# Patient Record
Sex: FEMALE | Race: WHITE | NOT HISPANIC OR LATINO | Employment: FULL TIME | URBAN - METROPOLITAN AREA
[De-identification: names, ages, dates, MRNs, and addresses within clinical notes are randomized per-mention and may not be internally consistent; named-entity substitution may affect disease eponyms.]

---

## 2017-02-08 ENCOUNTER — OFFICE VISIT (OUTPATIENT)
Dept: FAMILY MEDICINE | Facility: CLINIC | Age: 42
End: 2017-02-08

## 2017-02-08 VITALS
WEIGHT: 172 LBS | HEART RATE: 86 BPM | DIASTOLIC BLOOD PRESSURE: 84 MMHG | SYSTOLIC BLOOD PRESSURE: 122 MMHG | BODY MASS INDEX: 27 KG/M2 | HEIGHT: 67 IN | OXYGEN SATURATION: 97 % | TEMPERATURE: 97.8 F | RESPIRATION RATE: 19 BRPM

## 2017-02-08 DIAGNOSIS — J06.9 VIRAL UPPER RESPIRATORY TRACT INFECTION: ICD-10-CM

## 2017-02-08 DIAGNOSIS — J20.9 ACUTE BRONCHITIS, UNSPECIFIED ORGANISM: Primary | ICD-10-CM

## 2017-02-08 PROBLEM — Z76.89 HEALTH CARE HOME: Status: ACTIVE | Noted: 2017-02-08

## 2017-02-08 PROCEDURE — 99213 OFFICE O/P EST LOW 20 MIN: CPT | Performed by: FAMILY MEDICINE

## 2017-02-08 RX ORDER — BENZONATATE 100 MG/1
100 CAPSULE ORAL 3 TIMES DAILY PRN
Qty: 21 CAPSULE | Refills: 0 | Status: SHIPPED | OUTPATIENT
Start: 2017-02-08 | End: 2018-09-24

## 2017-02-08 RX ORDER — GUAIFENESIN 600 MG/1
1200 TABLET, EXTENDED RELEASE ORAL 2 TIMES DAILY PRN
Qty: 40 TABLET | Refills: 0 | Status: SHIPPED | OUTPATIENT
Start: 2017-02-08 | End: 2018-09-24

## 2017-02-08 NOTE — PROGRESS NOTES
SUBJECTIVE: 41 year old female complaining of headache with nasal congestion and cough for 4 day(s).   The patient describes chills and a body ache with low grade fever, sinus pain and fatigue.   The patient denies a history of SOB, GI symptoms or rash.   Smoking history: No.   Relevant past medical history: positive for sinus infections in the past.    OBJECTIVE: The patient appears healthy, alert, no distress, cooperative, smiling and fatigued.   EARS: negative  NOSE/SINUS: positive findings: mucosa erythematous and swollen, clear rhinorrhea   THROAT: mild erythema, no tonsillar hypertrophy, no exudates present and post nasal drainage   NECK:Neck supple. No adenopathy. Thyroid symmetric, normal size,, Carotids without bruits.   CHEST: Clear with deep cough. No rales, rhonchi or wheezing  SKIN: Skin color, temperature, and turgor are normal. No rashes or suspicious skin lesions noted.    ASSESSMENT: (J20.9) Acute bronchitis, unspecified organism  (primary encounter diagnosis)  Comment: push fluids  Plan: guaiFENesin (MUCINEX) 600 MG 12 hr tablet,         benzonatate (TESSALON) 100 MG capsule        Symptomatic care with decongestants, fluids, tylenol/advil prn. Use GUAIFENESIN  MG OR TBCR, 1 tab po BID (Twice per day), D: 20, R: 0 for congestion and cough.    In addition, I have suggested that the patient   monitor for symptoms of bacterial infection expecting slow gradual resolution of viral URI as the natural course.      (J06.9,  B97.89) Viral upper respiratory tract infection  Plan: guaiFENesin (MUCINEX) 600 MG 12 hr tablet        As above.

## 2017-02-08 NOTE — PATIENT INSTRUCTIONS
Acute bronchitis, unspecified organism  (primary encounter diagnosis)  Comment: push fluids  Plan: guaiFENesin (MUCINEX) 600 MG 12 hr tablet,         benzonatate (TESSALON) 100 MG capsule        Symptomatic care with decongestants, fluids, tylenol/advil prn. Use cough drops / OTC congestion and cough medications.    In addition, I have suggested that the patient   monitor for symptoms of bacterial infection expecting slow gradual resolution of viral URI as the natural course.    Rest 1-2 more days at home

## 2017-02-08 NOTE — NURSING NOTE
Ree KAREN Medardo is here today for Sinus Symptoms: Nasal Congestion, Bloody Mucous Draining and in Cough, Sinus Pressure/Pain, Headache, body aches, fever, hot flashes, cold sweats, cough, ear pain, jaw pain, and fatigue x5 days    Pre-Visit Screening :  Immunizations : up to date (Tdap performed sometime during her pregnancy, do not have records at this time)  Colonoscopy : NA  Mammogram : is up to date  Asthma Action Test/Plan : NA  PHQ9/GAD7 :  NA  BP done on the right arm, with a large sized cuff.  Pulse - regular  My Chart - accepts    CLASSIFICATION OF OVERWEIGHT AND OBESITY BY BMI                         Obesity Class           BMI(kg/m2)  Underweight                                    < 18.5  Normal                                         18.5-24.9  Overweight                                     25.0-29.9  OBESITY                     I                  30.0-34.9                              II                 35.0-39.9  EXTREME OBESITY             III                >40                             Patient's  BMI Body mass index is 26.93 kg/(m^2).  http://hin.nhlbi.nih.gov/menuplanner/menu.cgi  Questioned patient about current smoking habits.  Pt. has never smoked.  Katie Hoff, CMA

## 2017-02-11 ENCOUNTER — OFFICE VISIT (OUTPATIENT)
Dept: FAMILY MEDICINE | Facility: CLINIC | Age: 42
End: 2017-02-11

## 2017-02-11 VITALS
SYSTOLIC BLOOD PRESSURE: 118 MMHG | HEART RATE: 72 BPM | DIASTOLIC BLOOD PRESSURE: 70 MMHG | OXYGEN SATURATION: 99 % | HEIGHT: 67 IN | BODY MASS INDEX: 26.68 KG/M2 | WEIGHT: 170 LBS | RESPIRATION RATE: 18 BRPM | TEMPERATURE: 97.5 F

## 2017-02-11 DIAGNOSIS — R05.9 COUGH: Primary | ICD-10-CM

## 2017-02-11 PROCEDURE — 99213 OFFICE O/P EST LOW 20 MIN: CPT | Performed by: FAMILY MEDICINE

## 2017-02-11 RX ORDER — AZITHROMYCIN 250 MG/1
TABLET, FILM COATED ORAL
Qty: 6 TABLET | Refills: 0 | Status: SHIPPED | OUTPATIENT
Start: 2017-02-11 | End: 2018-09-24

## 2017-02-11 NOTE — NURSING NOTE
Ree Batres is here today for a follow up on her Bronchitis. She is now having some Vertigo, Visual Disturbances, Diarrhea, and Red/Brown Mucous coming up.    Pre-Visit Screening :  Immunizations : up to date  Colonoscopy : NA  Mammogram : is up to date  Asthma Action Test/Plan : NA  PHQ9/GAD7 :  NA  BP done on the right arm, with a large sized cuff.  Pulse - regular  My Chart - accepts    CLASSIFICATION OF OVERWEIGHT AND OBESITY BY BMI                         Obesity Class           BMI(kg/m2)  Underweight                                    < 18.5  Normal                                         18.5-24.9  Overweight                                     25.0-29.9  OBESITY                     I                  30.0-34.9                              II                 35.0-39.9  EXTREME OBESITY             III                >40                             Patient's  BMI Body mass index is 26.62 kg/(m^2).  http://hin.nhlbi.nih.gov/menuplanner/menu.cgi  Questioned patient about current smoking habits.  Pt. has never smoked.  Katie Hoff, CMA

## 2017-02-11 NOTE — PROGRESS NOTES
"SUBJECTIVE:   Ree Batres is a 41 year old female who complains of nasal blockage, headache, facial pressure, productive cough, cough described as productive of green sputum, shortness of breath, myalgias, chills and fatigue for 7 days. Pt was not able to get out of bed Tue-Thurs due to aches. She denies a history of wheezing, vomiting and chest pain and denies a history of asthma. Patient does not smoke cigarettes.     OBJECTIVE:/70 mmHg  Pulse 72  Temp(Src) 97.5  F (36.4  C) (Oral)  Resp 18  Ht 1.702 m (5' 7\")  Wt 77.111 kg (170 lb)  BMI 26.62 kg/m2  SpO2 99%  LMP 02/08/2017 (Exact Date) She appears well, vital signs are as noted by the nurse. Ears normal.  Throat and pharynx normal.  Neck supple. No adenopathy in the neck. Nose is congested. Sinuses non tender. The chest is clear after cough, lots of rhonchi prior, without wheezes or rales.    ASSESSMENT:   Bronchitis-likely viral but getting more and more thick phlegm, also more chills-raises suspicious of bacterial infection developing-pt symptoms earlier this week suggest possible influenza so I favor covering for possible bacterial infection in setting of flu    PLAN:Zpack, I reviewed the risks, benefits, and possible side effects of the medication.  The patient had an opportunity to ask any questions regarding the treatment plan. The patient was encouraged to call my office if any problems.   Symptomatic therapy suggested: push fluids, rest and use acetaminophen, ibuprofen, cough suppressant of choice as needed. Call or return to clinic prn if these symptoms worsen or fail to improve as anticipated.   "

## 2017-07-11 ENCOUNTER — HOSPITAL ENCOUNTER (OUTPATIENT)
Dept: MAMMOGRAPHY | Facility: CLINIC | Age: 42
Discharge: HOME | End: 2017-07-11
Attending: OBSTETRICS & GYNECOLOGY | Admitting: OBSTETRICS & GYNECOLOGY
Payer: COMMERCIAL

## 2017-07-11 ENCOUNTER — HOSPITAL ENCOUNTER (OUTPATIENT)
Dept: ULTRASOUND IMAGING | Facility: CLINIC | Age: 42
End: 2017-07-11
Attending: OBSTETRICS & GYNECOLOGY
Payer: COMMERCIAL

## 2017-07-11 DIAGNOSIS — N63.0 BREAST MASS: ICD-10-CM

## 2017-07-11 PROCEDURE — 76642 ULTRASOUND BREAST LIMITED: CPT | Mod: LT

## 2017-07-11 PROCEDURE — G0279 TOMOSYNTHESIS, MAMMO: HCPCS

## 2017-07-13 ENCOUNTER — TELEPHONE (OUTPATIENT)
Dept: MAMMOGRAPHY | Facility: CLINIC | Age: 42
End: 2017-07-13

## 2017-07-13 ENCOUNTER — HOSPITAL ENCOUNTER (OUTPATIENT)
Dept: MRI IMAGING | Facility: CLINIC | Age: 42
Discharge: HOME | End: 2017-07-13
Attending: OBSTETRICS & GYNECOLOGY | Admitting: OBSTETRICS & GYNECOLOGY
Payer: COMMERCIAL

## 2017-07-13 DIAGNOSIS — N63.20 LEFT BREAST LUMP: ICD-10-CM

## 2017-07-13 PROCEDURE — 25000128 H RX IP 250 OP 636: Performed by: OBSTETRICS & GYNECOLOGY

## 2017-07-13 PROCEDURE — 77059 MR BREAST BILATERAL W/O & W CONTRAST: CPT

## 2017-07-13 PROCEDURE — A9585 GADOBUTROL INJECTION: HCPCS | Performed by: OBSTETRICS & GYNECOLOGY

## 2017-07-13 PROCEDURE — 27210995 ZZH RX 272: Performed by: OBSTETRICS & GYNECOLOGY

## 2017-07-13 RX ORDER — ACYCLOVIR 200 MG/1
30 CAPSULE ORAL ONCE
Status: COMPLETED | OUTPATIENT
Start: 2017-07-13 | End: 2017-07-13

## 2017-07-13 RX ORDER — GADOBUTROL 604.72 MG/ML
8 INJECTION INTRAVENOUS ONCE
Status: COMPLETED | OUTPATIENT
Start: 2017-07-13 | End: 2017-07-13

## 2017-07-13 RX ADMIN — SODIUM CHLORIDE 30 ML: 9 INJECTION, SOLUTION INTRAMUSCULAR; INTRAVENOUS; SUBCUTANEOUS at 10:48

## 2017-07-13 RX ADMIN — GADOBUTROL 8 ML: 604.72 INJECTION INTRAVENOUS at 10:47

## 2017-07-13 NOTE — TELEPHONE ENCOUNTER
"Ms. Batres was called and given her 7/13/2017 Breast MRI Results (Negative).   Breast Center Radiologist recommends \"Clinical Follow-up. Given the lack of imaging findings, further Management of breast lump should be based on clinical grounds.\"  "

## 2018-08-22 ENCOUNTER — HOSPITAL ENCOUNTER (OUTPATIENT)
Dept: MAMMOGRAPHY | Facility: CLINIC | Age: 43
Discharge: HOME | End: 2018-08-22
Attending: OBSTETRICS & GYNECOLOGY | Admitting: OBSTETRICS & GYNECOLOGY
Payer: COMMERCIAL

## 2018-08-22 DIAGNOSIS — Z12.31 VISIT FOR SCREENING MAMMOGRAM: ICD-10-CM

## 2018-08-22 PROCEDURE — 77063 BREAST TOMOSYNTHESIS BI: CPT

## 2018-09-24 ENCOUNTER — OFFICE VISIT (OUTPATIENT)
Dept: FAMILY MEDICINE | Facility: CLINIC | Age: 43
End: 2018-09-24

## 2018-09-24 VITALS
DIASTOLIC BLOOD PRESSURE: 70 MMHG | BODY MASS INDEX: 23.45 KG/M2 | HEIGHT: 67 IN | RESPIRATION RATE: 20 BRPM | WEIGHT: 149.4 LBS | HEART RATE: 80 BPM | TEMPERATURE: 99.4 F | SYSTOLIC BLOOD PRESSURE: 144 MMHG

## 2018-09-24 DIAGNOSIS — R52 BODY ACHES: ICD-10-CM

## 2018-09-24 DIAGNOSIS — R50.9 FEVER AND CHILLS: Primary | ICD-10-CM

## 2018-09-24 LAB
FLUAV AG UPPER RESP QL IA.RAPID: NORMAL
FLUBV AG UPPER RESP QL IA.RAPID: NORMAL

## 2018-09-24 PROCEDURE — 87804 INFLUENZA ASSAY W/OPTIC: CPT | Performed by: PHYSICIAN ASSISTANT

## 2018-09-24 PROCEDURE — 99213 OFFICE O/P EST LOW 20 MIN: CPT | Performed by: PHYSICIAN ASSISTANT

## 2018-09-24 NOTE — NURSING NOTE
Ree Batres is here for a fever, cough and not feeling well for the past day. Started yesterday after the [employer] game    Questioned patient about current smoking habits.  Pt. has never smoked.  PULSE regular  My Chart: active  CLASSIFICATION OF OVERWEIGHT AND OBESITY BY BMI                        Obesity Class           BMI(kg/m2)  Underweight                                    < 18.5  Normal                                         18.5-24.9  Overweight                                     25.0-29.9  OBESITY                     I                  30.0-34.9                             II                 35.0-39.9  EXTREME OBESITY             III                >40                            Patient's  BMI Body mass index is 23.4 kg/(m^2).  http://hin.nhlbi.nih.gov/menuplanner/menu.cgi  Pre-visit planning  Immunizations - up to date  Colonoscopy -   Mammogram -   Asthma -   PHQ9 -    BABAK-7 -

## 2018-09-24 NOTE — MR AVS SNAPSHOT
After Visit Summary   9/24/2018    Ree Batres    MRN: [id number]           Patient Information     Date Of Birth          1975        Visit Information        Provider Department      9/24/2018 5:30 PM Teena Meraz PA-C [facility]        Today's Diagnoses     Fever and chills    -  1    Body aches           Follow-ups after your visit        Follow-up notes from your care team     Return if symptoms worsen or fail to improve.      Future tests that were ordered for you today     Open Standing Orders        Priority Remaining Interval Expires Ordered    VENOUS COLLECTION Routine 1/1 9/24/2019 9/24/2018    CL AFF MONO SCREEN (BFP) Routine 1/1 9/24/2019 9/24/2018    HEMOGRAM/PLATELET (BFP) Routine 1/1 9/24/2019 9/24/2018            Who to contact     If you have questions or need follow up information about today's clinic visit or your schedule please contact [facility] directly at [phone].  Normal or non-critical lab and imaging results will be communicated to you by Qvolvehart, letter or phone within 4 business days after the clinic has received the results. If you do not hear from us within 7 days, please contact the clinic through Timecrost or phone. If you have a critical or abnormal lab result, we will notify you by phone as soon as possible.  Submit refill requests through [employer] or call your pharmacy and they will forward the refill request to us. Please allow 3 business days for your refill to be completed.          Additional Information About Your Visit        Qvolvehart Information     [employer] gives you secure access to your electronic health record. If you see a primary care provider, you can also send messages to your care team and make appointments. If you have questions, please call your primary care clinic.  If you do not have a primary care provider, please call [phone] and they will assist you.        Care  "EveryWhere ID     This is your Care EveryWhere ID. This could be used by other organizations to access your [place] medical records  [id number]        Your Vitals Were     Pulse Temperature Respirations Height Last Period Breastfeeding?    80 99.4  F (37.4  C) (Oral) 20 1.702 m (5' 7\") 09/24/2018 No    BMI (Body Mass Index)                   23.4 kg/m2            Blood Pressure from Last 3 Encounters:   09/24/18 144/70   02/11/17 118/70   02/08/17 122/84    Weight from Last 3 Encounters:   09/24/18 67.8 kg (149 lb 6.4 oz)   02/11/17 77.1 kg (170 lb)   02/08/17 78 kg (172 lb)              We Performed the Following     Influenza A and B (BFP)        Primary Care Provider Office Phone # Fax #    Katharina Lincoln [phone] [address] [address]        Equal Access to Services     : Hadii aad ku hadasho Soomaali, waaxda luqadaha, qaybta kaalmada adeegyada, waxay nattyin hayjuvencio ventura . So [facility] [phone].    ATENCIÓN: Si habla español, tiene a espinal disposición servicios gratuitos de asistencia lingüística. [facility] [phone].    We comply with applicable federal civil rights laws and [place] laws. We do not discriminate on the basis of race, color, national origin, age, disability, sex, sexual orientation, or gender identity.            Thank you!     Thank you for choosing [facility]  for your care. Our goal is always to provide you with excellent care. Hearing back from our patients is one way we can continue to improve our services. Please take a few minutes to complete the written survey that you may receive in the mail after your visit with us. Thank you!             Your Updated Medication List - Protect others around you: Learn how to safely use, store and throw away your medicines at www.disposemymeds.org.      Notice  As of 9/24/2018 11:12 PM    You have not been prescribed any medications.      "

## 2018-09-24 NOTE — PROGRESS NOTES
"CC: Fever, body aches    History:  Ree was in [employer] race in [place], [place] this past Friday and Saturday, 2 and 3 days ago. Was feeling run down Saturday night when she got home for the run. Went to [employer] game yesterday and came home with body aches, tingling sensation in skin, throat feeling sore, but not painful. Some jaw pain. and took temp and it was 102.4. Since then she has been waking up every 4 hours to take ibuprofen.     Did take ibuprofen 1-2 hours ago.    Son was treated for pneumonia earlier this month.     PMH, MEDICATIONS, ALLERGIES, SOCIAL AND FAMILY HISTORY in [facility] and reviewed by me personally.      ROS negative other than the symptoms noted above in the HPI.        Examination   /70 (BP Location: Left arm, Patient Position: Chair, Cuff Size: Adult Regular)  Pulse 80  Temp 99.4  F (37.4  C) (Oral)  Resp 20  Ht 1.702 m (5' 7\")  Wt 67.8 kg (149 lb 6.4 oz)  LMP 09/24/2018  Breastfeeding? No  BMI 23.4 kg/m2       Constitutional: Sitting comfortably, in no acute distress. Vital signs noted  Eyes: pupils equal round reactive to light and accomodation, extra ocular movements intact  Ears: external canals and TMs free of abnormalities  Nose: patent, without mucosal abnormalities  Mouth and throat: without erythema or lesions of the mucosa  Neck:  no adenopathy, trachea midline and normal to palpation  Cardiovascular:  regular rate and rhythm, no murmurs, clicks, or gallops  Respiratory:  normal respiratory rate and rhythm, lungs clear to auscultation  Psychiatric: mentation appears normal and affect normal/bright        A/P    ICD-10-CM    1. Fever and chills R50.9 Influenza A and B (BFP)     VENOUS COLLECTION     CL AFF MONO SCREEN (BFP)     HEMOGRAM/PLATELET (BFP)   2. Body aches R52 VENOUS COLLECTION     CL AFF MONO SCREEN (BFP)     HEMOGRAM/PLATELET (BFP)       DISCUSSION: Influenza swab today negative. Recommended CBC, and monospot (although warned that monospot test is more " accurate at 2 weeks). Patient would like to wait and check those later this week if not improving. I placed standing orders for this. Recommended fluids, calorie/electrolyte replacement. Alternate ibuprofen with food with Tylenol every 2-3 hours. Contact me tomorrow if not improving, or proceed to ER with worsening as may need fluid replacement.     follow up visit: As needed    Teena Meraz PA-C  [facility]

## 2018-09-26 ENCOUNTER — APPOINTMENT (OUTPATIENT)
Dept: GENERAL RADIOLOGY | Facility: CLINIC | Age: 43
End: 2018-09-26
Attending: EMERGENCY MEDICINE
Payer: COMMERCIAL

## 2018-09-26 ENCOUNTER — HOSPITAL ENCOUNTER (EMERGENCY)
Facility: CLINIC | Age: 43
Discharge: HOME | End: 2018-09-26
Attending: EMERGENCY MEDICINE | Admitting: EMERGENCY MEDICINE
Payer: COMMERCIAL

## 2018-09-26 VITALS
TEMPERATURE: 101 F | SYSTOLIC BLOOD PRESSURE: 151 MMHG | RESPIRATION RATE: 16 BRPM | DIASTOLIC BLOOD PRESSURE: 90 MMHG | OXYGEN SATURATION: 97 %

## 2018-09-26 DIAGNOSIS — J18.9 PNEUMONIA OF LEFT UPPER LOBE DUE TO INFECTIOUS ORGANISM: ICD-10-CM

## 2018-09-26 LAB
ALBUMIN SERPL-MCNC: 3.6 G/DL (ref 3.4–5)
ALBUMIN UR-MCNC: NEGATIVE MG/DL
ALP SERPL-CCNC: 66 U/L (ref 40–150)
ALT SERPL W P-5'-P-CCNC: 24 U/L (ref 0–50)
ANION GAP SERPL CALCULATED.3IONS-SCNC: 6 MMOL/L (ref 3–14)
APPEARANCE UR: CLEAR
AST SERPL W P-5'-P-CCNC: 23 U/L (ref 0–45)
BASOPHILS # BLD AUTO: 0 10E9/L (ref 0–0.2)
BASOPHILS NFR BLD AUTO: 0.3 %
BILIRUB SERPL-MCNC: 0.4 MG/DL (ref 0.2–1.3)
BILIRUB UR QL STRIP: NEGATIVE
BUN SERPL-MCNC: 9 MG/DL (ref 7–30)
CALCIUM SERPL-MCNC: 8.5 MG/DL (ref 8.5–10.1)
CHLORIDE SERPL-SCNC: 105 MMOL/L (ref 94–109)
CO2 SERPL-SCNC: 28 MMOL/L (ref 20–32)
COLOR UR AUTO: COLORLESS
CREAT SERPL-MCNC: 0.56 MG/DL (ref 0.52–1.04)
DEPRECATED S PYO AG THROAT QL EIA: ABNORMAL
DIFFERENTIAL METHOD BLD: NORMAL
EOSINOPHIL # BLD AUTO: 0 10E9/L (ref 0–0.7)
EOSINOPHIL NFR BLD AUTO: 0.5 %
ERYTHROCYTE [DISTWIDTH] IN BLOOD BY AUTOMATED COUNT: 13.1 % (ref 10–15)
GFR SERPL CREATININE-BSD FRML MDRD: >90 ML/MIN/1.7M2
GLUCOSE SERPL-MCNC: 96 MG/DL (ref 70–99)
GLUCOSE UR STRIP-MCNC: NEGATIVE MG/DL
HCG UR QL: NEGATIVE
HCT VFR BLD AUTO: 38.8 % (ref 35–47)
HETEROPH AB SER QL: NEGATIVE
HGB BLD-MCNC: 12.8 G/DL (ref 11.7–15.7)
HGB UR QL STRIP: NEGATIVE
IMM GRANULOCYTES # BLD: 0 10E9/L (ref 0–0.4)
IMM GRANULOCYTES NFR BLD: 0.3 %
KETONES UR STRIP-MCNC: NEGATIVE MG/DL
LEUKOCYTE ESTERASE UR QL STRIP: NEGATIVE
LYMPHOCYTES # BLD AUTO: 1.1 10E9/L (ref 0.8–5.3)
LYMPHOCYTES NFR BLD AUTO: 16.6 %
MCH RBC QN AUTO: 30.4 PG (ref 26.5–33)
MCHC RBC AUTO-ENTMCNC: 33 G/DL (ref 31.5–36.5)
MCV RBC AUTO: 92 FL (ref 78–100)
MONOCYTES # BLD AUTO: 1 10E9/L (ref 0–1.3)
MONOCYTES NFR BLD AUTO: 14.6 %
NEUTROPHILS # BLD AUTO: 4.4 10E9/L (ref 1.6–8.3)
NEUTROPHILS NFR BLD AUTO: 67.7 %
NITRATE UR QL: NEGATIVE
NRBC # BLD AUTO: 0 10*3/UL
NRBC BLD AUTO-RTO: 0 /100
PH UR STRIP: 7 PH (ref 5–7)
PLATELET # BLD AUTO: 200 10E9/L (ref 150–450)
POTASSIUM SERPL-SCNC: 4 MMOL/L (ref 3.4–5.3)
PROT SERPL-MCNC: 7.4 G/DL (ref 6.8–8.8)
RBC # BLD AUTO: 4.21 10E12/L (ref 3.8–5.2)
RBC #/AREA URNS AUTO: <1 /HPF (ref 0–2)
SODIUM SERPL-SCNC: 139 MMOL/L (ref 133–144)
SOURCE: NORMAL
SP GR UR STRIP: 1 (ref 1–1.03)
SPECIMEN SOURCE: ABNORMAL
SQUAMOUS #/AREA URNS AUTO: <1 /HPF (ref 0–1)
UROBILINOGEN UR STRIP-MCNC: 0 MG/DL (ref 0–2)
WBC # BLD AUTO: 6.5 10E9/L (ref 4–11)
WBC #/AREA URNS AUTO: <1 /HPF (ref 0–5)

## 2018-09-26 PROCEDURE — 25000132 ZZH RX MED GY IP 250 OP 250 PS 637

## 2018-09-26 PROCEDURE — 87880 STREP A ASSAY W/OPTIC: CPT | Performed by: EMERGENCY MEDICINE

## 2018-09-26 PROCEDURE — 96365 THER/PROPH/DIAG IV INF INIT: CPT

## 2018-09-26 PROCEDURE — 81025 URINE PREGNANCY TEST: CPT | Performed by: EMERGENCY MEDICINE

## 2018-09-26 PROCEDURE — 99284 EMERGENCY DEPT VISIT MOD MDM: CPT | Mod: 25

## 2018-09-26 PROCEDURE — 86308 HETEROPHILE ANTIBODY SCREEN: CPT | Performed by: EMERGENCY MEDICINE

## 2018-09-26 PROCEDURE — 36415 COLL VENOUS BLD VENIPUNCTURE: CPT | Performed by: EMERGENCY MEDICINE

## 2018-09-26 PROCEDURE — 25000128 H RX IP 250 OP 636: Performed by: EMERGENCY MEDICINE

## 2018-09-26 PROCEDURE — 85025 COMPLETE CBC W/AUTO DIFF WBC: CPT | Performed by: EMERGENCY MEDICINE

## 2018-09-26 PROCEDURE — 71046 X-RAY EXAM CHEST 2 VIEWS: CPT

## 2018-09-26 PROCEDURE — 80053 COMPREHEN METABOLIC PANEL: CPT | Performed by: EMERGENCY MEDICINE

## 2018-09-26 PROCEDURE — 96367 TX/PROPH/DG ADDL SEQ IV INF: CPT

## 2018-09-26 PROCEDURE — 96361 HYDRATE IV INFUSION ADD-ON: CPT

## 2018-09-26 PROCEDURE — 87040 BLOOD CULTURE FOR BACTERIA: CPT | Mod: 91 | Performed by: EMERGENCY MEDICINE

## 2018-09-26 PROCEDURE — 81001 URINALYSIS AUTO W/SCOPE: CPT | Performed by: EMERGENCY MEDICINE

## 2018-09-26 PROCEDURE — 96375 TX/PRO/DX INJ NEW DRUG ADDON: CPT

## 2018-09-26 RX ORDER — SODIUM CHLORIDE 9 MG/ML
1000 INJECTION, SOLUTION INTRAVENOUS CONTINUOUS
Status: DISCONTINUED | OUTPATIENT
Start: 2018-09-26 | End: 2018-09-26 | Stop reason: HOSPADM

## 2018-09-26 RX ORDER — CEFDINIR 300 MG/1
300 CAPSULE ORAL 2 TIMES DAILY
Qty: 20 CAPSULE | Refills: 0 | Status: SHIPPED | OUTPATIENT
Start: 2018-09-26 | End: 2019-12-13

## 2018-09-26 RX ORDER — AZITHROMYCIN 250 MG/1
TABLET, FILM COATED ORAL
Qty: 4 TABLET | Refills: 0 | Status: SHIPPED | OUTPATIENT
Start: 2018-09-26 | End: 2018-09-30

## 2018-09-26 RX ORDER — ACETAMINOPHEN 325 MG/1
TABLET ORAL
Status: COMPLETED
Start: 2018-09-26 | End: 2018-09-26

## 2018-09-26 RX ORDER — METOCLOPRAMIDE HYDROCHLORIDE 5 MG/ML
10 INJECTION INTRAMUSCULAR; INTRAVENOUS ONCE
Status: COMPLETED | OUTPATIENT
Start: 2018-09-26 | End: 2018-09-26

## 2018-09-26 RX ORDER — BENZONATATE 100 MG/1
100 CAPSULE ORAL 3 TIMES DAILY PRN
Qty: 15 CAPSULE | Refills: 0 | Status: SHIPPED | OUTPATIENT
Start: 2018-09-26 | End: 2019-12-13

## 2018-09-26 RX ORDER — DIPHENHYDRAMINE HYDROCHLORIDE 50 MG/ML
12.5 INJECTION INTRAMUSCULAR; INTRAVENOUS ONCE
Status: COMPLETED | OUTPATIENT
Start: 2018-09-26 | End: 2018-09-26

## 2018-09-26 RX ORDER — CEFTRIAXONE 1 G/1
1 INJECTION, POWDER, FOR SOLUTION INTRAMUSCULAR; INTRAVENOUS ONCE
Status: COMPLETED | OUTPATIENT
Start: 2018-09-26 | End: 2018-09-26

## 2018-09-26 RX ADMIN — ACETAMINOPHEN: 325 TABLET ORAL at 14:47

## 2018-09-26 RX ADMIN — METOCLOPRAMIDE 10 MG: 5 INJECTION, SOLUTION INTRAMUSCULAR; INTRAVENOUS at 10:59

## 2018-09-26 RX ADMIN — SODIUM CHLORIDE 1000 ML: 9 INJECTION, SOLUTION INTRAVENOUS at 10:59

## 2018-09-26 RX ADMIN — CEFTRIAXONE SODIUM 1 G: 1 INJECTION, POWDER, FOR SOLUTION INTRAMUSCULAR; INTRAVENOUS at 12:52

## 2018-09-26 RX ADMIN — AZITHROMYCIN MONOHYDRATE 500 MG: 500 INJECTION, POWDER, LYOPHILIZED, FOR SOLUTION INTRAVENOUS at 13:17

## 2018-09-26 RX ADMIN — DIPHENHYDRAMINE HYDROCHLORIDE 12.5 MG: 50 INJECTION, SOLUTION INTRAMUSCULAR; INTRAVENOUS at 11:00

## 2018-09-26 ASSESSMENT — ENCOUNTER SYMPTOMS
APPETITE CHANGE: 1
ABDOMINAL PAIN: 0
FEVER: 1
COUGH: 1
HEADACHES: 1
DIARRHEA: 0

## 2018-09-26 NOTE — ED PROVIDER NOTES
History     Chief Complaint:    Cough and Fever    HPI   Ree Batres is a 43 year old female who presents with a 5 day history of cough and a fever. The patient reports that she was involved in a [employer] race in the cold and rain of [place] and then returned home to watch the [employer] game this past Sunday. She reports that she felt feverish and she felt a crawling sensation on her skin, which is worsened by the fever. She reports that she went home and measured a temperature of 103. The next morning, Monday, she got up and took her temperature at 104.8. She reports that she could not go to work due to the discomfort and has been mostly laying in bed since Monday. She has been taking ibuprofen every 4 hours which has maintained her fever between 100-102. She reports that she has also had a worsened appetite, some vision changes, cough, a scratchy throat, voice change, an intense headache, and is very sweaty. The patient notes that her sister is a doctor and she had mentioned to her sister that they were in the woods recently and her sister advised that she relay this information to us. The patient denies any new rashes.     Of note: The patient went to her doctor's office where she had a negative swab but no chest X ray was done. She denies any other medical concerns.Last ibuprofen about 0800, last tylenol about 0600 this morning. She denies diarrhea, abdominal pain,    Allergies:  Amoxicillin  Codeine sulfate  Penicillins      Medications:    No current outpatient prescriptions on file.     Past Medical History:    Basal cell carcinoma of skin   H pylori ulcer   Renal disease     Past Surgical History:    Appendectomy  Cholecystectomy  Colposcopy, conization, combined  Knee surgery    Family History:    Cancer  Alzheimer's disease  Thyroid disease    Social History:  The patient  reports that she has never smoked. She has never used smokeless tobacco. She reports that she drinks about 0.5 oz of alcohol per  week  She reports that she does not use illicit drugs.   Marital Status:   [2]     Review of Systems   Constitutional: Positive for appetite change and fever.   Eyes: Positive for visual disturbance.   Respiratory: Positive for cough.    Gastrointestinal: Negative for abdominal pain and diarrhea.   Neurological: Positive for headaches.   All other systems reviewed and are negative.    Physical Exam   First Vitals:  BP: (!) 160/115  Heart Rate: 81  Temp: 98.9  F (37.2  C)  Resp: 16  SpO2: 97 %      Physical Exam  Constitutional:  Appears well-developed and well-nourished. Alert. Conversant.  Feels hot and skin is sweaty.  Mildly hoarse voice but no respiratory distress.  HENT:   Head: Atraumatic.   Right ear: Pinna, canal are normal.  Nonpurulent fluid behind the right tympanic membrane but no bulging or erythema.  Left ear: Pinna, canal, tympanic membrane are normal.  Nose: Nose normal.  Mouth/Throat: Oral mucosa is clear and moist. no trismus.  Erythema of both peritonsillar pillars but tonsils and uvula look normal. Tonsils symmetric. No tonsillar enlargement, erythema, or exudate.  Eyes: Conjunctivae normal. EOM normal. Pupils equal, round, and reactive to light. No scleral icterus.   Neck: Normal range of motion. Neck supple. No tracheal deviation present.   Cardiovascular: Normal rate, regular rhythm. No gallop. No friction rub. No murmur heard. Symmetric radial artery pulses   Pulmonary/Chest: Effort normal. No stridor. No respiratory distress. No wheezes. No rales. No rhonchi .  Diminished breath sounds in the left base but no definite rales or rhonchi.  No tenderness.   Abdominal: Soft. Bowel sounds normal. No distension. No mass or hepatosplenomegaly. No tenderness. No rebound. No guarding.   Musculoskeletal:   RUE: Normal range of motion. No tenderness. No deformity  LUE: Normal range of motion. No tenderness. No deformity  RLE: Normal range of motion. No edema. No tenderness. No deformity  LLE:  Normal range of motion. No edema. No tenderness. No deformity  Lymph: No cervical adenopathy.   Neurological: Alert and oriented to person, place, and time. Normal strength. CN II-VII intact. No sensory deficit. GCS eye subscore is 4. GCS verbal subscore is 5. GCS motor subscore is 6. Normal coordination   Skin: Skin is warm to the touch and sweaty.  No rash noted. No pallor. Normal capillary refill.  Psychiatric:  Normal mood. Normal affect.     Emergency Department Course   Imaging:  XR Chest 2 Views   Final Result   IMPRESSION: Patchy opacity in the left upper lobe corresponding to   pneumonia. Recommend continued radiographic surveillance until   resolution. No pleural effusion or pneumothorax.      JADIEL HARP MD         I communicated the results of the imaging studies with the patient who expressed understanding of these findings.      Laboratory:  UA: WNL  HCG qualitative Negative  Blood Culture: pending x 2   CBC: WNL   Mono: Negative  Rapid strep Positive    Interventions:  1059: NS 1L IV Bolus   1059: Reglan 10mg IV  1100: Benadryl 12.5mg IV  1252: Rocephin 1g IV  1431: Zithromax 500mg   1447: Tylenol 325g     Emergency Department Course:  Past medical records, nursing notes, and vitals reviewed.  1029: I performed an exam of the patient and obtained history, as documented above.       IV inserted and blood drawn for the above work up to be conducted and for the above interventions to be administered.     The patient was sent for a CXR while in the emergency department, findings above.     1427: Rechecked the patient, findings and plan explained to the patient. Patient discharged home, status improved, with instructions regarding supportive care, medications, and reasons to return as well as the importance of close follow-up was reviewed.    Impression & Plan    Medical Decision Making:  Ree Batres is a 43 year old female presents for evaluation of fever, ongoing for the past few days.  It is  associated with cough, sweating, fatigue, body aches, headache.  It all started after she did the [employer] race in [place].  Differential was broad.  No classic rash to suggest viral syndrome. No evidence for OM on exam.  She did have mild pharyngeal erythema but no tonsillar exudates.  We did do a strep screen which is positive.  Unclear if this is truly causing her symptoms or if it is a false positive or perhaps detection of a carrier state.  Chest x-ray confirms a left upper lobe pneumonia. with her symptoms I suspect this is the true cause of her fever.  Laboratory workup is reassuring.  Oxygen is normal.  Vital signs are stable.  No cardiopulmonary comorbidities so I think she is safe for outpatient management.     Differential for fever included cellulitis, septic arthritis, osteomyelitis but these are not seen on exam. Abdominal exam is benign, appendicitis/colitis/ intra-abdominal source for fever is unlikely. The patient is smiling, alert, sitting up, and non-toxic, so I do not think sepsis or meningitis is present. UA is normal.    At this point the child is non-toxic, well appearing. This fever is likely due to viral illness. Plan of care includes supportive care with antipyretics, fluids, and watchful waiting at home. Instructions to return for recheck in 5-7 days if not improved, or immediately if worsening fever, decreasing oral intake, lethargy, irritability, seizure, or any other concerns.    Critical Care time:  none    Diagnosis:    ICD-10-CM    1. Pneumonia of left upper lobe due to infectious organism (H) J18.1        Disposition:  discharged to home    Discharge Medications:  Discharge Medication List as of 9/26/2018  2:32 PM      START taking these medications    Details   azithromycin (ZITHROMAX Z-KARISHMA) 250 MG tablet one tablet daily for the next 4 days (first dose given in ER), Disp-4 tablet, R-0, Local Print      benzonatate (TESSALON) 100 MG capsule Take 1 capsule (100 mg) by mouth 3 times  daily as needed for cough, Disp-15 capsule, R-0, Local Print      cefdinir (OMNICEF) 300 MG capsule Take 1 capsule (300 mg) by mouth 2 times daily, Disp-20 capsule, R-0, Local Print           Alejandra YANEZ am serving as a scribe at 10:29 AM on 9/26/2018 to document services personally performed by David Marrero MD* based on my observations and the provider's statements to me.    [facility] EMERGENCY DEPARTMENT       David Marrero MD  09/26/18 [id number]

## 2018-09-26 NOTE — DISCHARGE INSTRUCTIONS
Pneumonia (Adult)    Your chest x-ray shows that you have a pneumonia in your left upper lung.  Please take your antibiotics to help treat the infection.  Return to the ER right away if you have any worsening trouble breathing, weakness or lightheadedness or fainting spells, worsening chest pain, or if you have any concerns.  Recheck with your doctor within 1-2 weeks to get a follow-up x-ray to make sure that all of the fluid from your pneumonia is gone from your lung.  Pneumonia is an infection deep within the lungs. It is in the small air sacs (alveoli). Pneumonia may be caused by a virus or bacteria. Pneumonia caused by bacteria is usually treated with an antibiotic. Severe cases may need to be treated in the hospital. Milder cases can be treated at home. Symptoms usually start to get better during the first 2 days of treatment.    Home care  Follow these guidelines when caring for yourself at home:    Rest at home for the first 2 to 3 days, or until you feel stronger. Don t let yourself get overly tired when you go back to your activities.    Stay away from cigarette smoke - yours or other people s.    You may use acetaminophen or ibuprofen to control fever or pain, unless another medicine was prescribed. If you have chronic liver or kidney disease, talk with your healthcare provider before using these medicines. Also talk with your provider if you ve had a stomach ulcer or gastrointestinal bleeding. Don t give aspirin to anyone younger than 18 years of age who is ill with a fever. It may cause severe liver damage.    Your appetite may be poor, so a light diet is fine.    Drink 6 to 8 glasses of fluids every day to make sure you are getting enough fluids. Beverages can include water, sport drinks, sodas without caffeine, juices, tea, or soup. Fluids will help loosen secretions in the lung. This will make it easier for you to cough up the phlegm (sputum). If you also have heart or kidney disease, check with  your healthcare provider before you drink extra fluids.    Take antibiotic medicine prescribed until it is all gone, even if you are feeling better after a few days.  Follow-up care  Follow up with your healthcare provider in the next 2 to 3 days, or as advised. This is to be sure the medicine is helping you get better.  If you are 65 or older, you should get a pneumococcal vaccine and a yearly flu (influenza) shot. You should also get these vaccines if you have chronic lung disease like asthma, emphysema, or COPD. Recently, a second type of pneumonia vaccine has become available for everyone over 65 years old. This is in addition to the previous vaccine. Ask your provider about this.  When to seek medical advice  Call your healthcare provider right away if any of these occur:    You don t get better within the first 48 hours of treatment    Shortness of breath gets worse    Rapid breathing (more than 25 breaths per minute)    Coughing up blood    Chest pain gets worse with breathing    Fever of 100.4 F (38 C) or higher that doesn t get better with fever medicine    Weakness, dizziness, or fainting that gets worse    Thirst or dry mouth that gets worse    Sinus pain, headache, or a stiff neck    Chest pain not caused by coughing  Date Last Reviewed: 1/1/2017 2000-2017 [employer]. [address] [place] [address], [place] [address]. All rights reserved. This information is not intended as a substitute for professional medical care. Always follow your healthcare professional's instructions.

## 2018-09-26 NOTE — ED TRIAGE NOTES
Pt arrives with cough and fever since Friday. Was seen at primary on Monday, had negative flu swab, no chest xray. Cough and fever have persisted, fever up to 104 at home temporally. ABCs intact.     Last tylenol at 10 am.

## 2018-09-26 NOTE — ED AVS SNAPSHOT
[facility]    [address]    [facility] [address]    Phone:  [phone]    Fax:  [phone]                                       Ree Batres   MRN: [id number]    Department:  [facility]   Date of Visit:  9/26/2018           Patient Information     Date Of Birth          1975        Your diagnoses for this visit were:     Pneumonia of left upper lobe due to infectious organism (H)        You were seen by David Marrero MD.      Follow-up Information     Follow up with Katharina Linocln MD In 7 days.    Specialty:  Family Practice    Contact information:    [address]  [facility] [address] [phone]          Discharge Instructions         Pneumonia (Adult)    Your chest x-ray shows that you have a pneumonia in your left upper lung.  Please take your antibiotics to help treat the infection.  Return to the ER right away if you have any worsening trouble breathing, weakness or lightheadedness or fainting spells, worsening chest pain, or if you have any concerns.  Recheck with your doctor within 1-2 weeks to get a follow-up x-ray to make sure that all of the fluid from your pneumonia is gone from your lung.  Pneumonia is an infection deep within the lungs. It is in the small air sacs (alveoli). Pneumonia may be caused by a virus or bacteria. Pneumonia caused by bacteria is usually treated with an antibiotic. Severe cases may need to be treated in the hospital. Milder cases can be treated at home. Symptoms usually start to get better during the first 2 days of treatment.    Home care  Follow these guidelines when caring for yourself at home:    Rest at home for the first 2 to 3 days, or until you feel stronger. Don t let yourself get overly tired when you go back to your activities.    Stay away from cigarette smoke - yours or other people s.    You may use acetaminophen or ibuprofen to control  fever or pain, unless another medicine was prescribed. If you have chronic liver or kidney disease, talk with your healthcare provider before using these medicines. Also talk with your provider if you ve had a stomach ulcer or gastrointestinal bleeding. Don t give aspirin to anyone younger than 18 years of age who is ill with a fever. It may cause severe liver damage.    Your appetite may be poor, so a light diet is fine.    Drink 6 to 8 glasses of fluids every day to make sure you are getting enough fluids. Beverages can include water, sport drinks, sodas without caffeine, juices, tea, or soup. Fluids will help loosen secretions in the lung. This will make it easier for you to cough up the phlegm (sputum). If you also have heart or kidney disease, check with your healthcare provider before you drink extra fluids.    Take antibiotic medicine prescribed until it is all gone, even if you are feeling better after a few days.  Follow-up care  Follow up with your healthcare provider in the next 2 to 3 days, or as advised. This is to be sure the medicine is helping you get better.  If you are 65 or older, you should get a pneumococcal vaccine and a yearly flu (influenza) shot. You should also get these vaccines if you have chronic lung disease like asthma, emphysema, or COPD. Recently, a second type of pneumonia vaccine has become available for everyone over 65 years old. This is in addition to the previous vaccine. Ask your provider about this.  When to seek medical advice  Call your healthcare provider right away if any of these occur:    You don t get better within the first 48 hours of treatment    Shortness of breath gets worse    Rapid breathing (more than 25 breaths per minute)    Coughing up blood    Chest pain gets worse with breathing    Fever of 100.4 F (38 C) or higher that doesn t get better with fever medicine    Weakness, dizziness, or fainting that gets worse    Thirst or dry mouth that gets worse    Sinus  pain, headache, or a stiff neck    Chest pain not caused by coughing  Date Last Reviewed: 1/1/2017 2000-2017 [employer]. [address] [place] [address], [place] [address]. All rights reserved. This information is not intended as a substitute for professional medical care. Always follow your healthcare professional's instructions.          24 Hour Appointment Hotline       To make an appointment at any [facility], call [phone] ([phone]). If you don't have a family doctor or clinic, we will help you find one. [facility] are conveniently located to serve the needs of you and your family.             Review of your medicines      START taking        Dose / Directions Last dose taken    azithromycin 250 MG tablet   Commonly known as:  ZITHROMAX Z-KARISHMA   Quantity:  4 tablet        one tablet daily for the next 4 days (first dose given in ER)   Refills:  0        benzonatate 100 MG capsule   Commonly known as:  TESSALON   Dose:  100 mg   Quantity:  15 capsule        Take 1 capsule (100 mg) by mouth 3 times daily as needed for cough   Refills:  0        cefdinir 300 MG capsule   Commonly known as:  OMNICEF   Dose:  300 mg   Quantity:  20 capsule        Take 1 capsule (300 mg) by mouth 2 times daily   Refills:  0                Prescriptions were sent or printed at these locations (3 Prescriptions)                   Other Prescriptions                Printed at Department/Unit printer (3 of 3)         azithromycin (ZITHROMAX Z-KARISHMA) 250 MG tablet               benzonatate (TESSALON) 100 MG capsule               cefdinir (OMNICEF) 300 MG capsule                Procedures and tests performed during your visit     Procedure/Test Number of Times Performed    Blood culture 2    CBC with platelets differential 1    Comprehensive metabolic panel 1    HCG qualitative urine (UPT) 1    Mononucleosis screen 1    Rapid strep screen 1    UA with Microscopic 1    XR Chest 2 Views 1      Orders Needing  Specimen Collection     None      Pending Results     Date and Time Order Name Status Description    9/26/2018 1108 Blood culture Preliminary     9/26/2018 1049 Blood culture Preliminary             Pending Culture Results     Date and Time Order Name Status Description    9/26/2018 1108 Blood culture Preliminary     9/26/2018 1049 Blood culture Preliminary             Pending Results Instructions     If you had any lab results that were not finalized at the time of your Discharge, you can call the ED Lab Result RN at [phone]. You will be contacted by this team for any positive Lab results or changes in treatment. The nurses are available 7 days a week from 10A to 6:30P.  You can leave a message 24 hours per day and they will return your call.        Test Results From Your Hospital Stay        9/26/2018 11:25 AM      Component Results     Component Value Ref Range & Units Status    WBC 6.5 4.0 - 11.0 10e9/L Final    RBC Count 4.21 3.8 - 5.2 10e12/L Final    Hemoglobin 12.8 11.7 - 15.7 g/dL Final    Hematocrit 38.8 35.0 - 47.0 % Final    MCV 92 78 - 100 fl Final    MCH 30.4 26.5 - 33.0 pg Final    MCHC 33.0 31.5 - 36.5 g/dL Final    RDW 13.1 10.0 - 15.0 % Final    Platelet Count 200 150 - 450 10e9/L Final    Diff Method Automated Method  Final    % Neutrophils 67.7 % Final    % Lymphocytes 16.6 % Final    % Monocytes 14.6 % Final    % Eosinophils 0.5 % Final    % Basophils 0.3 % Final    % Immature Granulocytes 0.3 % Final    Nucleated RBCs 0 0 /100 Final    Absolute Neutrophil 4.4 1.6 - 8.3 10e9/L Final    Absolute Lymphocytes 1.1 0.8 - 5.3 10e9/L Final    Absolute Monocytes 1.0 0.0 - 1.3 10e9/L Final    Absolute Eosinophils 0.0 0.0 - 0.7 10e9/L Final    Absolute Basophils 0.0 0.0 - 0.2 10e9/L Final    Abs Immature Granulocytes 0.0 0 - 0.4 10e9/L Final    Absolute Nucleated RBC 0.0  Final         9/26/2018 11:43 AM      Component Results     Component Value Ref Range & Units Status    Sodium 139 133 - 144  mmol/L Final    Potassium 4.0 3.4 - 5.3 mmol/L Final    Chloride 105 94 - 109 mmol/L Final    Carbon Dioxide 28 20 - 32 mmol/L Final    Anion Gap 6 3 - 14 mmol/L Final    Glucose 96 70 - 99 mg/dL Final    Urea Nitrogen 9 7 - 30 mg/dL Final    Creatinine 0.56 0.52 - 1.04 mg/dL Final    GFR Estimate >90 >60 mL/min/1.7m2 Final    Non  GFR Calc    GFR Estimate If Black >90 >60 mL/min/1.7m2 Final    African American GFR Calc    Calcium 8.5 8.5 - 10.1 mg/dL Final    Bilirubin Total 0.4 0.2 - 1.3 mg/dL Final    Albumin 3.6 3.4 - 5.0 g/dL Final    Protein Total 7.4 6.8 - 8.8 g/dL Final    Alkaline Phosphatase 66 40 - 150 U/L Final    ALT 24 0 - 50 U/L Final    AST 23 0 - 45 U/L Final         9/26/2018  1:37 PM      Component Results     Component Value Ref Range & Units Status    Mononucleosis Screen Negative NEG^Negative Final         9/26/2018 11:38 AM      Component Results     Component    Specimen Description    Throat    Rapid Strep A Screen (Abnormal)    POSITIVE: Group A Streptococcal antigen detected by immunoassay.         9/26/2018  1:08 PM      Component Results     Component    Specimen Description    Blood Left Arm    Special Requests    Aerobic and anaerobic bottles received    Culture Micro    PENDING         9/26/2018 12:49 PM      Narrative     CHEST TWO VIEWS  9/26/2018 11:46 AM     HISTORY: 43-year-old patient with cough and fever.    COMPARISON: None         Impression     IMPRESSION: Patchy opacity in the left upper lobe corresponding to  pneumonia. Recommend continued radiographic surveillance until  resolution. No pleural effusion or pneumothorax.    JADIEL HARP MD         9/26/2018 11:52 AM      Component Results     Component Value Ref Range & Units Status    Color Urine Colorless  Final    Appearance Urine Clear  Final    Glucose Urine Negative NEG^Negative mg/dL Final    Bilirubin Urine Negative NEG^Negative Final    Ketones Urine Negative NEG^Negative mg/dL Final    Specific  Gravity Urine 1.003 1.003 - 1.035 Final    Blood Urine Negative NEG^Negative Final    pH Urine 7.0 5.0 - 7.0 pH Final    Protein Albumin Urine Negative NEG^Negative mg/dL Final    Urobilinogen mg/dL 0.0 0.0 - 2.0 mg/dL Final    Nitrite Urine Negative NEG^Negative Final    Leukocyte Esterase Urine Negative NEG^Negative Final    Source Midstream Urine  Final    WBC Urine <1 0 - 5 /HPF Final    RBC Urine <1 0 - 2 /HPF Final    Squamous Epithelial /HPF Urine <1 0 - 1 /HPF Final         9/26/2018 11:51 AM      Component Results     Component Value Ref Range & Units Status    HCG Qual Urine Negative NEG^Negative Final    This test is for screening purposes.  Results should be interpreted along with   the clinical picture.  Confirmation testing is available if warranted by   ordering [id number], HCG Quantitative Pregnancy.           9/26/2018  1:08 PM      Component Results     Component    Specimen Description    Blood Right Arm    Special Requests    Aerobic and anaerobic bottles received    Culture Micro    PENDING                Clinical Quality Measure: Blood Pressure Screening     Your blood pressure was checked while you were in the emergency department today. The last reading we obtained was  BP: (!) 149/93 . Please read the guidelines below about what these numbers mean and what you should do about them.  If your systolic blood pressure (the top number) is less than 120 and your diastolic blood pressure (the bottom number) is less than 80, then your blood pressure is normal. There is nothing more that you need to do about it.  If your systolic blood pressure (the top number) is 120-139 or your diastolic blood pressure (the bottom number) is 80-89, your blood pressure may be higher than it should be. You should have your blood pressure rechecked within a year by a primary care provider.  If your systolic blood pressure (the top number) is 140 or greater or your diastolic blood pressure (the bottom number) is 90 or  greater, you may have high blood pressure. High blood pressure is treatable, but if left untreated over time it can put you at risk for heart attack, stroke, or kidney failure. You should have your blood pressure rechecked by a primary care provider within the next 4 weeks.  If your provider in the emergency department today gave you specific instructions to follow-up with your doctor or provider even sooner than that, you should follow that instruction and not wait for up to 4 weeks for your follow-up visit.        Thank you for choosing [facility]       Thank you for choosing [facility] for your care. Our goal is always to provide you with excellent care. Hearing back from our patients is one way we can continue to improve our services. Please take a few minutes to complete the written survey that you may receive in the mail after you visit with us. Thank you!        [employer] Information     [employer] gives you secure access to your electronic health record. If you see a primary care provider, you can also send messages to your care team and make appointments. If you have questions, please call your primary care clinic.  If you do not have a primary care provider, please call [phone] and they will assist you.        Care EveryWhere ID     This is your Care EveryWhere ID. This could be used by other organizations to access your [facility] medical records  [id number]        Equal Access to Services     STANISLAW GARCIA : Landy Liang, wahenrik [place], qaoziel kaalmarajesh bang, gregorio mayo. So [facility] [phone].    ATENCIÓN: Si habla español, tiene a espinal disposición servicios gratuitos de asistencia lingüística. Llame al [phone].    We comply with applicable federal civil rights laws and [place] laws. We do not discriminate on the basis of race, color, national origin, age, disability, sex, sexual orientation, or gender identity.            After Visit Summary        This is your record. Keep this with you and show to your community pharmacist(s) and doctor(s) at your next visit.

## 2018-09-26 NOTE — ED AVS SNAPSHOT
[facility]    [address]    [facility] [address]    Phone:  [phone]    Fax:  [phone]                                       Ree Batres   MRN: [id number]    Department:  [facility]   Date of Visit:  9/26/2018           After Visit Summary Signature Page     I have received my discharge instructions, and my questions have been answered. I have discussed any challenges I see with this plan with the nurse or doctor.    ..........................................................................................................................................  Patient/Patient Representative Signature      ..........................................................................................................................................  Patient Representative Print Name and Relationship to Patient    ..................................................               ................................................  Date                                   Time    ..........................................................................................................................................  Reviewed by Signature/Title    ...................................................              ..............................................  Date                                               Time          22EPIC Rev 08/18

## 2018-09-30 ENCOUNTER — HOSPITAL ENCOUNTER (EMERGENCY)
Facility: CLINIC | Age: 43
Discharge: HOME | End: 2018-09-30
Attending: EMERGENCY MEDICINE | Admitting: EMERGENCY MEDICINE
Payer: COMMERCIAL

## 2018-09-30 ENCOUNTER — APPOINTMENT (OUTPATIENT)
Dept: GENERAL RADIOLOGY | Facility: CLINIC | Age: 43
End: 2018-09-30
Attending: EMERGENCY MEDICINE
Payer: COMMERCIAL

## 2018-09-30 VITALS
HEART RATE: 71 BPM | TEMPERATURE: 99.1 F | DIASTOLIC BLOOD PRESSURE: 86 MMHG | SYSTOLIC BLOOD PRESSURE: 150 MMHG | OXYGEN SATURATION: 97 % | RESPIRATION RATE: 15 BRPM

## 2018-09-30 DIAGNOSIS — J18.9 COMMUNITY ACQUIRED PNEUMONIA OF LEFT LUNG, UNSPECIFIED PART OF LUNG: ICD-10-CM

## 2018-09-30 LAB
ANION GAP SERPL CALCULATED.3IONS-SCNC: 5 MMOL/L (ref 3–14)
BASE EXCESS BLDV CALC-SCNC: 3.6 MMOL/L
BUN SERPL-MCNC: 9 MG/DL (ref 7–30)
CALCIUM SERPL-MCNC: 8.7 MG/DL (ref 8.5–10.1)
CHLORIDE SERPL-SCNC: 104 MMOL/L (ref 94–109)
CO2 SERPL-SCNC: 28 MMOL/L (ref 20–32)
CREAT SERPL-MCNC: 0.56 MG/DL (ref 0.52–1.04)
ERYTHROCYTE [DISTWIDTH] IN BLOOD BY AUTOMATED COUNT: 12.8 % (ref 10–15)
GFR SERPL CREATININE-BSD FRML MDRD: >90 ML/MIN/1.7M2
GLUCOSE SERPL-MCNC: 86 MG/DL (ref 70–99)
HCO3 BLDV-SCNC: 29 MMOL/L (ref 21–28)
HCT VFR BLD AUTO: 38 % (ref 35–47)
HGB BLD-MCNC: 12.5 G/DL (ref 11.7–15.7)
LACTATE BLD-SCNC: 0.7 MMOL/L (ref 0.7–2)
MCH RBC QN AUTO: 29.9 PG (ref 26.5–33)
MCHC RBC AUTO-ENTMCNC: 32.9 G/DL (ref 31.5–36.5)
MCV RBC AUTO: 91 FL (ref 78–100)
O2/TOTAL GAS SETTING VFR VENT: ABNORMAL %
OXYHGB MFR BLDV: 57 %
PCO2 BLDV: 45 MM HG (ref 40–50)
PH BLDV: 7.41 PH (ref 7.32–7.43)
PLATELET # BLD AUTO: 307 10E9/L (ref 150–450)
PO2 BLDV: 31 MM HG (ref 25–47)
POTASSIUM SERPL-SCNC: 3.7 MMOL/L (ref 3.4–5.3)
RBC # BLD AUTO: 4.18 10E12/L (ref 3.8–5.2)
SODIUM SERPL-SCNC: 137 MMOL/L (ref 133–144)
WBC # BLD AUTO: 7.7 10E9/L (ref 4–11)

## 2018-09-30 PROCEDURE — 82805 BLOOD GASES W/O2 SATURATION: CPT | Performed by: EMERGENCY MEDICINE

## 2018-09-30 PROCEDURE — 25000132 ZZH RX MED GY IP 250 OP 250 PS 637: Performed by: EMERGENCY MEDICINE

## 2018-09-30 PROCEDURE — 71046 X-RAY EXAM CHEST 2 VIEWS: CPT

## 2018-09-30 PROCEDURE — 96365 THER/PROPH/DIAG IV INF INIT: CPT

## 2018-09-30 PROCEDURE — 93005 ELECTROCARDIOGRAM TRACING: CPT

## 2018-09-30 PROCEDURE — 83605 ASSAY OF LACTIC ACID: CPT | Performed by: EMERGENCY MEDICINE

## 2018-09-30 PROCEDURE — 99285 EMERGENCY DEPT VISIT HI MDM: CPT | Mod: 25

## 2018-09-30 PROCEDURE — 25000128 H RX IP 250 OP 636: Performed by: EMERGENCY MEDICINE

## 2018-09-30 PROCEDURE — 85027 COMPLETE CBC AUTOMATED: CPT | Performed by: EMERGENCY MEDICINE

## 2018-09-30 PROCEDURE — 96375 TX/PRO/DX INJ NEW DRUG ADDON: CPT

## 2018-09-30 PROCEDURE — 80048 BASIC METABOLIC PNL TOTAL CA: CPT | Performed by: EMERGENCY MEDICINE

## 2018-09-30 RX ORDER — LEVOFLOXACIN 5 MG/ML
750 INJECTION, SOLUTION INTRAVENOUS ONCE
Status: COMPLETED | OUTPATIENT
Start: 2018-09-30 | End: 2018-09-30

## 2018-09-30 RX ORDER — ACETAMINOPHEN 500 MG
1000 TABLET ORAL ONCE
Status: COMPLETED | OUTPATIENT
Start: 2018-09-30 | End: 2018-09-30

## 2018-09-30 RX ORDER — KETOROLAC TROMETHAMINE 15 MG/ML
15 INJECTION, SOLUTION INTRAMUSCULAR; INTRAVENOUS ONCE
Status: COMPLETED | OUTPATIENT
Start: 2018-09-30 | End: 2018-09-30

## 2018-09-30 RX ORDER — LEVOFLOXACIN 750 MG/1
750 TABLET, FILM COATED ORAL DAILY
Qty: 7 TABLET | Refills: 0 | Status: SHIPPED | OUTPATIENT
Start: 2018-09-30 | End: 2019-12-13

## 2018-09-30 RX ADMIN — KETOROLAC TROMETHAMINE 15 MG: 15 INJECTION, SOLUTION INTRAMUSCULAR; INTRAVENOUS at 19:04

## 2018-09-30 RX ADMIN — ACETAMINOPHEN 1000 MG: 500 TABLET, FILM COATED ORAL at 19:09

## 2018-09-30 RX ADMIN — LEVOFLOXACIN 750 MG: 5 INJECTION, SOLUTION INTRAVENOUS at 19:05

## 2018-09-30 RX ADMIN — SODIUM CHLORIDE 1000 ML: 9 INJECTION, SOLUTION INTRAVENOUS at 19:07

## 2018-09-30 ASSESSMENT — ENCOUNTER SYMPTOMS
HEADACHES: 1
DIARRHEA: 0
FEVER: 1
COUGH: 1
NAUSEA: 0
CHILLS: 1
FATIGUE: 1

## 2018-09-30 NOTE — ED PROVIDER NOTES
History     Chief Complaint:  Fever     HPI:   The history is provided by the patient.      Ree Batres is a 43 year old female with a history of basal cell skin cancer, renal disease, and H. Pylori who presents to the emergency department with her spouse for evaluation of a fever. The patient was seen here four days ago after a long race that she had in [place]. She had developed fever, cough, body aches, and a headache. She was found to have a left upper lobe pneumonia and was discharged home on Azithromycin, Omnicef, and Tessalon. She had marked improvement of her symptoms most notably for fever and gain in energy. The cough persisted with only slight improvement, but remained unproductive. Yesterday, she did attempt to run again but was not quite feeling well enough for this. Last night, she did not have a very good night of sleep due to her cough. She awoke with a headache this morning similar to prior ED presentation. Around 1400 today she developed chills and had a measured fever of 101.7. She took Tylenol and rested for some time, but when she got up a couple hours later the temperature was still elevated. Due to the return of her fever and fatigue with a worsening cough, she presents for reevaluation. Here, the patient says that she took her last Azithromycin today. She has not had any chest pain, diarrhea, or nausea. She does report that her son was treated for pneumonia on 9/4. She has a history of H. Pylori ulcer.     CARDIAC RISK FACTORS:  Sex:    Female   Tobacco:   Negative   Hypertension:   Negative   Hyperlipidemia:  Negative   Diabetes:   Negative   Family History:  Negative     Allergies:  Amoxicillin - Hives   Codeine Sulfate - nausea and vomiting   Penicillins - rash      Medications:    Tessalon      Past Medical History:    Basal cell carcinoma of skin   H. Pylori ulcer   Renal disease     Past Surgical History:    Appendectomy   Cholecystectomy   Colposcopy conization   Knee surgery  left     Family History:    Father - esophagus cancer, alzheimer   Sister - thyroid disease     Social History:  Presents with spouse    Tobacco use: Never smoker   Alcohol use: 1 drink a week   Marital Status:        Review of Systems   Constitutional: Positive for chills, fatigue and fever.   Respiratory: Positive for cough.    Cardiovascular: Negative for chest pain.   Gastrointestinal: Negative for diarrhea and nausea.   Neurological: Positive for headaches.   All other systems reviewed and are negative.      Physical Exam     Patient Vitals for the past 24 hrs:   BP Temp Temp src Pulse Heart Rate Resp SpO2   09/30/18 1815 150/86 - - - - - 97 %   09/30/18 1801 145/88 99.1  F (37.3  C) Oral 71 71 15 98 %   09/30/18 1800 (!) 143/100 - - - - - 96 %        Physical Exam    General:   Pleasant, age appropriate female intermittently coughing in bed.  HEENT:    Oropharynx is moist  Eyes:    Conjunctiva normal  Neck:    Supple, no meningismus.     CV:     Regular rate and rhythm.      No murmurs, rubs or gallops.       No JVD or unilateral leg swelling.       2+ radial pulses bilateral.       No lower extremity edema.  PULM:    Inspiratory rales over left upper/mid anterior chest     No respiratory distress.      Good air exchange.     No wheezing.     No stridor.  ABD:    Soft, non-tender, non-distended.       No pulsatile masses.       No rebound, guarding or rigidity.  MSK:     No gross deformity to all four extremities.   LYMPH:   No cervical lymphadenopathy.  NEURO:   Alert. Good muscle tone, no atrophy.  Skin:    Warm, dry and intact.    Psych:    Mood is good and affect is appropriate.        Emergency Department Course   ECG (18:01:59):  Rate 74 bpm. NE interval 154. QRS duration 98. QT/QTc 402/446. P-R-T axes 49 31 34. Normal sinus rhythm. Incomplete RBBB. Borderline EKG. Agree with computer interpretation. Interpreted at 1834 by Vincent Mayen MD.     Imaging:  Radiographic findings were  communicated with the patient and family who voiced understanding of the findings.     XR Chest, PA and LAT:  IMPRESSION: Left upper lobe opacities have decreased. There are  increased patchy opacities within the left midlung suspicious for new  site of pneumonia. No pleural effusion is identified. Cardiac  silhouette is slightly enlarged compared to the prior exam.    Imaging independently reviewed and agree with radiologist interpretation.      Laboratory:  CBC: WNL (WBC 7.7, HGB 12.5, )   BMP: WNL (Creatinine 0.56)   Lactic acid whole blood: 0.7   Blood gas venous oxyhgb: Ph venous 7.41, PCO2 45, PO2  31, Bicarbonate 29 (H),  Oxyhgb 57.      Interventions:  : Toradol 15 mg IV   : Levaquin 750 mg IV Infusion   : NS 1L IV Bolus    : Tylenol 1000 mg PO      Emergency Department Course:  Past medical records, nursing notes, and vitals reviewed.  : I performed an exam of the patient and obtained history, as documented above.     IV inserted and blood drawn. This was sent to the lab for further testing, results above.   Above interventions provided.      The patient was sent for a XR while in the emergency department, findings above.     : I rechecked the patient. Explained findings to patient and spouse.     I personally reviewed the laboratory results with the Patient and spouse and answered all related questions prior to discharge.       : I rechecked the patient. Findings and plan explained to the Patient. Patient discharged home with instructions regarding supportive care, medications, and reasons to return. The importance of close follow-up was reviewed.       Impression & Plan      Medical Decision Makin-year-old female presented to the ED with recurrence of fever and cough after recent diagnosis of left upper lobe pneumonia.  Clinically she appears well.  She is not hypoxic or in respiratory distress.  Laboratory studies are reassuring.  Repeat chest x-ray reveals rapid  resolution of the left upper lobe pneumonia with patchy infiltrate just inferior to the prior site of pneumonia.  Overall her clinical symptoms have improved from 4 days prior although with some recurrence.  Patient given Levaquin in the ED.  We did discuss possible admission for IV antibiotics but I expect for her to do well with addition of Levaquin to her regimen.  Azithromycin is complete.  She will continue her Cefdinir.  Patient safe for discharge home.      Diagnosis:    ICD-10-CM    1. Community acquired pneumonia of left lung, unspecified part of lung J18.9        Disposition:  Discharged to home with plan as outlined.     Discharge Medications:  Discharge Medication List as of 9/30/2018  8:26 PM      START taking these medications    Details   levofloxacin (LEVAQUIN) 750 MG tablet Take 1 tablet (750 mg) by mouth daily, Disp-7 tablet, R-0, Local Print           Scribe Disclosure:   I, Asim Casanova, am serving as a scribe at 6:07 PM on 9/30/2018 to document services personally performed by Vincent Mayen MD based on my observations and the provider's statements to me.       Vincent Mayen MD  9/30/2018   [facility] EMERGENCY DEPARTMENT       Vincent Mayen MD  09/30/18 9309

## 2018-09-30 NOTE — ED AVS SNAPSHOT
[facility] Emergency Department    [address]    [facility] [address]    Phone:  [phone]    Fax:  [phone]                                       Ree Batres   MRN: [id number]    Department:  [facility] Emergency Department   Date of Visit:  9/30/2018           After Visit Summary Signature Page     I have received my discharge instructions, and my questions have been answered. I have discussed any challenges I see with this plan with the nurse or doctor.    ..........................................................................................................................................  Patient/Patient Representative Signature      ..........................................................................................................................................  Patient Representative Print Name and Relationship to Patient    ..................................................               ................................................  Date                                   Time    ..........................................................................................................................................  Reviewed by Signature/Title    ...................................................              ..............................................  Date                                               Time          22EPIC Rev 08/18

## 2018-09-30 NOTE — ED AVS SNAPSHOT
[facility] Emergency Department    [address] [place] [address]    Phone:  [phone]    Fax:  [phone]                                       Ree Batres   MRN: [id number]    Department:  [facility] Emergency Department   Date of Visit:  9/30/2018           Patient Information     Date Of Birth          1975        Your diagnoses for this visit were:     Community acquired pneumonia of left lung, unspecified part of lung        You were seen by Vincent Mayen MD.        Discharge Instructions       Please make an appointment to follow up with your primary care provider in 3 days even if entirely better.      Discharge Instructions   Pneumonia    You were seen today for a chest infection or inflammation. If your doctor decided this was due to a bacterial infection, you may need an antibiotic.     Return to the Emergency Department if:    Your breathing gets much worse.    You are very weak, or feel much more ill.    You develop new symptoms, such as chest pain.    You cough up blood.    You are vomiting enough that you can t keep fluids or your medicine down.    What can I do to help myself?    Fill any prescriptions the doctor gave you and take them right away--especially antibiotics. Be sure to finish the whole antibiotic prescription.    Avoid smoke, because this can make your symptoms worse. If you smoke, this may be a good time to quit! Consider using nicotine lozenges, gum, or patches to reduce cravings.     If you have a fever, Tylenol  (acetaminophen), Motrin  (ibuprofen), or Advil  (ibuprofen) may help bring fever down and may help you feel more comfortable. Be sure to read and follow the package directions, and ask your doctor if you have questions.    Be sure to get your flu shot each year.  The pneumonia shot can help prevent pneumonia.  Probiotics: If you have been given an antibiotic, you may want to also take a probiotic pill or eat  "yogurt with live cultures. Probiotics have \"good bacteria\" to help your intestines stay healthy. Studies have shown that probiotics help prevent diarrhea and other intestine problems (including C. diff infection) when you take antibiotics. You can buy these without a prescription in the pharmacy section of the store.     If your doctor has told you to follow-up at your clinic, be sure to call right away and go to your appointment.  If there is any problem with keeping your appointment, call your doctor or return to the Emergency Department.       Remember that you can always come back to the Emergency Department if you are not able to see your regular doctor in the amount of time listed above, if you get any new symptoms, or if there is anything that worries you.          24 Hour Appointment Hotline       To make an appointment at any [facility], call [phone] ([phone]). If you don't have a family doctor or clinic, we will help you find one. [facility] are conveniently located to serve the needs of you and your family.             Review of your medicines      START taking        Dose / Directions Last dose taken    levofloxacin 750 MG tablet   Commonly known as:  LEVAQUIN   Dose:  750 mg   Quantity:  7 tablet        Take 1 tablet (750 mg) by mouth daily   Refills:  0          Our records show that you are taking the medicines listed below. If these are incorrect, please call your family doctor or clinic.        Dose / Directions Last dose taken    benzonatate 100 MG capsule   Commonly known as:  TESSALON   Dose:  100 mg   Quantity:  15 capsule        Take 1 capsule (100 mg) by mouth 3 times daily as needed for cough   Refills:  0        cefdinir 300 MG capsule   Commonly known as:  OMNICEF   Dose:  300 mg   Quantity:  20 capsule        Take 1 capsule (300 mg) by mouth 2 times daily   Refills:  0          STOP taking        Dose Reason for stopping Comments    azithromycin 250 MG tablet "   Commonly known as:  ZITHROMAX Z-KARISHMA                      Prescriptions were sent or printed at these locations (1 Prescription)                   Other Prescriptions                Printed at Department/Unit printer (1 of 1)         levofloxacin (LEVAQUIN) 750 MG tablet                Procedures and tests performed during your visit     Basic metabolic panel (BMP)    Blood gas venous and oxyhgb    CBC (platelets, no diff)    Chest XR,  PA & LAT    Draw and hold blood cultures    EKG 12 lead    Lactic acid whole blood    Peripheral IV catheter      Orders Needing Specimen Collection     None      Pending Results     Date and Time Order Name Status Description    9/30/2018 1804 EKG 12 lead Preliminary             Pending Culture Results     No orders found from 9/28/2018 to 10/1/2018.            Pending Results Instructions     If you had any lab results that were not finalized at the time of your Discharge, you can call the ED Lab Result RN at [phone]. You will be contacted by this team for any positive Lab results or changes in treatment. The nurses are available 7 days a week from 10A to 6:30P.  You can leave a message 24 hours per day and they will return your call.        Test Results From Your Hospital Stay        9/30/2018  6:46 PM      Component Results     Component Value Ref Range & Units Status    WBC 7.7 4.0 - 11.0 10e9/L Final    RBC Count 4.18 3.8 - 5.2 10e12/L Final    Hemoglobin 12.5 11.7 - 15.7 g/dL Final    Hematocrit 38.0 35.0 - 47.0 % Final    MCV 91 78 - 100 fl Final    MCH 29.9 26.5 - 33.0 pg Final    MCHC 32.9 31.5 - 36.5 g/dL Final    RDW 12.8 10.0 - 15.0 % Final    Platelet Count 307 150 - 450 10e9/L Final         9/30/2018  6:59 PM      Component Results     Component Value Ref Range & Units Status    Sodium 137 133 - 144 mmol/L Final    Potassium 3.7 3.4 - 5.3 mmol/L Final    Chloride 104 94 - 109 mmol/L Final    Carbon Dioxide 28 20 - 32 mmol/L Final    Anion Gap 5 3 - 14 mmol/L  Final    Glucose 86 70 - 99 mg/dL Final    Urea Nitrogen 9 7 - 30 mg/dL Final    Creatinine 0.56 0.52 - 1.04 mg/dL Final    GFR Estimate >90 >60 mL/min/1.7m2 Final    Non  GFR Calc    GFR Estimate If Black >90 >60 mL/min/1.7m2 Final    African American GFR Calc    Calcium 8.7 8.5 - 10.1 mg/dL Final         9/30/2018  6:48 PM      Component Results     Component Value Ref Range & Units Status    Ph Venous 7.41 7.32 - 7.43 pH Final    PCO2 Venous 45 40 - 50 mm Hg Final    PO2 Venous 31 25 - 47 mm Hg Final    Bicarbonate Venous 29 (H) 21 - 28 mmol/L Final    FIO2 room air  Final    Oxyhemoglobin Venous 57 % Final    Base Excess Venous 3.6 mmol/L Final    Abnormal Result, Ref range: -7.7 to 1.9         9/30/2018  6:48 PM      Component Results     Component Value Ref Range & Units Status    Lactic Acid 0.7 0.7 - 2.0 mmol/L Final         9/30/2018  7:38 PM      Narrative     CHEST TWO VIEWS   9/30/2018 7:22 PM     HISTORY: Recent left upper lobe pneumonia - worsening cough and fever.    COMPARISON: 9/26/2018        Impression     IMPRESSION: Left upper lobe opacities have decreased. There are  increased patchy opacities within the left midlung suspicious for new  site of pneumonia. No pleural effusion is identified. Cardiac  silhouette is slightly enlarged compared to the prior exam.    SABRINA OSBORNE MD                Clinical Quality Measure: Blood Pressure Screening     Your blood pressure was checked while you were in the emergency department today. The last reading we obtained was  BP: 150/86 . Please read the guidelines below about what these numbers mean and what you should do about them.  If your systolic blood pressure (the top number) is less than 120 and your diastolic blood pressure (the bottom number) is less than 80, then your blood pressure is normal. There is nothing more that you need to do about it.  If your systolic blood pressure (the top number) is 120-139 or your diastolic blood  pressure (the bottom number) is 80-89, your blood pressure may be higher than it should be. You should have your blood pressure rechecked within a year by a primary care provider.  If your systolic blood pressure (the top number) is 140 or greater or your diastolic blood pressure (the bottom number) is 90 or greater, you may have high blood pressure. High blood pressure is treatable, but if left untreated over time it can put you at risk for heart attack, stroke, or kidney failure. You should have your blood pressure rechecked by a primary care provider within the next 4 weeks.  If your provider in the emergency department today gave you specific instructions to follow-up with your doctor or provider even sooner than that, you should follow that instruction and not wait for up to 4 weeks for your follow-up visit.        Thank you for choosing [facility]       Thank you for choosing [facility] for your care. Our goal is always to provide you with excellent care. Hearing back from our patients is one way we can continue to improve our services. Please take a few minutes to complete the written survey that you may receive in the mail after you visit with us. Thank you!        [employer] Information     [employer] gives you secure access to your electronic health record. If you see a primary care provider, you can also send messages to your care team and make appointments. If you have questions, please call your primary care clinic.  If you do not have a primary care provider, please call [phone] and they will assist you.        Care EveryWhere ID     This is your Care EveryWhere ID. This could be used by other organizations to access your [facility] medical records  [id number]        Equal Access to Services     STANISLAW GARCIA : Hadii yudith webero Soyfn, waaxda lubuddyadaha, qaybta kaalgregorio coy . So [facility] [phone].    ATENCIÓN: Si jerilyn espjenn, amy a espinal disposición  servicios gratuitos de asistencia lingüística. Dre randolph [phone].    We comply with applicable federal civil rights laws and [place] laws. We do not discriminate on the basis of race, color, national origin, age, disability, sex, sexual orientation, or gender identity.            After Visit Summary       This is your record. Keep this with you and show to your community pharmacist(s) and doctor(s) at your next visit.

## 2018-09-30 NOTE — ED TRIAGE NOTES
Pt comes in with elevated temp., headache  and weakness. Pt seen at emergency room on [address] and given 2 ABX.  aND TESSLON PILLS AFTER BEING DX WITH PNX. AND STREP THROAT.

## 2018-10-01 LAB — INTERPRETATION ECG - MUSE: NORMAL

## 2018-10-01 NOTE — DISCHARGE INSTRUCTIONS
"Please make an appointment to follow up with your primary care provider in 3 days even if entirely better.      Discharge Instructions   Pneumonia    You were seen today for a chest infection or inflammation. If your doctor decided this was due to a bacterial infection, you may need an antibiotic.     Return to the Emergency Department if:    Your breathing gets much worse.    You are very weak, or feel much more ill.    You develop new symptoms, such as chest pain.    You cough up blood.    You are vomiting enough that you can t keep fluids or your medicine down.    What can I do to help myself?    Fill any prescriptions the doctor gave you and take them right away--especially antibiotics. Be sure to finish the whole antibiotic prescription.    Avoid smoke, because this can make your symptoms worse. If you smoke, this may be a good time to quit! Consider using nicotine lozenges, gum, or patches to reduce cravings.     If you have a fever, Tylenol  (acetaminophen), Motrin  (ibuprofen), or Advil  (ibuprofen) may help bring fever down and may help you feel more comfortable. Be sure to read and follow the package directions, and ask your doctor if you have questions.    Be sure to get your flu shot each year.  The pneumonia shot can help prevent pneumonia.  Probiotics: If you have been given an antibiotic, you may want to also take a probiotic pill or eat yogurt with live cultures. Probiotics have \"good bacteria\" to help your intestines stay healthy. Studies have shown that probiotics help prevent diarrhea and other intestine problems (including C. diff infection) when you take antibiotics. You can buy these without a prescription in the pharmacy section of the store.     If your doctor has told you to follow-up at your clinic, be sure to call right away and go to your appointment.  If there is any problem with keeping your appointment, call your doctor or return to the Emergency Department.       Remember that you can " always come back to the Emergency Department if you are not able to see your regular doctor in the amount of time listed above, if you get any new symptoms, or if there is anything that worries you.

## 2018-10-02 LAB
BACTERIA SPEC CULT: NO GROWTH
BACTERIA SPEC CULT: NO GROWTH
Lab: NORMAL
Lab: NORMAL
SPECIMEN SOURCE: NORMAL
SPECIMEN SOURCE: NORMAL

## 2018-10-03 ENCOUNTER — OFFICE VISIT (OUTPATIENT)
Dept: FAMILY MEDICINE | Facility: CLINIC | Age: 43
End: 2018-10-03

## 2018-10-03 VITALS
BODY MASS INDEX: 22.6 KG/M2 | HEART RATE: 56 BPM | WEIGHT: 144 LBS | TEMPERATURE: 97.7 F | HEIGHT: 67 IN | RESPIRATION RATE: 16 BRPM | DIASTOLIC BLOOD PRESSURE: 76 MMHG | SYSTOLIC BLOOD PRESSURE: 130 MMHG | OXYGEN SATURATION: 97 %

## 2018-10-03 DIAGNOSIS — R05.9 COUGH: ICD-10-CM

## 2018-10-03 DIAGNOSIS — J18.9 PNEUMONIA OF LEFT UPPER LOBE DUE TO INFECTIOUS ORGANISM: Primary | ICD-10-CM

## 2018-10-03 PROCEDURE — 99213 OFFICE O/P EST LOW 20 MIN: CPT | Performed by: FAMILY MEDICINE

## 2018-10-03 RX ORDER — BENZONATATE 100 MG/1
100 CAPSULE ORAL 3 TIMES DAILY PRN
Qty: 21 CAPSULE | Refills: 0 | Status: SHIPPED | OUTPATIENT
Start: 2018-10-03 | End: 2019-12-13

## 2018-10-03 NOTE — PROGRESS NOTES
SUBJECTIVE:   Ree Batres is a 43 year old female who presents to clinic today for the following health issues:2 ER visits with left pneumonias patchy with positive strept. Coughing at night keeps her awake but her fever is gone and her energy better. No appetite, unable to run, and no GI complaints      ED/UC Followup:    Facility:  [facility]  Date of visit: 9/26/2018 and 9/30/2018  Reason for visit: Pneumonia and Strep Throat  Current Status: Not feeling any better even with 2 antibiotics             Problem list and histories reviewed & adjusted, as indicated.  Additional history: as documented    Patient Active Problem List   Diagnosis     History of basal cell cancer     ACP (advance care planning)     Health Care Home     Past Surgical History:   Procedure Laterality Date     APPENDECTOMY       CHOLECYSTECTOMY      may 2009     COLPOSCOPY, CONIZATION, COMBINED  10/19/2012    Procedure: COMBINED COLPOSCOPY, CONIZATION;  Loop Electrosurgical Excision Procedure, Colposcopy;  Surgeon: Anahi Huang MD;  Location: [facility] OR     KNEE SURGERY Left        Social History   Substance Use Topics     Smoking status: Never Smoker     Smokeless tobacco: Never Used     Alcohol use 0.5 oz/week     1 Standard drinks or equivalent per week     Family History   Problem Relation Age of Onset     Cancer Father 73     Esophagus cancer     Alzheimer Disease Father 80     Thyroid Disease Sister          Current Outpatient Prescriptions   Medication Sig Dispense Refill     benzonatate (TESSALON) 100 MG capsule Take 1 capsule (100 mg) by mouth 3 times daily as needed for cough 21 capsule 0     cefdinir (OMNICEF) 300 MG capsule Take 1 capsule (300 mg) by mouth 2 times daily 20 capsule 0     levofloxacin (LEVAQUIN) 750 MG tablet Take 1 tablet (750 mg) by mouth daily 7 tablet 0     benzonatate (TESSALON) 100 MG capsule Take 1 capsule (100 mg) by mouth 3 times daily as needed for cough 15 capsule 0     Allergies  "  Allergen Reactions     Amoxicillin Hives     Codeine Sulfate Nausea and Vomiting     Penicillins Rash     BP Readings from Last 3 Encounters:   10/03/18 130/76   09/30/18 150/86   09/26/18 151/90    Wt Readings from Last 3 Encounters:   10/03/18 65.3 kg (144 lb)   09/24/18 67.8 kg (149 lb 6.4 oz)   02/11/17 77.1 kg (170 lb)                  Labs reviewed in EPIC    Reviewed and updated as needed this visit by clinical staff  Tobacco  Allergies  Meds  Problems       Reviewed and updated as needed this visit by Provider         ROS:  Constitutional, HEENT, cardiovascular, pulmonary, gi and gu systems are negative, except as otherwise noted.    OBJECTIVE:     /76 (BP Location: Right arm, Patient Position: Chair, Cuff Size: Adult Regular)  Pulse 56  Temp 97.7  F (36.5  C) (Oral)  Resp 16  Ht 1.702 m (5' 7\")  Wt 65.3 kg (144 lb)  LMP 09/24/2018  SpO2 97%  Breastfeeding? No  BMI 22.55 kg/m2  Body mass index is 22.55 kg/(m^2).  GENERAL: healthy, alert and no distress  GENERAL: fatigued  NECK: no adenopathy, no asymmetry, masses, or scars and thyroid normal to palpation  RESP: lungs clear to auscultation - no rales, rhonchi or wheezes  RESP: no rales , no rhonchi, no wheezes and dry cough  CV: regular rate and rhythm, normal S1 S2, no S3 or S4, no murmur, click or rub, no peripheral edema and peripheral pulses strong  ABDOMEN: soft, nontender, no hepatosplenomegaly, no masses and bowel sounds normal  MS: no gross musculoskeletal defects noted, no edema    Diagnostic Test Results:  none     ASSESSMENT/PLAN:       (J18.1) Pneumonia of left upper lobe due to infectious organism (H)  (primary encounter diagnosis)  Comment: probable viral etiology by history  Plan: Symptomatic care with decongestants, fluids, tylenol/advil prn. Sips of clear water and cough drops now back to work.    Recheck 10/30/2018 for a repeat CXR. RTC sooner prn problems.    In addition, I have suggested that the patient   monitor for " symptoms of bacterial infection expecting slow gradual resolution of viral URI as the natural course.      (R05) Cough  Plan: benzonatate (TESSALON) 100 MG capsule        Potential medication side effects were discussed with the patient; let me know if any occur.        FUTURE APPOINTMENTS:       - Follow-up visit in 1 month  SELF MONITORING:       - temperature, cough and symptoms      Katharina Lincoln MD  [facility]

## 2018-10-03 NOTE — PATIENT INSTRUCTIONS
probable viral etiology by history  Plan: Symptomatic care with decongestants, fluids, tylenol/advil prn. Sips of clear water and cough drops now back to work.    Recheck 10/30/2018 for a repeat CXR. RTC sooner prn problems.    In addition, I have suggested that the patient   monitor for symptoms of bacterial infection expecting slow gradual resolution of viral URI as the natural course.

## 2019-09-10 ENCOUNTER — HOSPITAL ENCOUNTER (OUTPATIENT)
Dept: MAMMOGRAPHY | Facility: CLINIC | Age: 44
Discharge: HOME | End: 2019-09-10
Attending: OBSTETRICS & GYNECOLOGY | Admitting: OBSTETRICS & GYNECOLOGY
Payer: COMMERCIAL

## 2019-09-10 DIAGNOSIS — Z12.31 VISIT FOR SCREENING MAMMOGRAM: ICD-10-CM

## 2019-09-10 PROCEDURE — 77063 BREAST TOMOSYNTHESIS BI: CPT

## 2019-12-08 ENCOUNTER — HEALTH MAINTENANCE LETTER (OUTPATIENT)
Age: 44
End: 2019-12-08

## 2019-12-13 ENCOUNTER — OFFICE VISIT (OUTPATIENT)
Dept: FAMILY MEDICINE | Facility: CLINIC | Age: 44
End: 2019-12-13

## 2019-12-13 VITALS
DIASTOLIC BLOOD PRESSURE: 88 MMHG | TEMPERATURE: 98.2 F | RESPIRATION RATE: 16 BRPM | OXYGEN SATURATION: 97 % | HEIGHT: 68 IN | WEIGHT: 150.2 LBS | HEART RATE: 60 BPM | BODY MASS INDEX: 22.76 KG/M2 | SYSTOLIC BLOOD PRESSURE: 140 MMHG

## 2019-12-13 DIAGNOSIS — R03.0 ELEVATED BLOOD PRESSURE READING WITHOUT DIAGNOSIS OF HYPERTENSION: Primary | ICD-10-CM

## 2019-12-13 DIAGNOSIS — Z82.49 FAMILY HISTORY OF HYPERTENSION IN MOTHER: ICD-10-CM

## 2019-12-13 DIAGNOSIS — Z82.49 FAMILY HISTORY OF HYPERTENSION IN FATHER: ICD-10-CM

## 2019-12-13 LAB
ALBUMIN SERPL-MCNC: 4.7 G/DL (ref 3.6–5.1)
ALBUMIN/GLOB SERPL: 1.6 {RATIO} (ref 1–2.5)
ALP SERPL-CCNC: 50 U/L (ref 33–130)
ALT 1742-6: 9 U/L (ref 0–32)
AST 1920-8: 13 U/L (ref 0–35)
BILIRUB SERPL-MCNC: 0.7 MG/DL (ref 0.2–1.2)
BUN SERPL-MCNC: 15 MG/DL (ref 7–25)
BUN/CREATININE RATIO: 16 (ref 6–22)
CALCIUM SERPL-MCNC: 9.6 MG/DL (ref 8.6–10.3)
CHLORIDE SERPLBLD-SCNC: 104.3 MMOL/L (ref 98–110)
CO2 SERPL-SCNC: 25.9 MMOL/L (ref 20–32)
CREAT SERPL-MCNC: 0.94 MG/DL (ref 0.7–1.18)
GLOBULIN, CALCULATED - QUEST: 2.9 (ref 1.9–3.7)
GLUCOSE SERPL-MCNC: 96 MG/DL (ref 60–99)
POTASSIUM SERPL-SCNC: 4.51 MMOL/L (ref 3.5–5.3)
PROT SERPL-MCNC: 7.6 G/DL (ref 6.1–8.1)
SODIUM SERPL-SCNC: 140.1 MMOL/L (ref 135–146)

## 2019-12-13 PROCEDURE — 36415 COLL VENOUS BLD VENIPUNCTURE: CPT | Performed by: FAMILY MEDICINE

## 2019-12-13 PROCEDURE — 99214 OFFICE O/P EST MOD 30 MIN: CPT | Performed by: FAMILY MEDICINE

## 2019-12-13 PROCEDURE — 80053 COMPREHEN METABOLIC PANEL: CPT | Performed by: FAMILY MEDICINE

## 2019-12-13 SDOH — ECONOMIC STABILITY: FOOD INSECURITY: WITHIN THE PAST 12 MONTHS, YOU WORRIED THAT YOUR FOOD WOULD RUN OUT BEFORE YOU GOT MONEY TO BUY MORE.: NEVER TRUE

## 2019-12-13 SDOH — ECONOMIC STABILITY: TRANSPORTATION INSECURITY
IN THE PAST 12 MONTHS, HAS LACK OF TRANSPORTATION KEPT YOU FROM MEETINGS, WORK, OR FROM GETTING THINGS NEEDED FOR DAILY LIVING?: NO

## 2019-12-13 SDOH — ECONOMIC STABILITY: FOOD INSECURITY: WITHIN THE PAST 12 MONTHS, THE FOOD YOU BOUGHT JUST DIDN'T LAST AND YOU DIDN'T HAVE MONEY TO GET MORE.: NEVER TRUE

## 2019-12-13 SDOH — ECONOMIC STABILITY: TRANSPORTATION INSECURITY
IN THE PAST 12 MONTHS, HAS THE LACK OF TRANSPORTATION KEPT YOU FROM MEDICAL APPOINTMENTS OR FROM GETTING MEDICATIONS?: NO

## 2019-12-13 SDOH — ECONOMIC STABILITY: INCOME INSECURITY: HOW HARD IS IT FOR YOU TO PAY FOR THE VERY BASICS LIKE FOOD, HOUSING, MEDICAL CARE, AND HEATING?: NOT HARD AT ALL

## 2019-12-13 ASSESSMENT — MIFFLIN-ST. JEOR: SCORE: 1384.8

## 2019-12-13 NOTE — NURSING NOTE
Ree is here today for high BP readings at her dentist.    Pre-visit Screening:  Immunizations:  unknown  Colonoscopy:  NA  Mammogram: is up to date  Asthma Action Test/Plan:  NA  PHQ9:  NA  GAD7:  NA  Questioned patient about current smoking habits Pt. has never smoked.  Ok to leave detailed message on voice mail for today's visit only Yes, phone # [phone]

## 2019-12-13 NOTE — PROGRESS NOTES
"  SUBJECTIVE:  Ree Batres is an 44 year old female who presents for evaluation of   Hypertension after going to dentist with 178/96 readings.   She indicates that she is feeling well and   denies any symptoms referable to her elevated blood pressure.   Specifically denies chest pain, palpitations, dyspnea, orthopnea,   PND or peripheral edema. Current medication regimen is as listed   below. Patient denies any side effects of medication.    Family history: positive for hypertension and cardiovascular disease  Age at onset of elevated blood pressure: possible this year  Cardiovascular risk factors: family history  Use of agents associated with hypertension: none  History of renal disease: positive for kidney stones  History of flank trauma: negative    No current outpatient medications on file.     No current facility-administered medications for this visit.      Allergies   Allergen Reactions     Amoxicillin Hives     Codeine Sulfate Nausea and Vomiting     Penicillins Rash       Social History     Tobacco Use     Smoking status: Never Smoker     Smokeless tobacco: Never Used   Substance Use Topics     Alcohol use: Yes     Alcohol/week: 0.8 standard drinks     Types: 1 Standard drinks or equivalent per week       OBJECTIVE:  BP (!) 140/88 (BP Location: Right arm, Patient Position: Sitting, Cuff Size: Adult Regular)   Pulse 60   Temp 98.2  F (36.8  C) (Oral)   Ht 1.727 m (5' 8\")   Wt 68.1 kg (150 lb 3.2 oz)   SpO2 97%   BMI 22.84 kg/m    Repeat BP R arm seated = 140/88  with regular size cuff.  Fundi: deferred  Thyroid: normal to inspection and palpation  Lungs: negative, Percussion normal. Good diaphragmatic excursion. Lungs clear  Heart: negative, PMI normal. No lifts, heaves, or thrills. RRR. No murmurs, clicks gallops or rub  Peripheral pulses: radial=4/4, femoral=4/4, popliteal=4/4, dorsalis pedis=4/4,    ASSESSMENT:  (R03.0) Elevated blood pressure reading without diagnosis of hypertension  (primary " encounter diagnosis)  Comment: read handout/life style changes  Plan: Comprehensive Metobolic Panel (BFP), VENOUS         COLLECTION, CARDIOLOGY PROCEDURE ADULT         REFERRAL, 24 Hour Blood Pressure Monitor -         Adult         a BP cuff/ consider medications. Let's get more reading with a 24 hour BP monitor.    Patient Education: Reviewed risks of hypertension and principles of   treatment.

## 2019-12-19 ENCOUNTER — HOSPITAL ENCOUNTER (OUTPATIENT)
Dept: CARDIOLOGY | Facility: CLINIC | Age: 44
Discharge: HOME | End: 2019-12-19
Attending: FAMILY MEDICINE | Admitting: FAMILY MEDICINE
Payer: COMMERCIAL

## 2019-12-19 DIAGNOSIS — R03.0 ELEVATED BLOOD PRESSURE READING WITHOUT DIAGNOSIS OF HYPERTENSION: ICD-10-CM

## 2019-12-19 PROCEDURE — 93790 AMBL BP MNTR W/SW I&R: CPT | Performed by: INTERNAL MEDICINE

## 2019-12-19 PROCEDURE — 93788 AMBL BP MNTR W/SW A/R: CPT

## 2019-12-20 DIAGNOSIS — Z13.6 SCREENING FOR CARDIOVASCULAR CONDITION: Primary | ICD-10-CM

## 2019-12-20 NOTE — PROGRESS NOTES
Orders entered: EKG, New consult, Appt 12-31-19.       Peter RN, BSN    [facility]

## 2019-12-23 ENCOUNTER — TELEPHONE (OUTPATIENT)
Dept: CARDIOLOGY | Facility: CLINIC | Age: 44
End: 2019-12-23

## 2019-12-23 NOTE — TELEPHONE ENCOUNTER
Patient is scheduled to see Dr. Wade on 12-31-19.     Per PMD, PMD only wanted a 24 hour BP monitor to assess patient's elevated BP. Patient was not referred.     Appt is not needed at this time.    Called patient to review. Left patient a message. Awaiting call back.     Peter RN, BSN

## 2019-12-26 ENCOUNTER — TELEPHONE (OUTPATIENT)
Dept: FAMILY MEDICINE | Facility: CLINIC | Age: 44
End: 2019-12-26

## 2019-12-27 NOTE — TELEPHONE ENCOUNTER
Ree left a message wanting to hear back from Dr. Lincoln with her results from the 24 hour blood pressure. Her cardio appt is 12/31/19. Routing to Dr. Lincoln.      Ree's # [phone]

## 2019-12-27 NOTE — TELEPHONE ENCOUNTER
I informed Ree. She would like the results on My Chart. She wants to know what are next steps because her bp cuff at home reads high.

## 2019-12-30 NOTE — TELEPHONE ENCOUNTER
I believe she can not trust her home BP cuff. This may be a situation where her anxiety is up when she takes her BP reading contributing to the false elevation.    I would bring in the cuff and let's check it with our cuff.  I do not think any medication is indicated after the 24 hour ambulatory screen.

## 2019-12-31 ENCOUNTER — OFFICE VISIT (OUTPATIENT)
Dept: CARDIOLOGY | Facility: CLINIC | Age: 44
End: 2019-12-31
Payer: COMMERCIAL

## 2019-12-31 VITALS
SYSTOLIC BLOOD PRESSURE: 124 MMHG | BODY MASS INDEX: 22.05 KG/M2 | WEIGHT: 145.5 LBS | HEART RATE: 60 BPM | HEIGHT: 68 IN | DIASTOLIC BLOOD PRESSURE: 80 MMHG

## 2019-12-31 DIAGNOSIS — Z13.6 SCREENING FOR CARDIOVASCULAR CONDITION: ICD-10-CM

## 2019-12-31 PROCEDURE — 93000 ELECTROCARDIOGRAM COMPLETE: CPT | Performed by: INTERNAL MEDICINE

## 2019-12-31 PROCEDURE — 99213 OFFICE O/P EST LOW 20 MIN: CPT | Performed by: INTERNAL MEDICINE

## 2019-12-31 ASSESSMENT — MIFFLIN-ST. JEOR: SCORE: 1363.48

## 2019-12-31 NOTE — TELEPHONE ENCOUNTER
I just tried to call Ree twice but she must have low cell service because she couldn't hear me and she was cutting in and out.

## 2019-12-31 NOTE — TELEPHONE ENCOUNTER
Left a detailed voicemail stating that Dr. Lincoln recommends her to bring in her home bp machine and we will compare manual versus her's and they can discuss next steps during the office visit.

## 2019-12-31 NOTE — LETTER
2019    Katharina Lincoln MD  [address], [address]  [place] [address]    RE: Ree Batres       Dear Colleague,    I had the pleasure of seeing Ree Batres in the [facility].    HISTORY:    Ree Batres is a pleasant 44-year-old female who recently visited the dentist and had a blood pressure of 190 systolic.  It was rechecked with a second cuff and was identical and she was told to see her physician within a week.  She saw her primary care doctor and her blood pressure was once again elevated to an identical level.  A 24-hour ambulatory blood pressure monitor was ordered and a cardiology consult was arranged.    Ree has never had problems with hypertension.  She is physically very active, continuing to run marathons on a periodic basis.  Denies exertional chest, arm, neck, or jaw discomfort, symptoms of palpitations, PND/orthopnea, syncope or near syncope, strokelike symptoms, peripheral edema, or claudication.    Cardiac risk factors are negative.  She has not been diagnosed with hypertension in the past and has no history of smoking or diabetes.  She does have a family history of coronary artery disease but not in immediate family members, 1 of her grandfathers  in his early 60s and a grandmother  in her late 70s of CAD.  No immediate family members have had problems with CAD.    The 24-hour ambulatory blood pressure monitor was reviewed with Ree today.  It is completely normal showing normal nighttime and daytime blood pressures with normal nighttime dipping.      ASSESSMENT/PLAN:    1.  Suspected hypertension.  The blood pressure on 24-hour ambulatory monitoring is completely normal.  Her high blood pressure was undoubtedly secondary to stress.  She may have an exaggerated blood pressure response to stress, but this does not put her at increased risk of future problems.  In reviewing her chart and her cardiovascular situation I realized that she  has never had a cholesterol checked, at least not for the last 15 years.  I have ordered a fasting lipid profile and will contact her with the results.  I encouraged her to continue her healthy lifestyle.    Thank you for inviting me to participate in your patient's care.  No follow-up is planned other than review of her lipid profile when available.  Please do not hesitate to call if there are further questions or concerns.    Orders Placed This Encounter   Procedures     Lipid Profile     No orders of the defined types were placed in this encounter.    There are no discontinued medications.    10 year ASCVD risk: The ASCVD Risk score (Juarez DC Jr., et al., 2013) failed to calculate for the following reasons:    Cannot find a previous HDL lab    Cannot find a previous total cholesterol lab    Encounter Diagnosis   Name Primary?     Screening for cardiovascular condition        CURRENT MEDICATIONS:  No current outpatient medications on file.       ALLERGIES     Allergies   Allergen Reactions     Amoxicillin Hives     Codeine Sulfate Nausea and Vomiting     Penicillins Rash       PAST MEDICAL HISTORY:  Past Medical History:   Diagnosis Date     Basal cell carcinoma of skin      H pylori ulcer     resolved     Renal disease     kidney stone in past/kidney infections       PAST SURGICAL HISTORY:  Past Surgical History:   Procedure Laterality Date     APPENDECTOMY       CHOLECYSTECTOMY      may 2009     COLPOSCOPY, CONIZATION, COMBINED  10/19/2012    Procedure: COMBINED COLPOSCOPY, CONIZATION;  Loop Electrosurgical Excision Procedure, Colposcopy;  Surgeon: Anahi Huang MD;  Location: [facility] OR     KNEE SURGERY Left        FAMILY HISTORY:  Family History   Problem Relation Age of Onset     Hypertension Mother      Cancer Father 73        Esophagus cancer     Alzheimer Disease Father 80     Hypertension Father      Thyroid Disease Sister        SOCIAL HISTORY:  Social History     Socioeconomic History     Marital status:  "     Spouse name: None     Number of children: 1     Years of education: None     Highest education level: None   Occupational History     Employer: OTHER     Comment: [employer]   Social Needs     Financial resource strain: Not hard at all     Food insecurity:     Worry: Never true     Inability: Never true     Transportation needs:     Medical: No     Non-medical: No   Tobacco Use     Smoking status: Never Smoker     Smokeless tobacco: Never Used   Substance and Sexual Activity     Alcohol use: Yes     Alcohol/week: 0.8 standard drinks     Types: 1 Standard drinks or equivalent per week     Drug use: No     Sexual activity: Not Currently     Partners: Male   Lifestyle     Physical activity:     Days per week: None     Minutes per session: None     Stress: None   Relationships     Social connections:     Talks on phone: None     Gets together: None     Attends Hinduism service: None     Active member of club or organization: None     Attends meetings of clubs or organizations: None     Relationship status: None     Intimate partner violence:     Fear of current or ex partner: None     Emotionally abused: None     Physically abused: None     Forced sexual activity: None   Other Topics Concern     Parent/sibling w/ CABG, MI or angioplasty before 65F 55M? Not Asked   Social History Narrative     None       Review of Systems:  Skin:  Negative     Eyes:  Negative    ENT:  Negative    Respiratory:  Negative    Cardiovascular:    dizziness;Positive for;lightheadedness  Gastroenterology: Negative    Genitourinary:  Negative    Musculoskeletal:  Negative    Neurologic:  Negative    Psychiatric:  Positive for sleep disturbances  Heme/Lymph/Imm:  Negative    Endocrine:  Negative      Physical Exam:  Vitals: /80 (BP Location: Right arm, Patient Position: Sitting, Cuff Size: Adult Regular)   Pulse 60   Ht 1.727 m (5' 8\")   Wt 66 kg (145 lb 8 oz)   Breastfeeding No   BMI 22.12 kg/m   "     Constitutional:  cooperative, alert and oriented, well developed, well nourished, in no acute distress   Thin and fit in appearance    Skin:  warm and dry to the touch        Head:  normocephalic        Eyes:  no xanthalasma        ENT:  no pallor or cyanosis        Neck:  carotid pulses are full and equal bilaterally, JVP normal, no carotid bruit        Chest:  normal breath sounds, clear to auscultation, normal A-P diameter, normal symmetry, normal respiratory excursion, no use of accessory muscles        Cardiac: regular rhythm, normal S1/S2, no S3 or S4, apical impulse not displaced, no murmurs, gallops or rubs                  Abdomen:  abdomen soft;BS normoactive        Vascular: pulses full and equal                                      Extremities and Back:  no edema        Neurological:  no gross motor deficits          Recent Lab Results:  LIPID RESULTS:  No results found for: CHOL, HDL, LDL, TRIG, CHOLHDLRATIO    LIVER ENZYME RESULTS:  Lab Results   Component Value Date    AST 23 09/26/2018    ALT 24 09/26/2018       CBC RESULTS:  Lab Results   Component Value Date    WBC 7.7 09/30/2018    RBC 4.18 09/30/2018    HGB 12.5 09/30/2018    HCT 38.0 09/30/2018    MCV 91 09/30/2018    MCH 29.9 09/30/2018    MCHC 32.9 09/30/2018    RDW 12.8 09/30/2018     09/30/2018       BMP RESULTS:  Lab Results   Component Value Date    .1 12/13/2019    POTASSIUM 4.51 12/13/2019    CHLORIDE 104.3 12/13/2019    CO2 25.9 12/13/2019    ANIONGAP 5 09/30/2018    GLC 96 12/13/2019    BUN 15 12/13/2019    BUN 16.0 12/13/2019    CR 0.94 12/13/2019    GFRESTIMATED >90 09/30/2018    GFRESTBLACK >90 09/30/2018    ELDER 9.6 12/13/2019        A1C RESULTS:  No results found for: A1C    INR RESULTS:  Lab Results   Component Value Date    INR 1.01 11/04/2012    INR 1.10 01/16/2005           Thank you for allowing me to participate in the care of your patient.    Sincerely,     David Wade MD     [facility]  [facility]

## 2019-12-31 NOTE — PROGRESS NOTES
HISTORY:    Ree Batres is a pleasant 44-year-old female who recently visited the dentist and had a blood pressure of 190 systolic.  It was rechecked with a second cuff and was identical and she was told to see her physician within a week.  She saw her primary care doctor and her blood pressure was once again elevated to an identical level.  A 24-hour ambulatory blood pressure monitor was ordered and a cardiology consult was arranged.    Ree has never had problems with hypertension.  She is physically very active, continuing to run marathons on a periodic basis.  Denies exertional chest, arm, neck, or jaw discomfort, symptoms of palpitations, PND/orthopnea, syncope or near syncope, strokelike symptoms, peripheral edema, or claudication.    Cardiac risk factors are negative.  She has not been diagnosed with hypertension in the past and has no history of smoking or diabetes.  She does have a family history of coronary artery disease but not in immediate family members, 1 of her grandfathers  in his early 60s and a grandmother  in her late 70s of CAD.  No immediate family members have had problems with CAD.    The 24-hour ambulatory blood pressure monitor was reviewed with Ree today.  It is completely normal showing normal nighttime and daytime blood pressures with normal nighttime dipping.      ASSESSMENT/PLAN:    1.  Suspected hypertension.  The blood pressure on 24-hour ambulatory monitoring is completely normal.  Her high blood pressure was undoubtedly secondary to stress.  She may have an exaggerated blood pressure response to stress, but this does not put her at increased risk of future problems.  In reviewing her chart and her cardiovascular situation I realized that she has never had a cholesterol checked, at least not for the last 15 years.  I have ordered a fasting lipid profile and will contact her with the results.  I encouraged her to continue her healthy lifestyle.    Thank you for  inviting me to participate in your patient's care.  No follow-up is planned other than review of her lipid profile when available.  Please do not hesitate to call if there are further questions or concerns.    Orders Placed This Encounter   Procedures     Lipid Profile     No orders of the defined types were placed in this encounter.    There are no discontinued medications.    10 year ASCVD risk: The ASCVD Risk score (Juarez SHARONA Patricio., et al., 2013) failed to calculate for the following reasons:    Cannot find a previous HDL lab    Cannot find a previous total cholesterol lab    Encounter Diagnosis   Name Primary?     Screening for cardiovascular condition        CURRENT MEDICATIONS:  No current outpatient medications on file.       ALLERGIES     Allergies   Allergen Reactions     Amoxicillin Hives     Codeine Sulfate Nausea and Vomiting     Penicillins Rash       PAST MEDICAL HISTORY:  Past Medical History:   Diagnosis Date     Basal cell carcinoma of skin      H pylori ulcer     resolved     Renal disease     kidney stone in past/kidney infections       PAST SURGICAL HISTORY:  Past Surgical History:   Procedure Laterality Date     APPENDECTOMY       CHOLECYSTECTOMY      may 2009     COLPOSCOPY, CONIZATION, COMBINED  10/19/2012    Procedure: COMBINED COLPOSCOPY, CONIZATION;  Loop Electrosurgical Excision Procedure, Colposcopy;  Surgeon: Anahi Huang MD;  Location: [facility] OR     KNEE SURGERY Left        FAMILY HISTORY:  Family History   Problem Relation Age of Onset     Hypertension Mother      Cancer Father 73        Esophagus cancer     Alzheimer Disease Father 80     Hypertension Father      Thyroid Disease Sister        SOCIAL HISTORY:  Social History     Socioeconomic History     Marital status:      Spouse name: None     Number of children: 1     Years of education: None     Highest education level: None   Occupational History     Employer: OTHER     Comment: [employer]   Social Needs     Financial  "resource strain: Not hard at all     Food insecurity:     Worry: Never true     Inability: Never true     Transportation needs:     Medical: No     Non-medical: No   Tobacco Use     Smoking status: Never Smoker     Smokeless tobacco: Never Used   Substance and Sexual Activity     Alcohol use: Yes     Alcohol/week: 0.8 standard drinks     Types: 1 Standard drinks or equivalent per week     Drug use: No     Sexual activity: Not Currently     Partners: Male   Lifestyle     Physical activity:     Days per week: None     Minutes per session: None     Stress: None   Relationships     Social connections:     Talks on phone: None     Gets together: None     Attends [employer] service: None     Active member of club or organization: None     Attends meetings of clubs or organizations: None     Relationship status: None     Intimate partner violence:     Fear of current or ex partner: None     Emotionally abused: None     Physically abused: None     Forced sexual activity: None   Other Topics Concern     Parent/sibling w/ CABG, MI or angioplasty before 65F 55M? Not Asked   Social History Narrative     None       Review of Systems:  Skin:  Negative     Eyes:  Negative    ENT:  Negative    Respiratory:  Negative    Cardiovascular:    dizziness;Positive for;lightheadedness  Gastroenterology: Negative    Genitourinary:  Negative    Musculoskeletal:  Negative    Neurologic:  Negative    Psychiatric:  Positive for sleep disturbances  Heme/Lymph/Imm:  Negative    Endocrine:  Negative      Physical Exam:  Vitals: /80 (BP Location: Right arm, Patient Position: Sitting, Cuff Size: Adult Regular)   Pulse 60   Ht 1.727 m (5' 8\")   Wt 66 kg (145 lb 8 oz)   Breastfeeding No   BMI 22.12 kg/m      Constitutional:  cooperative, alert and oriented, well developed, well nourished, in no acute distress   Thin and fit in appearance    Skin:  warm and dry to the touch        Head:  normocephalic        Eyes:  no xanthalasma        ENT:  " no pallor or cyanosis        Neck:  carotid pulses are full and equal bilaterally, JVP normal, no carotid bruit        Chest:  normal breath sounds, clear to auscultation, normal A-P diameter, normal symmetry, normal respiratory excursion, no use of accessory muscles        Cardiac: regular rhythm, normal S1/S2, no S3 or S4, apical impulse not displaced, no murmurs, gallops or rubs                  Abdomen:  abdomen soft;BS normoactive        Vascular: pulses full and equal                                      Extremities and Back:  no edema        Neurological:  no gross motor deficits          Recent Lab Results:  LIPID RESULTS:  No results found for: CHOL, HDL, LDL, TRIG, CHOLHDLRATIO    LIVER ENZYME RESULTS:  Lab Results   Component Value Date    AST 23 09/26/2018    ALT 24 09/26/2018       CBC RESULTS:  Lab Results   Component Value Date    WBC 7.7 09/30/2018    RBC 4.18 09/30/2018    HGB 12.5 09/30/2018    HCT 38.0 09/30/2018    MCV 91 09/30/2018    MCH 29.9 09/30/2018    MCHC 32.9 09/30/2018    RDW 12.8 09/30/2018     09/30/2018       BMP RESULTS:  Lab Results   Component Value Date    .1 12/13/2019    POTASSIUM 4.51 12/13/2019    CHLORIDE 104.3 12/13/2019    CO2 25.9 12/13/2019    ANIONGAP 5 09/30/2018    GLC 96 12/13/2019    BUN 15 12/13/2019    BUN 16.0 12/13/2019    CR 0.94 12/13/2019    GFRESTIMATED >90 09/30/2018    GFRESTBLACK >90 09/30/2018    ELDER 9.6 12/13/2019        A1C RESULTS:  No results found for: A1C    INR RESULTS:  Lab Results   Component Value Date    INR 1.01 11/04/2012    INR 1.10 01/16/2005         David Wade MD, FACC    CC  Katharina Lincoln MD  [address], [address]  [place] [address]

## 2020-01-02 NOTE — TELEPHONE ENCOUNTER
Patient did not return my call and was seen in clinic on 12-31-19.     Peter RN, BSN  [facility]

## 2020-01-09 DIAGNOSIS — Z13.6 SCREENING FOR CARDIOVASCULAR CONDITION: ICD-10-CM

## 2020-01-09 LAB
CHOLEST SERPL-MCNC: 168 MG/DL
HDLC SERPL-MCNC: 87 MG/DL
LDLC SERPL CALC-MCNC: 72 MG/DL
NONHDLC SERPL-MCNC: 81 MG/DL
TRIGL SERPL-MCNC: 45 MG/DL

## 2020-01-09 PROCEDURE — 36415 COLL VENOUS BLD VENIPUNCTURE: CPT | Performed by: INTERNAL MEDICINE

## 2020-01-09 PROCEDURE — 80061 LIPID PANEL: CPT | Performed by: INTERNAL MEDICINE

## 2020-01-31 ENCOUNTER — TRANSFERRED RECORDS (OUTPATIENT)
Dept: FAMILY MEDICINE | Facility: CLINIC | Age: 45
End: 2020-01-31

## 2020-03-15 ENCOUNTER — HEALTH MAINTENANCE LETTER (OUTPATIENT)
Age: 45
End: 2020-03-15

## 2020-09-17 ENCOUNTER — HOSPITAL ENCOUNTER (OUTPATIENT)
Dept: MAMMOGRAPHY | Facility: CLINIC | Age: 45
Discharge: HOME | End: 2020-09-17
Attending: OBSTETRICS & GYNECOLOGY | Admitting: OBSTETRICS & GYNECOLOGY
Payer: COMMERCIAL

## 2020-09-17 DIAGNOSIS — Z12.31 VISIT FOR SCREENING MAMMOGRAM: ICD-10-CM

## 2020-09-17 PROCEDURE — 77067 SCR MAMMO BI INCL CAD: CPT

## 2021-01-09 ENCOUNTER — HEALTH MAINTENANCE LETTER (OUTPATIENT)
Age: 46
End: 2021-01-09

## 2021-03-25 ENCOUNTER — APPOINTMENT (OUTPATIENT)
Dept: ULTRASOUND IMAGING | Facility: CLINIC | Age: 46
End: 2021-03-25
Attending: EMERGENCY MEDICINE
Payer: COMMERCIAL

## 2021-03-25 ENCOUNTER — HOSPITAL ENCOUNTER (EMERGENCY)
Facility: CLINIC | Age: 46
Discharge: HOME | End: 2021-03-25
Attending: EMERGENCY MEDICINE | Admitting: EMERGENCY MEDICINE
Payer: COMMERCIAL

## 2021-03-25 VITALS
OXYGEN SATURATION: 99 % | DIASTOLIC BLOOD PRESSURE: 87 MMHG | HEIGHT: 68 IN | WEIGHT: 146 LBS | HEART RATE: 59 BPM | TEMPERATURE: 98 F | RESPIRATION RATE: 20 BRPM | BODY MASS INDEX: 22.13 KG/M2 | SYSTOLIC BLOOD PRESSURE: 141 MMHG

## 2021-03-25 DIAGNOSIS — M79.662 PAIN OF LEFT CALF: ICD-10-CM

## 2021-03-25 PROCEDURE — 99284 EMERGENCY DEPT VISIT MOD MDM: CPT | Mod: 25

## 2021-03-25 PROCEDURE — 93971 EXTREMITY STUDY: CPT | Mod: LT

## 2021-03-25 ASSESSMENT — ENCOUNTER SYMPTOMS
WEAKNESS: 0
JOINT SWELLING: 0
NUMBNESS: 0

## 2021-03-25 ASSESSMENT — MIFFLIN-ST. JEOR: SCORE: 1360.75

## 2021-03-25 NOTE — ED TRIAGE NOTES
Left calf pain started around 9am today. Burning sensation. Denies hx of blood clots. Took ibuprofen.

## 2021-03-26 NOTE — ED PROVIDER NOTES
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  Patient was running on the treadmill today versus outside, which is her usual, and she noticed some left calf pain after running.  Over the day the pain is increased and feels like a burning dull discomfort.  She denies any fall, \"pop,\" pain to the knee or ankle, weakness, numbness, or other concerns.  As the pain continued this evening, she became concerned regarding the etiology of it presents for further evaluation.  She has no history of DVT or PE or other similar pain.      Review of Systems   Musculoskeletal: Negative for joint swelling.        Calf pain   Neurological: Negative for weakness and numbness.   All other systems reviewed and are negative.      Allergies:  Amoxicillin  Codeine Sulfate  Penicillins    Medications:  Patient denies use of current regular medications    Past Medical History:    Basal cell carcinoma   Renal disease  Hypertension      Past Surgical History:    Appendectomy   Choloecystectomy   Colposcopy   Knee surgery   Oophorectomy       Social History:  Patient presents to the ED alone.    Physical Exam     Patient Vitals for the past 24 hrs:   BP Temp Temp src Pulse Resp SpO2 Height Weight   03/25/21 1757 (!) 141/87 98  F (36.7  C) Temporal 59 20 99 % 1.727 m (5' 8\") 66.2 kg (146 lb)       Physical Exam  Constitutional: Alert, attentive  CV: 2+ DP and PT pulses, brisk distal cap refill  MSK: normal inspection to the lower leg.  No erythema or warmth.  Soft compartments throughout.  Mild tenderness to the inferior most aspect of the gastrocnemius without mass  Neurological: 5/5 strength to the DF, PF, EHL and FHL motor functions; sensation intact to the DP, SP, T, S and S distributions  Skin: Skin is warm and dry.      Emergency Department Course     Imaging:  US lower extremity venous duplex left:  1. No deep venous thrombosis " in the left lower extremity. Reading per radiology.    Emergency Department Course:    Reviewed:  nursing notes, vitals, past medical history and care everywhere    Assessments:    1919 Initial assessment     Consults:   None     Interventions:  None     Disposition:  The patient was discharged to home.     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  There is no pain out of proportion to exam or other findings to suggest compartment syndrome.  Ultrasound shows no evidence of DVT or other acute pathology.  No erythema or warmth to suggest cellulitis.  The inferior gastrocnemius is mildly tender, suggestive of possible strain.  Plantar and dorsiflexion are intact.  Plan supportive measures for probable muscle strain, orthopedic follow-up in 2 to 3 days if symptoms persist, and return precautions for severe pain, weakness, numbness, or any other concerns.      Diagnosis:    ICD-10-CM    1. Pain of left calf  M79.662        Scribe Disclosure:  I, Dusty Campbell, am serving as a scribe at 7:19 PM on 3/25/2021 to document services personally performed by James Matos MD based on my observations and the provider's statements to me.            Jmaes Matos MD  03/25/21 2021

## 2021-05-16 ENCOUNTER — HOSPITAL ENCOUNTER (EMERGENCY)
Facility: CLINIC | Age: 46
Discharge: HOME | End: 2021-05-16
Attending: EMERGENCY MEDICINE | Admitting: EMERGENCY MEDICINE
Payer: COMMERCIAL

## 2021-05-16 VITALS
TEMPERATURE: 97.7 F | SYSTOLIC BLOOD PRESSURE: 150 MMHG | RESPIRATION RATE: 20 BRPM | HEART RATE: 68 BPM | OXYGEN SATURATION: 97 % | DIASTOLIC BLOOD PRESSURE: 96 MMHG

## 2021-05-16 DIAGNOSIS — R19.7 BLOODY DIARRHEA: ICD-10-CM

## 2021-05-16 LAB
ABO + RH BLD: NORMAL
ABO + RH BLD: NORMAL
ALBUMIN SERPL-MCNC: 4.1 G/DL (ref 3.4–5)
ALP SERPL-CCNC: 62 U/L (ref 40–150)
ALT SERPL W P-5'-P-CCNC: 37 U/L (ref 0–50)
ANION GAP SERPL CALCULATED.3IONS-SCNC: 6 MMOL/L (ref 3–14)
AST SERPL W P-5'-P-CCNC: 35 U/L (ref 0–45)
BASOPHILS # BLD AUTO: 0 10E9/L (ref 0–0.2)
BASOPHILS NFR BLD AUTO: 0.3 %
BILIRUB SERPL-MCNC: 0.7 MG/DL (ref 0.2–1.3)
BLD GP AB SCN SERPL QL: NORMAL
BLOOD BANK CMNT PATIENT-IMP: NORMAL
BUN SERPL-MCNC: 19 MG/DL (ref 7–30)
C COLI+JEJUNI+LARI FUSA STL QL NAA+PROBE: NOT DETECTED
CALCIUM SERPL-MCNC: 8.9 MG/DL (ref 8.5–10.1)
CHLORIDE SERPL-SCNC: 108 MMOL/L (ref 94–109)
CO2 SERPL-SCNC: 25 MMOL/L (ref 20–32)
CREAT SERPL-MCNC: 0.64 MG/DL (ref 0.52–1.04)
DIFFERENTIAL METHOD BLD: ABNORMAL
EC STX1 GENE STL QL NAA+PROBE: NOT DETECTED
EC STX2 GENE STL QL NAA+PROBE: NOT DETECTED
ENTERIC PATHOGEN COMMENT: NORMAL
EOSINOPHIL # BLD AUTO: 0 10E9/L (ref 0–0.7)
EOSINOPHIL NFR BLD AUTO: 0 %
ERYTHROCYTE [DISTWIDTH] IN BLOOD BY AUTOMATED COUNT: 12 % (ref 10–15)
GFR SERPL CREATININE-BSD FRML MDRD: >90 ML/MIN/{1.73_M2}
GLUCOSE SERPL-MCNC: 77 MG/DL (ref 70–99)
HCT VFR BLD AUTO: 39.8 % (ref 35–47)
HEMOCCULT STL QL: POSITIVE
HGB BLD-MCNC: 13.3 G/DL (ref 11.7–15.7)
IMM GRANULOCYTES # BLD: 0 10E9/L (ref 0–0.4)
IMM GRANULOCYTES NFR BLD: 0.3 %
INR PPP: 1 (ref 0.86–1.14)
LACTATE BLD-SCNC: 0.9 MMOL/L (ref 0.7–2)
LYMPHOCYTES # BLD AUTO: 0.8 10E9/L (ref 0.8–5.3)
LYMPHOCYTES NFR BLD AUTO: 5.9 %
MCH RBC QN AUTO: 30.9 PG (ref 26.5–33)
MCHC RBC AUTO-ENTMCNC: 33.4 G/DL (ref 31.5–36.5)
MCV RBC AUTO: 93 FL (ref 78–100)
MONOCYTES # BLD AUTO: 1.2 10E9/L (ref 0–1.3)
MONOCYTES NFR BLD AUTO: 9.1 %
NEUTROPHILS # BLD AUTO: 11.5 10E9/L (ref 1.6–8.3)
NEUTROPHILS NFR BLD AUTO: 84.4 %
NOROV GI+II ORF1-ORF2 JNC STL QL NAA+PR: NOT DETECTED
NRBC # BLD AUTO: 0 10*3/UL
NRBC BLD AUTO-RTO: 0 /100
PLATELET # BLD AUTO: 220 10E9/L (ref 150–450)
POTASSIUM SERPL-SCNC: 3.8 MMOL/L (ref 3.4–5.3)
PROT SERPL-MCNC: 7.8 G/DL (ref 6.8–8.8)
RBC # BLD AUTO: 4.3 10E12/L (ref 3.8–5.2)
RVA NSP5 STL QL NAA+PROBE: NOT DETECTED
SALMONELLA SP RPOD STL QL NAA+PROBE: NOT DETECTED
SHIGELLA SP+EIEC IPAH STL QL NAA+PROBE: NOT DETECTED
SODIUM SERPL-SCNC: 139 MMOL/L (ref 133–144)
SPECIMEN EXP DATE BLD: NORMAL
V CHOL+PARA RFBL+TRKH+TNAA STL QL NAA+PR: NOT DETECTED
WBC # BLD AUTO: 13.6 10E9/L (ref 4–11)
Y ENTERO RECN STL QL NAA+PROBE: NOT DETECTED

## 2021-05-16 PROCEDURE — 86900 BLOOD TYPING SEROLOGIC ABO: CPT | Performed by: EMERGENCY MEDICINE

## 2021-05-16 PROCEDURE — 258N000003 HC RX IP 258 OP 636: Performed by: EMERGENCY MEDICINE

## 2021-05-16 PROCEDURE — 86901 BLOOD TYPING SEROLOGIC RH(D): CPT | Performed by: EMERGENCY MEDICINE

## 2021-05-16 PROCEDURE — 80053 COMPREHEN METABOLIC PANEL: CPT | Performed by: EMERGENCY MEDICINE

## 2021-05-16 PROCEDURE — 83605 ASSAY OF LACTIC ACID: CPT | Performed by: EMERGENCY MEDICINE

## 2021-05-16 PROCEDURE — 82272 OCCULT BLD FECES 1-3 TESTS: CPT | Performed by: EMERGENCY MEDICINE

## 2021-05-16 PROCEDURE — 99284 EMERGENCY DEPT VISIT MOD MDM: CPT | Mod: 25

## 2021-05-16 PROCEDURE — 86850 RBC ANTIBODY SCREEN: CPT | Performed by: EMERGENCY MEDICINE

## 2021-05-16 PROCEDURE — 85610 PROTHROMBIN TIME: CPT | Performed by: EMERGENCY MEDICINE

## 2021-05-16 PROCEDURE — 96360 HYDRATION IV INFUSION INIT: CPT

## 2021-05-16 PROCEDURE — 87506 IADNA-DNA/RNA PROBE TQ 6-11: CPT | Performed by: EMERGENCY MEDICINE

## 2021-05-16 PROCEDURE — 93005 ELECTROCARDIOGRAM TRACING: CPT

## 2021-05-16 PROCEDURE — 96361 HYDRATE IV INFUSION ADD-ON: CPT

## 2021-05-16 PROCEDURE — 85025 COMPLETE CBC W/AUTO DIFF WBC: CPT | Performed by: EMERGENCY MEDICINE

## 2021-05-16 RX ORDER — SODIUM CHLORIDE 9 MG/ML
INJECTION, SOLUTION INTRAVENOUS CONTINUOUS
Status: DISCONTINUED | OUTPATIENT
Start: 2021-05-16 | End: 2021-05-16 | Stop reason: HOSPADM

## 2021-05-16 RX ORDER — TRANEXAMIC ACID 100 MG/ML
INJECTION, SOLUTION INTRAVENOUS
Status: DISCONTINUED
Start: 2021-05-16 | End: 2021-05-16 | Stop reason: WASHOUT

## 2021-05-16 RX ORDER — LIDOCAINE HYDROCHLORIDE 40 MG/ML
SOLUTION TOPICAL
Status: DISCONTINUED
Start: 2021-05-16 | End: 2021-05-16 | Stop reason: HOSPADM

## 2021-05-16 RX ORDER — OXYMETAZOLINE HYDROCHLORIDE 0.05 G/100ML
SPRAY NASAL
Status: DISCONTINUED
Start: 2021-05-16 | End: 2021-05-16 | Stop reason: WASHOUT

## 2021-05-16 RX ADMIN — SODIUM CHLORIDE 1000 ML: 9 INJECTION, SOLUTION INTRAVENOUS at 10:07

## 2021-05-16 ASSESSMENT — ENCOUNTER SYMPTOMS
CHILLS: 1
ABDOMINAL PAIN: 1
SHORTNESS OF BREATH: 0
DIARRHEA: 1
FEVER: 0
NAUSEA: 1
VOMITING: 0
BLOOD IN STOOL: 1
COUGH: 0

## 2021-05-16 NOTE — DISCHARGE INSTRUCTIONS
1. -Take acetaminophen 500 to 1000 mg by mouth every 4 to 6 hours as needed for pain or fever.  Do not take more than 4000 mg in 24 hours.  Do not take within 6 hours of another acetaminophen containing medication such as norco (vicodin) or percocet.  2. Drink plenty of fluids such as diluted gatorade.  3. Do not use antidiarrheal medications until 3 to 4 days after symptoms have been present.  4. Please follow-up with your primary care doctor if your symptoms persist after you return home.  5. Please return to the ED as needed for new or worsening symptoms such as fainting, vomiting and unable to keep anything down, severe and uncontrollable pain, worsening bloody bowel movements or vomit, any other concerning symptoms.

## 2021-05-16 NOTE — ED PROVIDER NOTES
History   Chief Complaint:  Generalized Weakness and Rectal Bleeding     The history is provided by the patient.      Ree Batres is a 46 year old female with history of renal disease who presents with generalized weakness and rectal bleeding. The patient tells us that she woke up this morning with her regular plans to take a 10 mile run, however one and a half miles in the patient had to stop to have a bowel movement which is not typical for her. About one mile later, the patient had to stop again and have another bowel movent and this time noticed blood on the edges of the toilet. The patient had to stop two more times throughout the rest of her run to have a bowel movement and each time noticed more blood. Once she got home she got in the shower and felt extremely weak and lightheaded which prompted her to sit on the bench inside the shower; after she stood up she noticed that she had had another bloody bowel movement. This prompted the patient to call the nurses hotline who told her to come into the emergency department. In addition to her other symptoms, the patient is having mild lower back pain and episodes of chills. The patient denies chest pain, shortness of breath or rash.     To note, the patients mother has a history of rectal cancer.     Review of Systems   Constitutional: Positive for chills. Negative for fever.   Respiratory: Negative for cough and shortness of breath.    Cardiovascular: Negative for chest pain.   Gastrointestinal: Positive for abdominal pain, blood in stool, diarrhea and nausea. Negative for vomiting.   Skin: Negative for rash.   All other systems reviewed and are negative.    Allergies:  Amoxicillin   Codeine Sulfate  Penicillin     Medications:  The patient is not currently taking any prescribed medication.     Past Medical History:    Basal cell carcinoma of skin   H pylor ulcer  Renal disease    Past Surgical History:    Appendectomy  Cholecystectomy  Colposcopy  Knee  surgery  Oophorectomy     Family History:    Hypertension   Esophagus Cancer  Rectal Cancer  Thyroid disease    Social History:  The patient presents to the ED with her .   The patient denies tobacco use.   The patient endorses alcohol use.     Physical Exam     Patient Vitals for the past 24 hrs:   BP Temp Temp src Pulse Resp SpO2   21 0931 (!) 150/96 97.7  F (36.5  C) Temporal 80 18 100 %       Physical Exam  Constitutional: Well developed, forlorn, nontox appearance  Head: Atraumatic.   Neck:  no stridor  Eyes: no scleral icterus  Cardiovascular: RRR, 2+ bilat radial pulses  Pulmonary/Chest: nml resp effort, Clear BS bilat  Abdominal: ND, soft, NT, no rebound or guarding   Rectal: deferred  : no CVA tenderness bilat  Ext: Warm, well perfused, no edema  Neurological: A&O, symmetric facies, moves ext x4  Skin: Skin is warm and dry.   Psychiatric: Behavior is normal. Thought content normal.   Nursing note and vitals reviewed.    Emergency Department Course   ECG  ECG taken at 0946, ECG read at 0947  Normal sinus rhythm. Possible left atrial enlargement. RSR' or QR pattern in V1 suggests right ventricular conduction delay. Borderline ECG.   Rate 72 bpm. ID interval 176 ms. QRS duration 102 ms. QT/QTc 426/466 ms. P-R-T axes 33 24 24.     Laboratory:     CBC: WBC 13.6(H), HGB 13.3,     CMP: WNL (Creatinine 0.64)     INR - 1.00    Lactic acid (result time 0952) 0.9     ABO/Rh -  O positive    Stool Occult Blood - Positive     Enteric Bacteria and Virus Panel by [facility] Stool - Pending     Emergency Department Course:    Reviewed:  I reviewed nursing notes, vitals, past medical history and care everywhere    Assessments:  0947 I obtained history and examined the patient as noted above.   1205 I rechecked the patient and explained findings.     Disposition:  The patient was discharged to home.     Impression & Plan     Medical Decision Makin year old female presenting w/ diarrhea, rectal  bleeding     DDx includes colitis NOS, ischemic colitis, infectious colitis, inflammatory bowel disease, inflammatory colitis NOS, electrolyte abnormality, dehydration, anemia.  Doubt brisk upper GI bleed given history and physical exam, stable vital signs.  Labs significant for leukocytosis, + stool occult, nml Hgb.  Given no abdominal pain or tenderness on exam, acute advanced imaging of the patient's abdomen was deferred in the ED.  Interventions as noted above with mild improvement in symptoms.  Patient had a bowel movement in the emergency department which was watery and somewhat bloody.  She reports this is improved from her previous bloody bowel movements.  Given her reassuring vital signs and labs, I feel she is safe for discharge with plan for outpatient follow-up with gastroenterology with strict return precautions to the emergency department given.  Do not feel antibiotics are indicated at this time particularly w/ stool culture pending.  At this time I feel the pt is safe for discharge.  Recommendations given regarding follow up with PCP and return to the emergency department as needed for new or worsening symptoms.  Pt and  counseled on all results, disposition and diagnosis.  They are understanding and agreeable to plan. Patient discharged in stable condition.      Diagnosis:    ICD-10-CM    1. Bloody diarrhea  R19.7 Stool: occult blood     GASTROENTEROLOGY ADULT REF CONSULT ONLY     Discharge Medications:  There are no discharge medications for this patient.    Scribe Disclosure:  Kendell YANEZ, am serving as a scribe at 9:47 AM on 5/16/2021 to document services personally performed by Jaron Rachel MD, based on my observations and the provider's statements to me.        Jaron Rachel MD  05/16/21 3413

## 2021-05-16 NOTE — ED TRIAGE NOTES
Patient arrives with c/o rectal bleeding and uncontrolled bowel movements that started at 0530. Pt denies a history of this. Pt went for a run this morning 0530 and had to stop 3 times to use the bathroom, one of them was uncontrolled. Pt reports dizziness and feeling weak, denies any blood thinners. ABCs intact.   
Initial (On Arrival)

## 2021-05-17 LAB — INTERPRETATION ECG - MUSE: NORMAL

## 2021-09-21 ENCOUNTER — HOSPITAL ENCOUNTER (OUTPATIENT)
Dept: MAMMOGRAPHY | Facility: CLINIC | Age: 46
End: 2021-09-21
Attending: OBSTETRICS & GYNECOLOGY
Payer: COMMERCIAL

## 2021-09-21 ENCOUNTER — HOSPITAL ENCOUNTER (OUTPATIENT)
Dept: ULTRASOUND IMAGING | Facility: CLINIC | Age: 46
End: 2021-09-21
Attending: OBSTETRICS & GYNECOLOGY
Payer: COMMERCIAL

## 2021-09-21 DIAGNOSIS — N63.20 LEFT BREAST MASS: ICD-10-CM

## 2021-09-21 PROCEDURE — 76642 ULTRASOUND BREAST LIMITED: CPT | Mod: LT

## 2021-09-21 PROCEDURE — 77062 BREAST TOMOSYNTHESIS BI: CPT

## 2021-10-23 ENCOUNTER — HEALTH MAINTENANCE LETTER (OUTPATIENT)
Age: 46
End: 2021-10-23

## 2021-11-16 NOTE — MR AVS SNAPSHOT
After Visit Summary   10/3/2018    Ree Batres    MRN: [id number]           Patient Information     Date Of Birth          1975        Visit Information        Provider Department      10/3/2018 12:00 PM Katharina Lincoln MD [facility]        Today's Diagnoses     Pneumonia of left upper lobe due to infectious organism (H)    -  1    Cough          Care Instructions    probable viral etiology by history  Plan: Symptomatic care with decongestants, fluids, tylenol/advil prn. Sips of clear water and cough drops now back to work.    Recheck 10/30/2018 for a repeat CXR. RTC sooner prn problems.    In addition, I have suggested that the patient   monitor for symptoms of bacterial infection expecting slow gradual resolution of viral URI as the natural course.              Follow-ups after your visit        Follow-up notes from your care team     Return in about 4 weeks (around 10/31/2018), or if symptoms worsen or fail to improve.      Who to contact     If you have questions or need follow up information about today's clinic visit or your schedule please contact [facility] directly at [phone].  Normal or non-critical lab and imaging results will be communicated to you by [employer], letter or phone within 4 business days after the clinic has received the results. If you do not hear from us within 7 days, please contact the clinic through [employer] or phone. If you have a critical or abnormal lab result, we will notify you by phone as soon as possible.  Submit refill requests through [employer] or call your pharmacy and they will forward the refill request to us. Please allow 3 business days for your refill to be completed.          Additional Information About Your Visit        MyChart Information     [employer] gives you secure access to your electronic health record. If you see a primary care provider, you can also send messages to your care team and make  "appointments. If you have questions, please call your primary care clinic.  If you do not have a primary care provider, please call [phone] and they will assist you.        Care EveryWhere ID     This is your Care EveryWhere ID. This could be used by other organizations to access your [facility] medical records  [id number]        Your Vitals Were     Pulse Temperature Respirations Height Last Period Pulse Oximetry    56 97.7  F (36.5  C) (Oral) 16 1.702 m (5' 7\") 09/24/2018 97%    Breastfeeding? BMI (Body Mass Index)                No 22.55 kg/m2           Blood Pressure from Last 3 Encounters:   10/03/18 130/76   09/30/18 150/86   09/26/18 151/90    Weight from Last 3 Encounters:   10/03/18 65.3 kg (144 lb)   09/24/18 67.8 kg (149 lb 6.4 oz)   02/11/17 77.1 kg (170 lb)              Today, you had the following     No orders found for display         Today's Medication Changes          These changes are accurate as of 10/3/18  1:21 PM.  If you have any questions, ask your nurse or doctor.               These medicines have changed or have updated prescriptions.        Dose/Directions    * benzonatate 100 MG capsule   Commonly known as:  TESSALON   This may have changed:  Another medication with the same name was added. Make sure you understand how and when to take each.   Changed by:  Katharina Lincoln MD        Dose:  100 mg   Take 1 capsule (100 mg) by mouth 3 times daily as needed for cough   Quantity:  15 capsule   Refills:  0       * benzonatate 100 MG capsule   Commonly known as:  TESSALON   This may have changed:  You were already taking a medication with the same name, and this prescription was added. Make sure you understand how and when to take each.   Used for:  Cough   Changed by:  Katharina Lincoln MD        Dose:  100 mg   Take 1 capsule (100 mg) by mouth 3 times daily as needed for cough   Quantity:  21 capsule   Refills:  0       * Notice:  This list has 2 medication(s) that are the same as other " medications prescribed for you. Read the directions carefully, and ask your doctor or other care provider to review them with you.         Where to get your medicines      These medications were sent to [facility] - [facility] [address] [facility] [address] [address] [facility], [facility] [address]     Phone:  [phone]     benzonatate 100 MG capsule                Primary Care Provider Office Phone # Fax #    Teena Rasheeda Meraz PA-C [phone] [address]  [facility] [address]        Equal Access to Services     [facility]: Hadii aad ku hadasho Soomaali, waaxda luqadaha, qaybta kaalmada adeegyada, waxay nattyin hayshahramn ariel ventura . So [facility] [phone].    ATENCIÓN: Si habla español, tiene a espinal disposición servicios gratuitos de asistencia lingüística. [facility] [phone].    We comply with applicable federal civil rights laws and [place] laws. We do not discriminate on the basis of race, color, national origin, age, disability, sex, sexual orientation, or gender identity.            Thank you!     Thank you for choosing [facility]  for your care. Our goal is always to provide you with excellent care. Hearing back from our patients is one way we can continue to improve our services. Please take a few minutes to complete the written survey that you may receive in the mail after your visit with us. Thank you!             Your Updated Medication List - Protect others around you: Learn how to safely use, store and throw away your medicines at www.disposemymeds.org.          This list is accurate as of 10/3/18  1:21 PM.  Always use your most recent med list.                   Brand Name Dispense Instructions for use Diagnosis    * benzonatate 100 MG capsule    TESSALON    15 capsule    Take 1 capsule (100 mg) by mouth 3 times daily as needed for cough        * benzonatate 100 MG capsule    TESSALON    21  capsule    Take 1 capsule (100 mg) by mouth 3 times daily as needed for cough    Cough       cefdinir 300 MG capsule    OMNICEF    20 capsule    Take 1 capsule (300 mg) by mouth 2 times daily        levofloxacin 750 MG tablet    LEVAQUIN    7 tablet    Take 1 tablet (750 mg) by mouth daily        * Notice:  This list has 2 medication(s) that are the same as other medications prescribed for you. Read the directions carefully, and ask your doctor or other care provider to review them with you.       cancer/cardiovascular

## 2022-01-11 ENCOUNTER — OFFICE VISIT (OUTPATIENT)
Dept: DERMATOLOGY | Facility: CLINIC | Age: 47
End: 2022-01-11
Payer: COMMERCIAL

## 2022-01-11 DIAGNOSIS — L40.8 SEBOPSORIASIS: Primary | ICD-10-CM

## 2022-01-11 DIAGNOSIS — L30.9 DERMATITIS: ICD-10-CM

## 2022-01-11 PROCEDURE — 99204 OFFICE O/P NEW MOD 45 MIN: CPT | Performed by: DERMATOLOGY

## 2022-01-11 RX ORDER — TRIAMCINOLONE ACETONIDE 1 MG/G
CREAM TOPICAL 2 TIMES DAILY
Qty: 80 G | Refills: 3 | Status: SHIPPED | OUTPATIENT
Start: 2022-01-11 | End: 2023-11-03

## 2022-01-11 RX ORDER — KETOCONAZOLE 20 MG/ML
SHAMPOO TOPICAL DAILY PRN
Qty: 120 ML | Refills: 11 | Status: SHIPPED | OUTPATIENT
Start: 2022-01-11 | End: 2022-05-10

## 2022-01-11 RX ORDER — CLOBETASOL PROPIONATE 0.5 MG/ML
SOLUTION TOPICAL 2 TIMES DAILY
Qty: 50 ML | Refills: 11 | Status: SHIPPED | OUTPATIENT
Start: 2022-01-11 | End: 2022-05-10

## 2022-01-11 ASSESSMENT — PAIN SCALES - GENERAL: PAINLEVEL: NO PAIN (0)

## 2022-01-11 NOTE — LETTER
Date:January 27, 2022      Patient was self referred, no letter generated. Do not send.        [facility] Health Information

## 2022-01-11 NOTE — NURSING NOTE
Dermatology Rooming Note    Ree Batres's goals for this visit include:   Chief Complaint   Patient presents with     Derm Problem     Itchy scalp - has been using OTC nizoral with slight improvement.     Connie Meza, CMA

## 2022-01-11 NOTE — PROGRESS NOTES
[facility] Dermatology Note  Encounter Date: Jan 11, 2022  Office Visit     Dermatology Problem List:  1. History of BCC on right hand and left lower breast~1999  ____________________________________________    Assessment & Plan:    #. Seborrheic dermatitis with early lichen simplex chronicus.   Differential was initially seborrheic vs psoriatic dermatitis. I would not suspect itchiness to be to this degree in psoriatic dermatitis.  no associated bleeding with scale, and she does not have similar scaling in other regions of hair on face.  Suspect she had seborrheic picture given some improvement with ketoconazole 2%, but likely exacerbated from scratching.  - Ketoconazole 2% shampoo daily as needed  - Triamcinolone to scalp twice daily, taper by half once symptoms resolve.    # Eczematous dermatitis, right forearm.   - Kenalog 1% to affected area on forearm twice a day, daily, then taper by half.    Procedures Performed:   None    Follow-up: 4 months    Staff and Scribe:     Scribe Disclosure:  I, Latoya Marrero, am serving as a scribe to document services personally performed by Angelo Willard MD based on data collection and the provider's statements to me.     ILinda examined and staffed the patient with Dr. Willard.    Linda Still MS4    Staff Physician:  I was present with the medical student who participated in the service and in the documentation of the note. I have verified the history and personally performed the physical exam and medical decision making. I agree with the assessment and plan of care as documented in the note.     Angelo Willard MD  Staff Dermatologist and Dermatopathologist  , Department of Dermatology  ____________________________________________    CC: Derm Problem (Itchy scalp - has been using OTC nizoral with slight improvement.)    HPI:  Ms. Ree Batres is a(n) 46 year old female who presents today as a new patient for  "\"itchy scalp that is maddening\".    She first noticed the itchiness in April 2021, is primarily localized in the occipital scalp. She states it is constantly itchy, and she has to stop doing her tasks to itch. She also states she has an intermittently itchy spot on her forearm that scales but does not bleed.    Patient is otherwise feeling well, without additional skin concerns.    Labs Reviewed:  N/A    Physical Exam:  Vitals: There were no vitals taken for this visit.  SKIN: Focused examination of scalp, hand, and right forearm was performed.  There is macular erythema of the scalp with mild flaky white scale. There are linear streak patterning of her scales.  On patient's right forearm, there is a small ~2cm mild scaling plaque with no bleeding.   Fingernails with no apparent pitting.    - No other lesions of concern on areas examined.     Medications:  No current outpatient medications on file.     No current facility-administered medications for this visit.      Past Medical History:   Patient Active Problem List   Diagnosis     History of basal cell cancer     ACP (advance care planning)     Health Care Home     Elevated blood-pressure reading without diagnosis of hypertension     Past Medical History:   Diagnosis Date     Basal cell carcinoma of skin      H pylori ulcer     resolved     Renal disease     kidney stone in past/kidney infections        CC Referred Self, MD  No address on file on close of this encounter.    "

## 2022-01-11 NOTE — Clinical Note
"1/11/2022       RE: Ree Batres  [address] [facility] [address]     Dear Colleague,    Thank you for referring your patient, Ree Batres, to the [facility] [place] at [facility]. Please see a copy of my visit note below.    [facility] Dermatology Note  Encounter Date: Jan 11, 2022  {kkvisit3:156508}    Dermatology Problem List:  1. History of BCC on hand and left lower breast~1999      ____________________________________________    Assessment & Plan:    # {Diagnosesderm:374594}.   {kkplans:950646}   - ***     # {Diagnosesderm:872464}.   {kkplans:242981}   - ***     Procedures Performed:   {kkprocedurenotes:342418}  {kkprocedurenotes:813677}    Follow-up: {kkfollowup:254026}    Staff and Scribe:     Scribe Disclosure:  I, Latoya Marrero, am serving as a scribe to document services personally performed by Angelo Willard MD based on data collection and the provider's statements to me.       ***  ____________________________________________    CC: Derm Problem (Itchy scalp - has been using OTC nizoral with slight improvement.)    HPI:  Ms. Ree Batres is a(n) 46 year old female who presents today as a new patient for \"itchy scalp that is maddening\".    She first noticed the itchiness in April 2021, is primarily localized in the occipital scalp. She states it is constantly itchy, and she has to stop doing her tasks to itch.     Patient is otherwise feeling well, without additional skin concerns.    Labs Reviewed:  ***N/A    Physical Exam:  Vitals: There were no vitals taken for this visit.  SKIN: Focused examination of scalp was performed.  - ***  There is macular erythema of the scalp with mild flaky white scale..   - {Skin Exam Derm:957500}.   - {Skin Exam Derm:485209}.   - No other lesions of concern on areas examined.     Medications:  No current outpatient medications on file.     No current " facility-administered medications for this visit.      Past Medical History:   Patient Active Problem List   Diagnosis     History of basal cell cancer     ACP (advance care planning)     Health Care Home     Elevated blood-pressure reading without diagnosis of hypertension     Past Medical History:   Diagnosis Date     Basal cell carcinoma of skin      H pylori ulcer     resolved     Renal disease     kidney stone in past/kidney infections        CC Referred Self, MD  No address on file on close of this encounter.        Again, thank you for allowing me to participate in the care of your patient.      Sincerely,    Angelo Willard MD

## 2022-02-12 ENCOUNTER — HEALTH MAINTENANCE LETTER (OUTPATIENT)
Age: 47
End: 2022-02-12

## 2022-03-24 ENCOUNTER — HOSPITAL ENCOUNTER (EMERGENCY)
Facility: CLINIC | Age: 47
Discharge: LEFT BEFORE DISCHARGE | End: 2022-03-24
Admitting: EMERGENCY MEDICINE
Payer: COMMERCIAL

## 2022-03-24 VITALS
SYSTOLIC BLOOD PRESSURE: 164 MMHG | RESPIRATION RATE: 18 BRPM | BODY MASS INDEX: 21.52 KG/M2 | WEIGHT: 142 LBS | OXYGEN SATURATION: 94 % | HEIGHT: 68 IN | TEMPERATURE: 99.6 F | HEART RATE: 66 BPM | DIASTOLIC BLOOD PRESSURE: 106 MMHG

## 2022-03-24 LAB
ALBUMIN UR-MCNC: NEGATIVE MG/DL
APPEARANCE UR: CLEAR
BACTERIA #/AREA URNS HPF: ABNORMAL /HPF
BILIRUB UR QL STRIP: NEGATIVE
COLOR UR AUTO: ABNORMAL
GLUCOSE UR STRIP-MCNC: NEGATIVE MG/DL
HGB UR QL STRIP: NEGATIVE
KETONES UR STRIP-MCNC: NEGATIVE MG/DL
LEUKOCYTE ESTERASE UR QL STRIP: NEGATIVE
NITRATE UR QL: NEGATIVE
PH UR STRIP: 7 [PH] (ref 5–7)
RBC URINE: <1 /HPF
SP GR UR STRIP: 1.01 (ref 1–1.03)
SQUAMOUS EPITHELIAL: 1 /HPF
UROBILINOGEN UR STRIP-MCNC: NORMAL MG/DL
WBC URINE: <1 /HPF

## 2022-03-24 PROCEDURE — 999N000104 HC STATISTIC NO CHARGE

## 2022-03-24 PROCEDURE — 81001 URINALYSIS AUTO W/SCOPE: CPT | Performed by: EMERGENCY MEDICINE

## 2022-03-24 NOTE — ED TRIAGE NOTES
Here for right flank pain radiating to right groin area yesterday night. Today feel progressively worse with hot/cold feeling, blood in urine, nausea. Pain feels like kidney stone in 2014. ABCs intact.    Detail Level: Simple

## 2022-05-10 ENCOUNTER — OFFICE VISIT (OUTPATIENT)
Dept: DERMATOLOGY | Facility: CLINIC | Age: 47
End: 2022-05-10
Payer: COMMERCIAL

## 2022-05-10 DIAGNOSIS — L40.8 SEBOPSORIASIS: Primary | ICD-10-CM

## 2022-05-10 PROCEDURE — 99213 OFFICE O/P EST LOW 20 MIN: CPT | Performed by: DERMATOLOGY

## 2022-05-10 RX ORDER — KETOCONAZOLE 20 MG/ML
SHAMPOO TOPICAL DAILY PRN
Qty: 120 ML | Refills: 11 | Status: SHIPPED | OUTPATIENT
Start: 2022-05-10 | End: 2023-11-03

## 2022-05-10 RX ORDER — CLOBETASOL PROPIONATE 0.5 MG/ML
SOLUTION TOPICAL 2 TIMES DAILY
Qty: 50 ML | Refills: 11 | Status: SHIPPED | OUTPATIENT
Start: 2022-05-10 | End: 2023-07-18

## 2022-05-10 ASSESSMENT — PAIN SCALES - GENERAL: PAINLEVEL: NO PAIN (0)

## 2022-05-10 NOTE — Clinical Note
5/10/2022       RE: Ree Batres  [address] [facility] [address]     Dear Colleague,    Thank you for referring your patient, Ree Batres, to the [facility] [place] at [facility]. Please see a copy of my visit note below.    [facility] Dermatology Note  Encounter Date: May 10, 2022  {kkvisit3:030906}    Dermatology Problem List:  1. History of BCC on right hand and left lower breast~1999    ____________________________________________    Assessment & Plan:       #. Seborrheic dermatitis with early lichen simplex chronicus.   Differential was initially seborrheic vs psoriatic dermatitis. I would not suspect itchiness to be to this degree in psoriatic dermatitis.  no associated bleeding with scale, and she does not have similar scaling in other regions of hair on face.  Suspect she had seborrheic picture given some improvement with ketoconazole 2%, but likely exacerbated from scratching.  - Ketoconazole 2% shampoo daily as needed  - Triamcinolone to scalp twice daily, taper by half once symptoms resolve.     # Eczematous dermatitis, right forearm.   - Kenalog 1% to affected area on forearm twice a day, daily, then taper by half.    Procedures Performed:   {kkprocedurenotes:906550}  {kkprocedurenotes:281833}    Follow-up: {kkfollowup:245247}    Staff and Scribe:     Scribe Disclosure:  I, Latoya Marrero, am serving as a scribe to document services personally performed by Angelo Willard MD based on data collection and the provider's statements to me.     ***  ____________________________________________    CC: No chief complaint on file.    HPI:  Ms. Ree Batres is a(n) 47 year old female who presents today {kknew/return:775078} for ***. Last seen in dermatology on 1/11/22 by myself at which point he was advised to use ketoconazole 2% shampoo and TMC BID for treatment of seb derm. For eczematous  dermatitis, she was advised to use TMC.     Patient is otherwise feeling well, without additional skin concerns.    Labs Reviewed:  ***N/A    Physical Exam:  Vitals: LMP 03/17/2022   SKIN: {kkSkinExam:203005}  - ***  - {Skin Exam Derm:520336}.   - {Skin Exam Derm:700342}.   - {Skin Exam Derm:389545}.   - No other lesions of concern on areas examined.     Medications:  Current Outpatient Medications   Medication     clobetasol (TEMOVATE) 0.05 % external solution     ketoconazole (NIZORAL) 2 % external shampoo     triamcinolone (KENALOG) 0.1 % external cream     No current facility-administered medications for this visit.      Past Medical History:   Patient Active Problem List   Diagnosis     History of basal cell cancer     ACP (advance care planning)     Health Care Home     Elevated blood-pressure reading without diagnosis of hypertension     Past Medical History:   Diagnosis Date     Basal cell carcinoma of skin      H pylori ulcer     resolved     Renal disease     kidney stone in past/kidney infections        CC Referred Self, MD  No address on file on close of this encounter.        Again, thank you for allowing me to participate in the care of your patient.      Sincerely,    Angelo Willard MD

## 2022-05-10 NOTE — PROGRESS NOTES
[facility] Dermatology Note  Encounter Date: May 10, 2022  Office Visit     Dermatology Problem List:  1. History of BCC on right hand and left lower breast~1999  2. Seborrheic dermatitis   - ketoconazole 2% shampoo, clobetasol 0.05% ointment  3. Eczematous dermatitis   - TMC   ____________________________________________    Assessment & Plan:       #. Seborrheic dermatitis   Discussed natural etiology. Do not suspect psoriatic dermatitis given primary symptom being itch and not having any morning joint pains, nail changes, or other skin lesions.  Advised to treat aggressively when she develops symptoms. Steroid precautions discussed. However, there is no evidence of skin thinning on exam today.   - Continue Ketoconazole 2% shampoo daily as needed  - Continue using clobetasol 0.05% solution up to two times daily PRN. Take a week off after using consistently for a month.     # Eczematous dermatitis, right forearm. Resolved.   - Can use Kenalog 1% to affected area PRN.     Procedures Performed:   None    Follow-up: 1 year(s) in-person, or earlier for new or changing lesions    Staff and Scribe:     Scribe Disclosure:  I, Latoya Marrero, am serving as a scribe to document services personally performed by Angelo Willard MD based on data collection and the provider's statements to me.     Staff attestation:  The documentation recorded by the scribe accurately reflects the services I personally performed and the decisions I personally made. I have made edits where needed.    Angelo Willard MD  Staff Dermatologist and Dermatopathologist  , Department of Dermatology  ____________________________________________    CC: Derm Problem (Ree is here today for an itchy scalp. She states the itching comes and goes in waves, but itching has improved since last visit.)    HPI:  Ms. Ree Batres is a(n) 47 year old female who presents today as a return patient for follow-up. Last seen  in dermatology on 1/11/22 by myself at which point he was advised to use ketoconazole 2% shampoo and TMC BID for treatment of seb derm. For eczematous dermatitis, she was advised to use TMC.     Today, the patient reports that her scalp itching comes and goes. It seems resolve very quickly when using the ketoconazole shampoo and topical steroid liquid. However, when she tapers off the medications, it quickly returns. The longest she has gone without using the medications is about 2 weeks. Her scalp does seem to improve in the warm weather. Notably, she recently had to stop running due to joint pains in her hips, back, and ankles. She is now being followed by [facility] for this. No new nail ridges. Patient is otherwise feeling well, without additional skin concerns.    Labs Reviewed:  N/A    Physical Exam:  Vitals: LMP 03/17/2022   SKIN: Focused examination of the scalp was performed. Nails painted.   - There is macular erythema of the scalp with mild flaky white scale, improved from prior.   - No other lesions of concern on areas examined.     Medications:  Current Outpatient Medications   Medication     clobetasol (TEMOVATE) 0.05 % external solution     ketoconazole (NIZORAL) 2 % external shampoo     triamcinolone (KENALOG) 0.1 % external cream     No current facility-administered medications for this visit.      Past Medical History:   Patient Active Problem List   Diagnosis     History of basal cell cancer     ACP (advance care planning)     Health Care Home     Elevated blood-pressure reading without diagnosis of hypertension     Past Medical History:   Diagnosis Date     Basal cell carcinoma of skin      H pylori ulcer     resolved     Renal disease     kidney stone in past/kidney infections        CC Referred Self, MD  No address on file on close of this encounter.

## 2022-05-10 NOTE — LETTER
Date:May 26, 2022      Patient was self referred, no letter generated. Do not send.        [facility] Health Information

## 2022-05-10 NOTE — NURSING NOTE
Dermatology Rooming Note    Ree KAREN Medardo's goals for this visit include:   Chief Complaint   Patient presents with     Derm Problem     Ree is here today for an itchy scalp. She states the itching comes and goes in waves, but itching has improved since last visit.     Fabi Gilbert, Facilitator

## 2022-09-27 ENCOUNTER — HOSPITAL ENCOUNTER (OUTPATIENT)
Dept: MAMMOGRAPHY | Facility: CLINIC | Age: 47
Discharge: HOME | End: 2022-09-27
Attending: OBSTETRICS & GYNECOLOGY | Admitting: OBSTETRICS & GYNECOLOGY
Payer: COMMERCIAL

## 2022-09-27 DIAGNOSIS — Z12.31 VISIT FOR SCREENING MAMMOGRAM: ICD-10-CM

## 2022-09-27 PROCEDURE — 77067 SCR MAMMO BI INCL CAD: CPT

## 2022-10-09 ENCOUNTER — HEALTH MAINTENANCE LETTER (OUTPATIENT)
Age: 47
End: 2022-10-09

## 2023-03-25 ENCOUNTER — HEALTH MAINTENANCE LETTER (OUTPATIENT)
Age: 48
End: 2023-03-25

## 2023-03-31 ENCOUNTER — OFFICE VISIT (OUTPATIENT)
Dept: FAMILY MEDICINE | Facility: CLINIC | Age: 48
End: 2023-03-31

## 2023-03-31 VITALS
SYSTOLIC BLOOD PRESSURE: 136 MMHG | BODY MASS INDEX: 26.16 KG/M2 | DIASTOLIC BLOOD PRESSURE: 74 MMHG | HEIGHT: 68 IN | RESPIRATION RATE: 20 BRPM | WEIGHT: 172.6 LBS | HEART RATE: 60 BPM | TEMPERATURE: 97.8 F

## 2023-03-31 DIAGNOSIS — Z01.818 PRE-OP EXAM: Primary | ICD-10-CM

## 2023-03-31 DIAGNOSIS — H02.403 PTOSIS OF BOTH EYELIDS: ICD-10-CM

## 2023-03-31 LAB
HEMOGLOBIN: 14.1 G/DL (ref 11.7–15.7)
PREG. TEST: NORMAL

## 2023-03-31 PROCEDURE — 90715 TDAP VACCINE 7 YRS/> IM: CPT | Performed by: PHYSICIAN ASSISTANT

## 2023-03-31 PROCEDURE — 85018 HEMOGLOBIN: CPT | Performed by: PHYSICIAN ASSISTANT

## 2023-03-31 PROCEDURE — 90471 IMMUNIZATION ADMIN: CPT | Performed by: PHYSICIAN ASSISTANT

## 2023-03-31 PROCEDURE — 99204 OFFICE O/P NEW MOD 45 MIN: CPT | Mod: 25 | Performed by: PHYSICIAN ASSISTANT

## 2023-03-31 PROCEDURE — 81025 URINE PREGNANCY TEST: CPT | Performed by: PHYSICIAN ASSISTANT

## 2023-03-31 NOTE — NURSING NOTE
Ree Batres is here for a pre-op exam.    Questioned patient about current smoking habits.  Pt. has never smoked.  PULSE regular  My Chart: active  CLASSIFICATION OF OVERWEIGHT AND OBESITY BY BMI                        Obesity Class           BMI(kg/m2)  Underweight                                    < 18.5  Normal                                         18.5-24.9  Overweight                                     25.0-29.9  OBESITY                     I                  30.0-34.9                             II                 35.0-39.9  EXTREME OBESITY             III                >40                            Patient's  BMI Body mass index is 26.24 kg/m .  http://hin.nhlbi.nih.gov/menuplanner/menu.cgi  Pre-visit planning  Immunizations - Tdap today    The patient has verbalized that it is ok to leave a detailed voice message on the patient's cell phone with results/recommendations from this visit.

## 2023-03-31 NOTE — PROGRESS NOTES
[facility]  [address] [place] [address]  [address]  [facility] [address]  Phone: [phone]  Fax: [phone]  Primary Provider: Sebastian Malhotra  Pre-op Performing Provider: SEBASTIAN MALHOTRA      PREOPERATIVE EVALUATION:  Today's date: 3/31/2023    Ree Batres is a 47 year old female who presents for a preoperative evaluation.  No flowsheet data found.  Surgical Information:  Surgery/Procedure: eyelids  Surgery Location: [place]  Surgeon: Dr Goncalves  Surgery Date: 4/11/23  Time of Surgery: am  Where patient plans to recover: At home with family  Fax number for surgical facility: [phone]    Assessment & Plan     The proposed surgical procedure is considered LOW risk.    Pre-op exam  Proceed with surgery at surgeon's discretion.    - HEMOGLOBIN (BFP)  - Urine Pregnancy Test (BFP)    Ptosis of both eyelids          Risks and Recommendations:  The patient has the following additional risks and recommendations for perioperative complications:   - No identified additional risk factors other than previously addressed    Medication Instructions:  Patient is on no chronic medications    RECOMMENDATION:  APPROVAL GIVEN to proceed with proposed procedure, without further diagnostic evaluation.      Subjective     HPI related to upcoming procedure: Eyelid drooping LEON, affecting vision    1. No - Have you ever had a heart attack or stroke?  2. No - Have you ever had surgery on your heart or blood vessels, such as a stent, coronary (heart) bypass, or surgery on an artery in the head, neck, heart, or legs?  3. No - Do you have chest pain when you are physically active?  4. No - Do you have a history of heart failure?  5. No - Do you currently have a cold, bronchitis, or symptoms of other respiratory (head and chest) infections?  6. No - Do you have a cough, shortness of breath, or wheezing?  7. No - Do you or anyone in your family have a history of blood clots?  8. No - Do you or anyone in your family have a serious  bleeding problem, such as long-lasting bleeding after surgeries or cuts?  9. No - Have you ever had anemia or been told to take iron pills?  10. No - Have you had any abnormal blood loss such as black, tarry or bloody stools, or abnormal vaginal bleeding?  11. No - Have you ever had a blood transfusion?  12. Yes - Are you willing to have a blood transfusion if it is medically needed before, during, or after your surgery?  13. No - Have you or anyone in your family ever had problems with anesthesia (sedation for surgery)?  14. No - Do you have sleep apnea, excessive snoring, or daytime drowsiness?   15. No - Do you have any artifical heart valves or other implanted medical devices, such as a pacemaker, defibrillator, or continuous glucose monitor?  16. No - Do you have any artifical joints?  17. No - Are you allergic to latex?  18. No - Is there any chance that you may be pregnant?      Health Care Directive:  Patient does not have a Health Care Directive or Living Will: Discussed advance care planning with patient; however, patient declined at this time.    Preoperative Review of :   reviewed - no record of controlled substances prescribed.      Status of Chronic Conditions:  See problem list for active medical problems.  Problems all longstanding and stable, except as noted/documented.  See ROS for pertinent symptoms related to these conditions.      Review of Systems  CONSTITUTIONAL: NEGATIVE for fever, chills, change in weight  INTEGUMENTARY/SKIN: NEGATIVE for worrisome rashes, moles or lesions  ENT/MOUTH: NEGATIVE for ear, mouth and throat problems  RESP: NEGATIVE for significant cough or SOB  CV: NEGATIVE for chest pain, palpitations or peripheral edema  GI: NEGATIVE for nausea, abdominal pain, heartburn, or change in bowel habits  : NEGATIVE for frequency, dysuria, or hematuria  MUSCULOSKELETAL: NEGATIVE for significant arthralgias or myalgia  NEURO: NEGATIVE for weakness, dizziness or  "paresthesias  ENDOCRINE: NEGATIVE for temperature intolerance, skin/hair changes  HEME: NEGATIVE for bleeding problems  PSYCHIATRIC: NEGATIVE for changes in mood or affect    Patient Active Problem List    Diagnosis Date Noted     Elevated blood-pressure reading without diagnosis of hypertension 12/13/2019     Priority: Medium     History of basal cell cancer 07/15/2015     Priority: Medium     Health Care Home 02/08/2017     Priority: Low     ACP (advance care planning) 12/27/2016     Priority: Low      Past Medical History:   Diagnosis Date     Basal cell carcinoma of skin      H pylori ulcer     resolved     Renal disease     kidney stone in past/kidney infections     Past Surgical History:   Procedure Laterality Date     APPENDECTOMY       CHOLECYSTECTOMY      may 2009     COLPOSCOPY, CONIZATION, COMBINED  10/19/2012    Procedure: COMBINED COLPOSCOPY, CONIZATION;  Loop Electrosurgical Excision Procedure, Colposcopy;  Surgeon: Anahi Huang MD;  Location: [facility] OR     KNEE SURGERY Left      Current Outpatient Medications   Medication Sig Dispense Refill     clobetasol (TEMOVATE) 0.05 % external solution Apply topically 2 times daily 50 mL 11     ketoconazole (NIZORAL) 2 % external shampoo Apply topically daily as needed for itching or irritation 120 mL 11     triamcinolone (KENALOG) 0.1 % external cream Apply topically 2 times daily 80 g 3       Allergies   Allergen Reactions     Amoxicillin Hives     Codeine Sulfate Nausea and Vomiting     Penicillins Rash        Social History     Tobacco Use     Smoking status: Never     Smokeless tobacco: Never   Substance Use Topics     Alcohol use: Yes     Alcohol/week: 0.8 standard drinks     Types: 1 Standard drinks or equivalent per week       History   Drug Use No         Objective     /74 (BP Location: Right arm, Patient Position: Chair, Cuff Size: Adult Regular)   Pulse 60   Temp 97.8  F (36.6  C) (Temporal)   Resp 20   Ht 1.727 m (5' 8\")   Wt 78.3 kg (172 " lb 9.6 oz)   BMI 26.24 kg/m      Physical Exam    GENERAL APPEARANCE: healthy, alert and no distress     EYES: EOMI, PERRL     HENT: ear canals and TM's normal and nose and mouth without ulcers or lesions     NECK: no adenopathy, no asymmetry, masses, or scars and thyroid normal to palpation     RESP: lungs clear to auscultation - no rales, rhonchi or wheezes     CV: regular rates and rhythm, normal S1 S2, no S3 or S4 and no murmur, click or rub     ABDOMEN:  soft, nontender, no HSM or masses and bowel sounds normal     MS: extremities normal- no gross deformities noted, no evidence of inflammation in joints, FROM in all extremities.     SKIN: no suspicious lesions or rashes     NEURO: Normal strength and tone, sensory exam grossly normal, mentation intact and speech normal     PSYCH: mentation appears normal. and affect normal/bright     LYMPHATICS: No cervical adenopathy    Recent Labs   Lab Test 05/16/21  0952   HGB 13.3      INR 1.00      POTASSIUM 3.8   CR 0.64        Diagnostics:  Recent Results (from the past 24 hour(s))   HEMOGLOBIN (BFP)    Collection Time: 03/31/23 12:00 AM   Result Value Ref Range    Hemoglobin 14.1 11.7 - 15.7 g/dL   Urine Pregnancy Test (BFP)    Collection Time: 03/31/23 12:00 AM   Result Value Ref Range    Pregnancy Test neg       No EKG required for low risk surgery (cataract, skin procedure, breast biopsy, etc).    Revised Cardiac Risk Index (RCRI):  The patient has the following serious cardiovascular risks for perioperative complications:   - No serious cardiac risks = 0 points     RCRI Interpretation: 0 points: Class I (very low risk - 0.4% complication rate)         Signed Electronically by: Sebastian Malhotra PA-C  Copy of this evaluation report is provided to requesting physician.

## 2023-04-04 ENCOUNTER — OFFICE VISIT (OUTPATIENT)
Dept: DERMATOLOGY | Facility: CLINIC | Age: 48
End: 2023-04-04
Payer: COMMERCIAL

## 2023-04-04 DIAGNOSIS — D22.9 MULTIPLE BENIGN NEVI: Primary | ICD-10-CM

## 2023-04-04 DIAGNOSIS — R21 RASH: ICD-10-CM

## 2023-04-04 DIAGNOSIS — D49.2 NEOPLASM OF SKIN: ICD-10-CM

## 2023-04-04 PROCEDURE — 88341 IMHCHEM/IMCYTCHM EA ADD ANTB: CPT | Mod: TC | Performed by: DERMATOLOGY

## 2023-04-04 PROCEDURE — 88342 IMHCHEM/IMCYTCHM 1ST ANTB: CPT | Mod: 26 | Performed by: DERMATOLOGY

## 2023-04-04 PROCEDURE — 11103 TANGNTL BX SKIN EA SEP/ADDL: CPT | Performed by: DERMATOLOGY

## 2023-04-04 PROCEDURE — 11104 PUNCH BX SKIN SINGLE LESION: CPT | Performed by: DERMATOLOGY

## 2023-04-04 PROCEDURE — 88341 IMHCHEM/IMCYTCHM EA ADD ANTB: CPT | Mod: 26 | Performed by: DERMATOLOGY

## 2023-04-04 PROCEDURE — 88305 TISSUE EXAM BY PATHOLOGIST: CPT | Mod: 26 | Performed by: DERMATOLOGY

## 2023-04-04 PROCEDURE — 88313 SPECIAL STAINS GROUP 2: CPT | Mod: 26 | Performed by: DERMATOLOGY

## 2023-04-04 PROCEDURE — 88312 SPECIAL STAINS GROUP 1: CPT | Mod: 26 | Performed by: DERMATOLOGY

## 2023-04-04 PROCEDURE — 99213 OFFICE O/P EST LOW 20 MIN: CPT | Mod: 25 | Performed by: DERMATOLOGY

## 2023-04-04 ASSESSMENT — PAIN SCALES - GENERAL: PAINLEVEL: NO PAIN (0)

## 2023-04-04 NOTE — LETTER
4/4/2023       RE: Ree Batres  [address]  [facility] [address]     Dear Colleague,    Thank you for referring your patient, Ree Batres, to the [facility] [place] at [facility]. Please see a copy of my visit note below.    [facility] Dermatology Note  Encounter Date: Apr 4, 2023  Office Visit     Dermatology Problem List:  # History of BCC on right hand and left lower breast~1999  # Scaly plaque on occipital scalp  - Ddx psoriasis v seborrheic dermatitis v eczematous derm  - Punch bx 4/4/2023  - ketoconazole 2% shampoo, clobetasol 0.05% ointment  # Eczematous dermatitis   - TMC 0.1% ointment    Soc hx: works as an , has 8 year old son.  ____________________________________________    Assessment & Plan:     # Scaly plaque on occipital scalp - chronic, active.  She overall responds fairly well to topicals but less well controlled in winter months. Clinically could fit psoriasis but given concurrent prior eczematous derm, son with sensitive skin, also consider eczematous derm/ACD. Discussed bx for clarification, particularly as if more eczematous derm, would consider patch testing and she is interested in this today.  - Ddx psoriasis v seborrheic dermatitis v eczematous derm  - Punch bx today  - Continue ketoconazole shampoo 3x weekly  - Continue clobetasol ointment BID prn    NUBs:  - R paraspinal mid back, s/p shave bx 4/4/2023 - ddx DN, r/o melanoma  - L paraspinal mid back, s/p shave bx 4/4/2023 - ddx DN, r/o melanoma  - L inguinal fold, s/p shave bx 4/4/2023 - ddx symptomatic nevus    # Multiple benign nevi.   - Monitor for ABCDEs of melanoma   - Continue sun protection - recommend SPF 30 or higher with frequent application   - Return sooner if noticing changing or symptomatic lesions    Procedures Performed:   - Shave biopsy procedure note, location(s): see above. After discussion of  benefits and risks including but not limited to bleeding, infection, scar, incomplete removal, recurrence, and non-diagnostic biopsy, written consent and photographs were obtained. The area was cleaned with isopropyl alcohol. 0.5mL of 1% lidocaine with epinephrine was injected to obtain adequate anesthesia of lesion(s). Shave biopsy at site(s) performed. Hemostasis was achieved with aluminium chloride. Petrolatum ointment and a sterile dressing were applied. The patient tolerated the procedure and no complications were noted. The patient was provided with verbal and written post care instructions.   - Punch biopsy procedure note, location(s): see above. After discussion of benefits and risks including but not limited to bleeding, infection, scar, incomplete removal, recurrence, and non-diagnostic biopsy, written consent and photographs were obtained. The area was cleaned with isopropyl alcohol. 0.5mL of 1% lidocaine with epinephrine was injected to obtain adequate anesthesia and a 4 mm punch biopsy was performed at site(s). 4-0 Prolene sutures were utilized to approximate the epidermal edges. White petrolatum ointment and a bandage was applied to the wound. Explicit verbal and written wound care instructions were provided. The patient left the dermatology clinic in good condition.     Follow-up: 1 year, sooner pending plan based on path above.    Staff:     Azul Rendon MD    Department of Dermatology  [facility]: Phone: [phone], Fax: [phone]  4/4/2023    ____________________________________________    CC: Skin Check (Ree is here today for a skin check/itchy dry scalp)    HPI:  Ms. Ree Batres is a(n) 47 year old female who presents today as a return patient for  skin check and spot on scalp.    Has spot on back of scalp.  Uses ketoconazole shampoo, clobetasol solution.  Heat and humidity help. Salt  water helps.  Doesn't do too well in winter but better in summer.  Get a little rash on top of right 2nd finger.  Some on arm in past and TMC helped quickly.    Did have rashes on ears when younger.  No asthma, no allergies.  Mom has really bad skin on hands.    No product sensitivities.  Son has super sensitive skin.    No joint pains.    Patient is otherwise feeling well, without additional skin concerns.     Labs Reviewed:  N/A    Physical Exam:  Vitals: There were no vitals taken for this visit.  SKIN: Full skin, which includes the head/face, both arms, chest, back, abdomen,both legs, genitalia and/or groin buttocks, digits and/or nails, was examined.  - occipital scalp well-demarcated pink scaly plaque  - irregular brown macules right paraspinal mid back and left paraspinal lower back.  - fleshy papule left inguinal fold  - Multiple regular brown pigmented macules and papules are identified on the trunk and extremities. .   - Scattered brown macules on sun exposed areas.  - No other lesions of concern on areas examined.     Medications:  Current Outpatient Medications   Medication    clobetasol (TEMOVATE) 0.05 % external solution    ketoconazole (NIZORAL) 2 % external shampoo    triamcinolone (KENALOG) 0.1 % external cream     No current facility-administered medications for this visit.      Past Medical History:   Patient Active Problem List   Diagnosis    History of basal cell cancer    ACP (advance care planning)    Health Care Home    Elevated blood-pressure reading without diagnosis of hypertension     Past Medical History:   Diagnosis Date    Basal cell carcinoma of skin     H pylori ulcer     resolved    Renal disease     kidney stone in past/kidney infections     CC No referring provider defined for this encounter. on close of this encounter.

## 2023-04-04 NOTE — PROGRESS NOTES
[facility] Dermatology Note  Encounter Date: Apr 4, 2023  Office Visit     Dermatology Problem List:  # History of BCC on right hand and left lower breast~1999  # Scaly plaque on occipital scalp  - Ddx psoriasis v seborrheic dermatitis v eczematous derm  - Punch bx 4/4/2023  - ketoconazole 2% shampoo, clobetasol 0.05% ointment  # Eczematous dermatitis   - TMC 0.1% ointment    Soc hx: works as an , has 8 year old son.  ____________________________________________    Assessment & Plan:     # Scaly plaque on occipital scalp - chronic, active.  She overall responds fairly well to topicals but less well controlled in winter months. Clinically could fit psoriasis but given concurrent prior eczematous derm, son with sensitive skin, also consider eczematous derm/ACD. Discussed bx for clarification, particularly as if more eczematous derm, would consider patch testing and she is interested in this today.  - Ddx psoriasis v seborrheic dermatitis v eczematous derm  - Punch bx today  - Continue ketoconazole shampoo 3x weekly  - Continue clobetasol ointment BID prn    NUBs:  - R paraspinal mid back, s/p shave bx 4/4/2023 - ddx DN, r/o melanoma  - L paraspinal mid back, s/p shave bx 4/4/2023 - ddx DN, r/o melanoma  - L inguinal fold, s/p shave bx 4/4/2023 - ddx symptomatic nevus    # Multiple benign nevi.   - Monitor for ABCDEs of melanoma   - Continue sun protection - recommend SPF 30 or higher with frequent application   - Return sooner if noticing changing or symptomatic lesions      Procedures Performed:   - Shave biopsy procedure note, location(s): see above. After discussion of benefits and risks including but not limited to bleeding, infection, scar, incomplete removal, recurrence, and non-diagnostic biopsy, written consent and photographs were obtained. The area was cleaned with isopropyl alcohol. 0.5mL of 1% lidocaine with epinephrine was injected to obtain adequate anesthesia of lesion(s).  Shave biopsy at site(s) performed. Hemostasis was achieved with aluminium chloride. Petrolatum ointment and a sterile dressing were applied. The patient tolerated the procedure and no complications were noted. The patient was provided with verbal and written post care instructions.   - Punch biopsy procedure note, location(s): see above. After discussion of benefits and risks including but not limited to bleeding, infection, scar, incomplete removal, recurrence, and non-diagnostic biopsy, written consent and photographs were obtained. The area was cleaned with isopropyl alcohol. 0.5mL of 1% lidocaine with epinephrine was injected to obtain adequate anesthesia and a 4 mm punch biopsy was performed at site(s). 4-0 Prolene sutures were utilized to approximate the epidermal edges. White petrolatum ointment and a bandage was applied to the wound. Explicit verbal and written wound care instructions were provided. The patient left the dermatology clinic in good condition.     Follow-up: 1 year, sooner pending plan based on path above.    Staff:     Azul Rendon MD    Department of Dermatology  [facility]: Phone: [phone], Fax: [phone]  4/4/2023    ____________________________________________    CC: Skin Check (Ree is here today for a skin check/itchy dry scalp)    HPI:  Ms. Ree Batres is a(n) 47 year old female who presents today as a return patient for  skin check and spot on scalp.    Has spot on back of scalp.  Uses ketoconazole shampoo, clobetasol solution.  Heat and humidity help. Salt water helps.  Doesn't do too well in winter but better in summer.  Get a little rash on top of right 2nd finger.  Some on arm in past and TMC helped quickly.    Did have rashes on ears when younger.  No asthma, no allergies.  Mom has really bad skin on hands.    No product sensitivities.  Son has super sensitive skin.    No  joint pains.    Patient is otherwise feeling well, without additional skin concerns.     Labs Reviewed:  N/A    Physical Exam:  Vitals: There were no vitals taken for this visit.  SKIN: Full skin, which includes the head/face, both arms, chest, back, abdomen,both legs, genitalia and/or groin buttocks, digits and/or nails, was examined.  - occipital scalp well-demarcated pink scaly plaque  - irregular brown macules right paraspinal mid back and left paraspinal lower back.  - fleshy papule left inguinal fold  - Multiple regular brown pigmented macules and papules are identified on the trunk and extremities. .   - Scattered brown macules on sun exposed areas.  - No other lesions of concern on areas examined.     Medications:  Current Outpatient Medications   Medication     clobetasol (TEMOVATE) 0.05 % external solution     ketoconazole (NIZORAL) 2 % external shampoo     triamcinolone (KENALOG) 0.1 % external cream     No current facility-administered medications for this visit.      Past Medical History:   Patient Active Problem List   Diagnosis     History of basal cell cancer     ACP (advance care planning)     Health Care Home     Elevated blood-pressure reading without diagnosis of hypertension     Past Medical History:   Diagnosis Date     Basal cell carcinoma of skin      H pylori ulcer     resolved     Renal disease     kidney stone in past/kidney infections       CC No referring provider defined for this encounter. on close of this encounter.

## 2023-04-04 NOTE — PROGRESS NOTES
[facility] Dermatology Note  Encounter Date: Apr 4, 2023  Office Visit     Dermatology Problem List:  1. ***    ____________________________________________    Assessment & Plan:     # ***.   {kkplans:040281}   - ***     # ***.   {kkplans:577299}   - ***     Procedures Performed:   {kkprocedurenotes:195396}  {kkprocedurenotes:761576}    Follow-up: {kkfollowup:818889}    {kkstaffinvolved:280382}    ***  ____________________________________________    CC: Skin Check (Ree is here today for a skin check/itchy dry scalp)    HPI:  Ms. Ree Batres is a(n) 47 year old female who presents today as a return patient for skin check and spot on scalp.    Has spot on back of scalp.  Uses ketoconazole shampoo, clobetasol solution.  Heat and humidity help. Salt water helps.  Doesn't do too well in winter but better in summer.  Get a little rash on top of right 2nd finger.  Some on arm in past and TMC helped quickly.    Did have rashes on ears when younger.  No asthma, no allergies.  Mom has really bad skin on hands.    No product sensitivities.  Son has super sensitive skin.    Patient is otherwise feeling well, without additional skin concerns.     Labs Reviewed:  ***N/A    Physical Exam:  Vitals: There were no vitals taken for this visit.  SKIN: {kkSkinExam:154703}  - ***  - {Skin Exam Derm:179343}.   - {Skin Exam Derm:630159}.   - {Skin Exam Derm:736102}.   - No other lesions of concern on areas examined.     Medications:  Current Outpatient Medications   Medication     clobetasol (TEMOVATE) 0.05 % external solution     ketoconazole (NIZORAL) 2 % external shampoo     triamcinolone (KENALOG) 0.1 % external cream     No current facility-administered medications for this visit.      Past Medical History:   Patient Active Problem List   Diagnosis     History of basal cell cancer     ACP (advance care planning)     Health Care Home     Elevated blood-pressure reading without diagnosis of hypertension      Past Medical History:   Diagnosis Date     Basal cell carcinoma of skin      H pylori ulcer     resolved     Renal disease     kidney stone in past/kidney infections       CC No referring provider defined for this encounter. on close of this encounter.

## 2023-04-04 NOTE — NURSING NOTE
Dermatology Rooming Note    Ree Batres's goals for this visit include:   Chief Complaint   Patient presents with     Skin Check     Ree is here today for a skin check/itchy dry scalp     Ellen Villela RN

## 2023-04-04 NOTE — NURSING NOTE
Lidocaine-epinephrine 1-1:488868 % injection   4mL once for one use, starting 4/4/2023 ending 4/4/2023,  2mL disp, R-0, injection  Injected by Ellen Villela RN

## 2023-04-06 LAB
PATH REPORT.COMMENTS IMP SPEC: ABNORMAL
PATH REPORT.COMMENTS IMP SPEC: YES
PATH REPORT.FINAL DX SPEC: ABNORMAL
PATH REPORT.GROSS SPEC: ABNORMAL
PATH REPORT.MICROSCOPIC SPEC OTHER STN: ABNORMAL
PATH REPORT.RELEVANT HX SPEC: ABNORMAL

## 2023-04-07 ENCOUNTER — MYC MEDICAL ADVICE (OUTPATIENT)
Dept: DERMATOLOGY | Facility: CLINIC | Age: 48
End: 2023-04-07
Payer: COMMERCIAL

## 2023-04-07 DIAGNOSIS — D03.59 MELANOMA IN SITU OF BACK (H): Primary | ICD-10-CM

## 2023-04-10 ENCOUNTER — TELEPHONE (OUTPATIENT)
Dept: DERMATOLOGY | Facility: CLINIC | Age: 48
End: 2023-04-10
Payer: COMMERCIAL

## 2023-04-10 NOTE — TELEPHONE ENCOUNTER
Left patient a voicemail to schedule a consult & mohs for Melanoma left paraspinal back with Dr. Byrne. Provided my direct phone number.    Ermelinda Fay on 4/10/2023 at 11:21 AM

## 2023-04-10 NOTE — TELEPHONE ENCOUNTER
Ree called back and we discussed some options for the consultation and Melanoma Mohs.     The first opening at the [facility] for Melanoma Mohs is 05/15. Pt has a marathon on her calendar for 05/27. Please advise what the restrictions are as far as running after surgery, if pt should plan to cancel her marathon plans, it pt should have mohs after the marathon, etc.     Pt considers the removal of the skin cancer to be the priority.    Pt prefers a phone call with whatever updates we have for her.     Ermelinda Fay, Procedure  4/10/2023 2:53 PM

## 2023-04-11 NOTE — TELEPHONE ENCOUNTER
I called and notified pt of the below message. Pt verbally understood and is happy to hear that she can keep her plans as they are.     Ermelinda Fay, Procedure  4/11/2023 2:12 PM

## 2023-04-11 NOTE — TELEPHONE ENCOUNTER
"Pt called in to follow up on questions from yesterday. Pt understood that I am waiting for Dr. Byrne or Dr. Pacheco to provide guidance.     Pt also messaged:   \"I have yet to have my surgery scheduled.  Yesterday someone called to schedule it, but I had some questions regarding recovery.  My , son and I have a biking, hiking and kayaking trip planned in [place] on June 4. They offered surgery on May 15.  I wanted to make sure I would be able to go on the trip.  The  said they would look into it and I never heard back.  While I'm assured of what you tell me about the prognosis, I also want this taken care of in a relatively quick fashion.  Can you do anything to move this along?\"    Thanks!  Ermelinda Fay, Procedure  4/11/2023 1:50 PM      "

## 2023-04-26 NOTE — TELEPHONE ENCOUNTER
FUTURE VISIT INFORMATION      FUTURE VISIT INFORMATION:    Date: 5.8.23    Time: 3:15    Location: [facility]  REFERRAL INFORMATION:    Referring provider:  Julieta    Referring providers clinic:  Derm    Reason for visit/diagnosis  Melanoma in situ of back    RECORDS REQUESTED FROM:       Clinic name Comments Records Status Imaging Status   Derm 4.4.23  Path # [id number] Epic Epic

## 2023-05-08 ENCOUNTER — PRE VISIT (OUTPATIENT)
Dept: DERMATOLOGY | Facility: CLINIC | Age: 48
End: 2023-05-08

## 2023-05-08 ENCOUNTER — OFFICE VISIT (OUTPATIENT)
Dept: DERMATOLOGY | Facility: CLINIC | Age: 48
End: 2023-05-08
Payer: COMMERCIAL

## 2023-05-08 DIAGNOSIS — D03.59 MELANOMA IN SITU OF SKIN OF TRUNK (H): Primary | ICD-10-CM

## 2023-05-08 PROCEDURE — 99213 OFFICE O/P EST LOW 20 MIN: CPT | Mod: GC | Performed by: DERMATOLOGY

## 2023-05-08 ASSESSMENT — PAIN SCALES - GENERAL: PAINLEVEL: NO PAIN (0)

## 2023-05-08 NOTE — NURSING NOTE
Chief Complaint   Patient presents with     mohs consult     MIS on back.      Raeann MENDOSA CMA

## 2023-05-08 NOTE — PROGRESS NOTES
Dermatologic Surgery Consult Note    May 8, 2023    Dermatology Problem List:  1. History of BCC on right hand and left lower breast~1999  2. Psoriasiform dermatitis occipital scalp, s/p Punch bx 4/4/2023  - ketoconazole 2% shampoo, clobetasol 0.05% ointment  3. Eczematous dermatitis   - TMC 0.1% ointment  4. Benign bx:   - R paraspinal mid back, s/p shave bx 4/4/2023  - L inguinal fold, s/p shave bx 4/4/2023   5. MIS, L paraspinal lower back, s/p shave bx 4/4/2023    Subjective: The patient is a 48 year old woman who presents today for Mohs micrographic surgery consultation for a recent diagnosis of skin cancer.    Skin cancer(s): Melanoma in situ  Location(s):  L paraspinal lower back  Associated symptoms: none  Onset: within last 1 year    No other associated symptoms, modifying factors, or prior treatments, except when noted above. The patient denies any constitutional symptoms, lymphadenopathy, unintentional weight loss or decreased appetite. No other skin concerns today.    Objective:   Gen: This is a well appearing individual in no acute distress. The patient is alert and oriented x 3.  An exam of the back was performed today and visualized the following:  - 0.8 cm depressed pink biopsy scar on L paraspinal lower back    Assessment and Plan:     1. Plan for excision surgery for skin cancer(s) above:  - We discussed the nature of the diagnosis/condition above. We discussed the treatment options, including the risks benefits and expectations of these options. We recommend WLE as the most effective and most tissue sparing option for treatment, and the patient agrees to proceed with this.  The patient is aware of the risks, benefits and expectations of this procedure. The patient will be scheduled for this procedure, if not already done so.  - We anticipate the following closure type: Linear closure, such as complex linear closure (CLC)    The patient was discussed with and evaluated by attending physician, Souleymane  MD Caty.    Kendell Pacheco MD  Dermatology, PGY-5  Mohs surgery fellow    Scribe Disclosure:  I, JUAN JOSÉ VORA, am serving as a scribe to document services personally performed by Souleymane Byrne MD based on data collection and the provider's statements to me.     Attending Attestation  I attest that the Fellow recorded the interview and exam that I personally performed.  I have reviewed the note and edited it as necessary.    Souleymane Byrne M.D.  Professor  Director of Dermatologic Surgery  Department of Dermatology  [facility]
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
## 2023-05-13 ENCOUNTER — HOSPITAL ENCOUNTER (EMERGENCY)
Facility: CLINIC | Age: 48
Discharge: HOME | End: 2023-05-13
Attending: EMERGENCY MEDICINE | Admitting: EMERGENCY MEDICINE
Payer: COMMERCIAL

## 2023-05-13 VITALS
DIASTOLIC BLOOD PRESSURE: 95 MMHG | SYSTOLIC BLOOD PRESSURE: 168 MMHG | RESPIRATION RATE: 18 BRPM | HEART RATE: 70 BPM | TEMPERATURE: 97.2 F | OXYGEN SATURATION: 99 %

## 2023-05-13 DIAGNOSIS — T24.202A BURN OF LEFT LEG, SECOND DEGREE, INITIAL ENCOUNTER: ICD-10-CM

## 2023-05-13 DIAGNOSIS — T24.201A BURN OF RIGHT LEG, SECOND DEGREE, INITIAL ENCOUNTER: ICD-10-CM

## 2023-05-13 PROCEDURE — 99284 EMERGENCY DEPT VISIT MOD MDM: CPT

## 2023-05-13 PROCEDURE — 250N000013 HC RX MED GY IP 250 OP 250 PS 637: Performed by: EMERGENCY MEDICINE

## 2023-05-13 RX ORDER — CEPHALEXIN 500 MG/1
500 CAPSULE ORAL 4 TIMES DAILY
Qty: 28 CAPSULE | Refills: 0 | Status: SHIPPED | OUTPATIENT
Start: 2023-05-13 | End: 2023-10-13

## 2023-05-13 RX ORDER — OXYCODONE HYDROCHLORIDE 5 MG/1
5 TABLET ORAL EVERY 6 HOURS PRN
Qty: 6 EACH | Refills: 0 | Status: SHIPPED | OUTPATIENT
Start: 2023-05-13 | End: 2023-05-13

## 2023-05-13 RX ORDER — CEPHALEXIN 500 MG/1
500 CAPSULE ORAL 4 TIMES DAILY
Qty: 28 EACH | Refills: 0 | Status: SHIPPED | OUTPATIENT
Start: 2023-05-13 | End: 2023-05-13

## 2023-05-13 RX ORDER — OXYCODONE HYDROCHLORIDE 5 MG/1
5 TABLET ORAL EVERY 6 HOURS PRN
Qty: 6 TABLET | Refills: 0 | Status: SHIPPED | OUTPATIENT
Start: 2023-05-13 | End: 2023-10-13

## 2023-05-13 RX ORDER — OXYCODONE HYDROCHLORIDE 5 MG/1
5 TABLET ORAL ONCE
Status: COMPLETED | OUTPATIENT
Start: 2023-05-13 | End: 2023-05-13

## 2023-05-13 RX ORDER — GINSENG 100 MG
CAPSULE ORAL 2 TIMES DAILY
Qty: 453.9 G | Refills: 0 | Status: SHIPPED | OUTPATIENT
Start: 2023-05-13 | End: 2023-05-13

## 2023-05-13 RX ORDER — GINSENG 100 MG
CAPSULE ORAL 2 TIMES DAILY
Qty: 453.9 G | Refills: 0 | Status: SHIPPED | OUTPATIENT
Start: 2023-05-13 | End: 2023-10-13

## 2023-05-13 RX ADMIN — OXYCODONE HYDROCHLORIDE 5 MG: 5 TABLET ORAL at 16:50

## 2023-05-13 ASSESSMENT — ACTIVITIES OF DAILY LIVING (ADL): ADLS_ACUITY_SCORE: 33
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
## 2023-05-14 ENCOUNTER — DOCUMENTATION ONLY (OUTPATIENT)
Dept: DERMATOLOGY | Facility: CLINIC | Age: 48
End: 2023-05-14
Payer: COMMERCIAL

## 2023-05-14 ENCOUNTER — HOSPITAL ENCOUNTER (EMERGENCY)
Facility: CLINIC | Age: 48
Discharge: HOME | End: 2023-05-14
Attending: EMERGENCY MEDICINE | Admitting: EMERGENCY MEDICINE
Payer: COMMERCIAL

## 2023-05-14 VITALS
RESPIRATION RATE: 18 BRPM | TEMPERATURE: 97.6 F | OXYGEN SATURATION: 96 % | SYSTOLIC BLOOD PRESSURE: 152 MMHG | HEART RATE: 69 BPM | DIASTOLIC BLOOD PRESSURE: 115 MMHG

## 2023-05-14 DIAGNOSIS — T24.002D: ICD-10-CM

## 2023-05-14 PROCEDURE — 99283 EMERGENCY DEPT VISIT LOW MDM: CPT

## 2023-05-14 PROCEDURE — 250N000013 HC RX MED GY IP 250 OP 250 PS 637: Performed by: EMERGENCY MEDICINE

## 2023-05-14 RX ORDER — AMOXICILLIN 250 MG
1 CAPSULE ORAL DAILY
Qty: 20 TABLET | Refills: 0 | Status: SHIPPED | OUTPATIENT
Start: 2023-05-14 | End: 2023-10-13

## 2023-05-14 RX ORDER — HYDROMORPHONE HYDROCHLORIDE 2 MG/1
2 TABLET ORAL ONCE
Status: COMPLETED | OUTPATIENT
Start: 2023-05-14 | End: 2023-05-14

## 2023-05-14 RX ORDER — HYDROMORPHONE HYDROCHLORIDE 2 MG/1
2 TABLET ORAL EVERY 6 HOURS PRN
Qty: 10 TABLET | Refills: 0 | Status: SHIPPED | OUTPATIENT
Start: 2023-05-14 | End: 2023-05-17

## 2023-05-14 RX ORDER — IBUPROFEN 600 MG/1
600 TABLET, FILM COATED ORAL ONCE
Status: COMPLETED | OUTPATIENT
Start: 2023-05-14 | End: 2023-05-14

## 2023-05-14 RX ADMIN — HYDROMORPHONE HYDROCHLORIDE 2 MG: 2 TABLET ORAL at 07:56

## 2023-05-14 ASSESSMENT — ACTIVITIES OF DAILY LIVING (ADL): ADLS_ACUITY_SCORE: 33
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
## 2023-05-23 NOTE — TELEPHONE ENCOUNTER
Pt called back to reschedule mohs for melanoma on left paraspinal back.     Called patient to schedule surgery with Dr. Byrne    Date of Surgery: 06/22    Surgery type: melanoma mohs    Consult scheduled: Yes    Has patient had mohs with us before? No    Additional comments: would prefer the week of 06/05 if possible.      Ermelinda Fay on 5/23/2023 at 9:48 AM

## 2023-06-08 ENCOUNTER — APPOINTMENT (OUTPATIENT)
Dept: CT IMAGING | Facility: CLINIC | Age: 48
End: 2023-06-08
Attending: EMERGENCY MEDICINE
Payer: COMMERCIAL

## 2023-06-08 ENCOUNTER — HOSPITAL ENCOUNTER (EMERGENCY)
Facility: CLINIC | Age: 48
Discharge: HOME | End: 2023-06-08
Attending: EMERGENCY MEDICINE | Admitting: EMERGENCY MEDICINE
Payer: COMMERCIAL

## 2023-06-08 VITALS
RESPIRATION RATE: 14 BRPM | HEIGHT: 68 IN | WEIGHT: 160 LBS | TEMPERATURE: 97 F | HEART RATE: 64 BPM | OXYGEN SATURATION: 96 % | SYSTOLIC BLOOD PRESSURE: 131 MMHG | DIASTOLIC BLOOD PRESSURE: 70 MMHG | BODY MASS INDEX: 24.25 KG/M2

## 2023-06-08 DIAGNOSIS — A08.4 VIRAL GASTROENTERITIS: ICD-10-CM

## 2023-06-08 DIAGNOSIS — R55 SYNCOPE AND COLLAPSE: ICD-10-CM

## 2023-06-08 DIAGNOSIS — S00.03XA CONTUSION OF FACE, SCALP AND NECK, INITIAL ENCOUNTER: ICD-10-CM

## 2023-06-08 DIAGNOSIS — S10.93XA CONTUSION OF FACE, SCALP AND NECK, INITIAL ENCOUNTER: ICD-10-CM

## 2023-06-08 DIAGNOSIS — S00.83XA CONTUSION OF FACE, SCALP AND NECK, INITIAL ENCOUNTER: ICD-10-CM

## 2023-06-08 LAB
ALBUMIN SERPL BCG-MCNC: 4.1 G/DL (ref 3.5–5.2)
ALP SERPL-CCNC: 48 U/L (ref 35–104)
ALT SERPL W P-5'-P-CCNC: 15 U/L (ref 10–35)
ANION GAP SERPL CALCULATED.3IONS-SCNC: 11 MMOL/L (ref 7–15)
AST SERPL W P-5'-P-CCNC: 24 U/L (ref 10–35)
ATRIAL RATE - MUSE: 72 BPM
BASOPHILS # BLD AUTO: 0 10E3/UL (ref 0–0.2)
BASOPHILS NFR BLD AUTO: 0 %
BILIRUB SERPL-MCNC: 1 MG/DL
BUN SERPL-MCNC: 14.3 MG/DL (ref 6–20)
CALCIUM SERPL-MCNC: 8.7 MG/DL (ref 8.6–10)
CHLORIDE SERPL-SCNC: 99 MMOL/L (ref 98–107)
CREAT SERPL-MCNC: 0.6 MG/DL (ref 0.51–0.95)
DEPRECATED HCO3 PLAS-SCNC: 23 MMOL/L (ref 22–29)
DIASTOLIC BLOOD PRESSURE - MUSE: NORMAL MMHG
EOSINOPHIL # BLD AUTO: 0 10E3/UL (ref 0–0.7)
EOSINOPHIL NFR BLD AUTO: 1 %
ERYTHROCYTE [DISTWIDTH] IN BLOOD BY AUTOMATED COUNT: 12.1 % (ref 10–15)
GFR SERPL CREATININE-BSD FRML MDRD: >90 ML/MIN/1.73M2
GLUCOSE SERPL-MCNC: 108 MG/DL (ref 70–99)
HCG SERPL QL: NEGATIVE
HCT VFR BLD AUTO: 39.5 % (ref 35–47)
HGB BLD-MCNC: 13.7 G/DL (ref 11.7–15.7)
HOLD SPECIMEN: NORMAL
IMM GRANULOCYTES # BLD: 0 10E3/UL
IMM GRANULOCYTES NFR BLD: 0 %
INTERPRETATION ECG - MUSE: NORMAL
LYMPHOCYTES # BLD AUTO: 0.8 10E3/UL (ref 0.8–5.3)
LYMPHOCYTES NFR BLD AUTO: 11 %
MAGNESIUM SERPL-MCNC: 2 MG/DL (ref 1.7–2.3)
MCH RBC QN AUTO: 30.6 PG (ref 26.5–33)
MCHC RBC AUTO-ENTMCNC: 34.7 G/DL (ref 31.5–36.5)
MCV RBC AUTO: 88 FL (ref 78–100)
MONOCYTES # BLD AUTO: 0.5 10E3/UL (ref 0–1.3)
MONOCYTES NFR BLD AUTO: 7 %
NEUTROPHILS # BLD AUTO: 5.8 10E3/UL (ref 1.6–8.3)
NEUTROPHILS NFR BLD AUTO: 81 %
NRBC # BLD AUTO: 0 10E3/UL
NRBC BLD AUTO-RTO: 0 /100
P AXIS - MUSE: 32 DEGREES
PLATELET # BLD AUTO: 233 10E3/UL (ref 150–450)
POTASSIUM SERPL-SCNC: 3.6 MMOL/L (ref 3.4–5.3)
PR INTERVAL - MUSE: 160 MS
PROT SERPL-MCNC: 7.1 G/DL (ref 6.4–8.3)
QRS DURATION - MUSE: 104 MS
QT - MUSE: 420 MS
QTC - MUSE: 459 MS
R AXIS - MUSE: 19 DEGREES
RBC # BLD AUTO: 4.47 10E6/UL (ref 3.8–5.2)
SODIUM SERPL-SCNC: 133 MMOL/L (ref 136–145)
SYSTOLIC BLOOD PRESSURE - MUSE: NORMAL MMHG
T AXIS - MUSE: 23 DEGREES
TROPONIN T SERPL HS-MCNC: <6 NG/L
VENTRICULAR RATE- MUSE: 72 BPM
WBC # BLD AUTO: 7.2 10E3/UL (ref 4–11)

## 2023-06-08 PROCEDURE — 85014 HEMATOCRIT: CPT | Performed by: EMERGENCY MEDICINE

## 2023-06-08 PROCEDURE — 84703 CHORIONIC GONADOTROPIN ASSAY: CPT | Performed by: EMERGENCY MEDICINE

## 2023-06-08 PROCEDURE — 93005 ELECTROCARDIOGRAM TRACING: CPT

## 2023-06-08 PROCEDURE — 83735 ASSAY OF MAGNESIUM: CPT | Performed by: EMERGENCY MEDICINE

## 2023-06-08 PROCEDURE — 36415 COLL VENOUS BLD VENIPUNCTURE: CPT | Performed by: EMERGENCY MEDICINE

## 2023-06-08 PROCEDURE — 96374 THER/PROPH/DIAG INJ IV PUSH: CPT

## 2023-06-08 PROCEDURE — 80053 COMPREHEN METABOLIC PANEL: CPT | Performed by: EMERGENCY MEDICINE

## 2023-06-08 PROCEDURE — 250N000011 HC RX IP 250 OP 636: Performed by: EMERGENCY MEDICINE

## 2023-06-08 PROCEDURE — 96375 TX/PRO/DX INJ NEW DRUG ADDON: CPT

## 2023-06-08 PROCEDURE — 70450 CT HEAD/BRAIN W/O DYE: CPT

## 2023-06-08 PROCEDURE — 96361 HYDRATE IV INFUSION ADD-ON: CPT

## 2023-06-08 PROCEDURE — 72125 CT NECK SPINE W/O DYE: CPT

## 2023-06-08 PROCEDURE — 84484 ASSAY OF TROPONIN QUANT: CPT | Performed by: EMERGENCY MEDICINE

## 2023-06-08 PROCEDURE — 99285 EMERGENCY DEPT VISIT HI MDM: CPT | Mod: 25

## 2023-06-08 PROCEDURE — 258N000003 HC RX IP 258 OP 636: Performed by: EMERGENCY MEDICINE

## 2023-06-08 RX ORDER — METOCLOPRAMIDE 10 MG/1
10 TABLET ORAL 3 TIMES DAILY PRN
Qty: 15 TABLET | Refills: 0 | Status: SHIPPED | OUTPATIENT
Start: 2023-06-08 | End: 2023-10-13

## 2023-06-08 RX ORDER — SODIUM CHLORIDE 9 MG/ML
INJECTION, SOLUTION INTRAVENOUS CONTINUOUS
Status: DISCONTINUED | OUTPATIENT
Start: 2023-06-08 | End: 2023-06-08 | Stop reason: HOSPADM

## 2023-06-08 RX ORDER — METOCLOPRAMIDE HYDROCHLORIDE 5 MG/ML
10 INJECTION INTRAMUSCULAR; INTRAVENOUS ONCE
Status: COMPLETED | OUTPATIENT
Start: 2023-06-08 | End: 2023-06-08

## 2023-06-08 RX ORDER — HYDROMORPHONE HYDROCHLORIDE 1 MG/ML
0.5 INJECTION, SOLUTION INTRAMUSCULAR; INTRAVENOUS; SUBCUTANEOUS
Status: DISCONTINUED | OUTPATIENT
Start: 2023-06-08 | End: 2023-06-08 | Stop reason: HOSPADM

## 2023-06-08 RX ADMIN — METOCLOPRAMIDE HYDROCHLORIDE 10 MG: 5 INJECTION INTRAMUSCULAR; INTRAVENOUS at 08:36

## 2023-06-08 RX ADMIN — SODIUM CHLORIDE 1000 ML: 9 INJECTION, SOLUTION INTRAVENOUS at 08:36

## 2023-06-08 RX ADMIN — HYDROMORPHONE HYDROCHLORIDE 0.5 MG: 1 INJECTION, SOLUTION INTRAMUSCULAR; INTRAVENOUS; SUBCUTANEOUS at 10:06

## 2023-06-08 ASSESSMENT — ACTIVITIES OF DAILY LIVING (ADL)
ADLS_ACUITY_SCORE: 35
ADLS_ACUITY_SCORE: 35

## 2023-06-08 NOTE — DISCHARGE INSTRUCTIONS
Push fluids and rest  Reglan as needed for nausea  Tylenol and ice to pain  Discharge Instructions  Head Injury    You have been seen today for a head injury. Your evaluation included a history and physical examination. You may have had a CT (CAT) scan performed, though most head injuries do not require a scan. Based on this evaluation, your provider today does not feel that your head injury is serious.    Generally, every Emergency Department visit should have a follow-up clinic visit with either a primary or a specialty clinic/provider. Please follow-up as instructed by your emergency provider today.  Return to the Emergency Department if:  You are confused or you are not acting right.  Your headache gets worse or you start to have a really bad headache even with your recommended treatment plan.  You vomit (throw up) more than once.  You have a seizure.  You have trouble walking.  You have weakness or paralysis (cannot move) in an arm or a leg.  You have blood or fluid coming from your ears or nose.  You have new symptoms or anything that worries you.    Sleeping:  It is okay for you to sleep, but someone should wake you up if instructed by your provider, and someone should check on you at your usual time to wake up.     Activity:  Do not drive for at least 24 hours.  Do not drive if you have dizzy spells or trouble concentrating, or remembering things.  Do not return to any contact sports until cleared by your regular provider.     MORE INFORMATION:    Concussion:  A concussion is a minor head injury that may cause temporary problems with the way the brain works. Although concussions are important, they are generally not an emergency or a reason that a person needs to be hospitalized. Some concussion symptoms include confusion, amnesia (forgetful), nausea (sick to your stomach) and vomiting (throwing up), dizziness, fatigue, memory or concentration problems, irritability and sleep problems. For most people,  concussions are mild and temporary but some will have more severe and persistent symptoms that require on-going care and treatment.  CT Scans: Your evaluation today may have included a CT scan (CAT scan) to look for things like bleeding or a skull fracture (broken bone).  CT scans involve radiation and too many CT scans can cause serious health problems like cancer, especially in children.  Because of this, your provider may not have ordered a CT scan today if they think you are at low risk for a serious or life threatening problem.    If you were given a prescription for medicine here today, be sure to read all of the information (including the package insert) that comes with your prescription.  This will include important information about the medicine, its side effects, and any warnings that you need to know about.  The pharmacist who fills the prescription can provide more information and answer questions you may have about the medicine.  If you have questions or concerns that the pharmacist cannot address, please call or return to the Emergency Department.     Remember that you can always come back to the Emergency Department if you are not able to see your regular provider in the amount of time listed above, if you get any new symptoms, or if there is anything that worries you.

## 2023-06-08 NOTE — ED NOTES
"Patient has returned from CT.  Headache persists, rated 6/10.  Nausea improved, but continues to have a feeling of abdominal fullness.   She reports her dizziness is better, but she did not a feeling of \"room spinning\" while on the CT table.  "
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
## 2023-07-11 DIAGNOSIS — L40.8 SEBOPSORIASIS: ICD-10-CM

## 2023-07-12 ENCOUNTER — OFFICE VISIT (OUTPATIENT)
Dept: DERMATOLOGY | Facility: CLINIC | Age: 48
End: 2023-07-12
Attending: DERMATOLOGY
Payer: COMMERCIAL

## 2023-07-12 VITALS — HEART RATE: 63 BPM | DIASTOLIC BLOOD PRESSURE: 104 MMHG | SYSTOLIC BLOOD PRESSURE: 163 MMHG

## 2023-07-12 DIAGNOSIS — D03.59 MELANOMA IN SITU OF BACK (H): ICD-10-CM

## 2023-07-12 PROCEDURE — 12032 INTMD RPR S/A/T/EXT 2.6-7.5: CPT | Mod: GC | Performed by: DERMATOLOGY

## 2023-07-12 PROCEDURE — 88305 TISSUE EXAM BY PATHOLOGIST: CPT | Mod: 26 | Performed by: DERMATOLOGY

## 2023-07-12 PROCEDURE — 88342 IMHCHEM/IMCYTCHM 1ST ANTB: CPT | Mod: 26 | Performed by: DERMATOLOGY

## 2023-07-12 PROCEDURE — 11602 EXC TR-EXT MAL+MARG 1.1-2 CM: CPT | Mod: GC | Performed by: DERMATOLOGY

## 2023-07-12 PROCEDURE — 88341 IMHCHEM/IMCYTCHM EA ADD ANTB: CPT | Mod: 26 | Performed by: DERMATOLOGY

## 2023-07-12 PROCEDURE — 88341 IMHCHEM/IMCYTCHM EA ADD ANTB: CPT | Mod: TC | Performed by: DERMATOLOGY

## 2023-07-12 ASSESSMENT — PAIN SCALES - GENERAL: PAINLEVEL: NO PAIN (0)

## 2023-07-12 NOTE — PATIENT INSTRUCTIONS

## 2023-07-12 NOTE — NURSING NOTE
Chief Complaint   Patient presents with     Derm Problem     Patient is here today for excision of melanoma on the back.     Samia WANG RN

## 2023-07-12 NOTE — LETTER
7/12/2023       RE: Ree Batres  [address]  [facility] [address]     Dear Colleague,    Thank you for referring your patient, Ree Batres, to the [facility] DERMATOLOGIC SURGERY CLINIC [place] [facility]. Please see a copy of my visit note below.    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/2023  - ketoconazole 2% shampoo, clobetasol 0.05% ointment  # Eczematous dermatitis   - TMC 0.1% ointment  # Benign bx:   - R paraspinal mid back, s/p shave bx 4/4/2023  - L inguinal fold, s/p shave bx 4/4/2023       NAME OF PROCEDURE: Excision intermediate layered linear closure  Staff surgeon: Souleymane Byrne MD  Resident: Katharina Alvarenga MD  Scrub Nurse: Samia Braxton RN    PRE-OPERATIVE DIAGNOSIS:  Melanoma in situ  POST-OPERATIVE DIAGNOSIS: Same   LOCATION: Left paraspinal lower back   FINAL EXCISION SIZE(DEFECT SIZE): 1.7 cm  MARGIN: 0.5 cm  FINAL REPAIR LENGTH: 5.0 cm   ANESTHESIA: 6mL 1% lidocaine with 1:100,000 epinephrine    INDICATIONS: This patient presented with a 0.7 cm Melanoma in situ. Excision was indicated. We discussed the principles of treatment and most likely complications including scarring, bleeding, infection, incomplete excision, wound dehiscence, pain, and recurrence. Informed consent was obtained and the patient underwent the procedure as follows:    PROCEDURE: The patient was taken to the operative suite. Time-out was performed.  The treatment area was anesthetized with 1% lidocaine with epinephrine. The area was prepped with Chlorhexidine and rinsed with sterile saline and draped with sterile towels. The lesion was delineated and excised down to subcutaneous fat in a elliptical manner. Hemostasis was obtained by  electrocoagulation.     REPAIR: An intermediate layered linear closure was selected as the procedure which would maximally preserve both function and cosmesis.    After the excision of the tumor, the area was carefully undermined. Hemostasis was obtained with electrocoagulation.  Closure was oriented so that the wound was in the patient's natural skin tension lines. The subcutaneous and dermal layers were then closed with 4-0 Monocryl sutures. The epidermis was then carefully approximated along the length of the wound using 4-0 Monocryl running subcuticular sutures.     Estimated blood loss was less than 10 ml for all surgical sites. A sterile pressure dressing was applied and wound care instructions, with a written handout, were given. The patient was discharged from the [facility] alert and ambulatory.    The patient elected for pathology results to automatically release and understands that the clinical staff will contact them as soon as possible to notify them of the results.    Suture removal: N/A (all dissolving sutures)    Dr. Byrne performed the surgery and was physicially present for the entirety of the procedure.    Anatomic Pathology Results: pending    Clinical Follow-Up: 10/2023 as scheduled     Staff Involved:  Fellow/Staff     Katharina Alvarenga MD  Micrographic Surgery and Dermatologic Oncology (MSDO) Fellow, PGY-5        Attestation signed by Souleymane Byrne MD at 7/13/2023  9:51 AM:  Attending attestation:  I personally performed the entire procedure.  I have reviewed the note and edited it as necessary, and agree with its contents.    Souleymane Byrne M.D.  Professor  Director of Dermatologic Surgery  Department of Dermatology  [facility]    Dermatology Surgery Clinic  [facility] [place] [id number]

## 2023-07-12 NOTE — PROGRESS NOTES
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/2023  - ketoconazole 2% shampoo, clobetasol 0.05% ointment  # Eczematous dermatitis   - TMC 0.1% ointment  # Benign bx:   - R paraspinal mid back, s/p shave bx 4/4/2023  - L inguinal fold, s/p shave bx 4/4/2023       NAME OF PROCEDURE: Excision intermediate layered linear closure  Staff surgeon: Souleymane Byrne MD  Resident: Katharina Alvarenga MD  Scrub Nurse: Samia Braxton RN    PRE-OPERATIVE DIAGNOSIS:  Melanoma in situ  POST-OPERATIVE DIAGNOSIS: Same   LOCATION: Left paraspinal lower back   FINAL EXCISION SIZE(DEFECT SIZE): 1.7 cm  MARGIN: 0.5 cm  FINAL REPAIR LENGTH: 5.0 cm   ANESTHESIA: 6mL 1% lidocaine with 1:100,000 epinephrine    INDICATIONS: This patient presented with a 0.7 cm Melanoma in situ. Excision was indicated. We discussed the principles of treatment and most likely complications including scarring, bleeding, infection, incomplete excision, wound dehiscence, pain, and recurrence. Informed consent was obtained and the patient underwent the procedure as follows:    PROCEDURE: The patient was taken to the operative suite. Time-out was performed.  The treatment area was anesthetized with 1% lidocaine with epinephrine. The area was prepped with Chlorhexidine and rinsed with sterile saline and draped with sterile towels. The lesion was delineated and excised down to subcutaneous fat in a elliptical manner. Hemostasis was obtained by electrocoagulation.     REPAIR: An intermediate layered linear closure was selected as the procedure which would maximally preserve both function and cosmesis.    After the excision of the tumor, the area was carefully undermined. Hemostasis was obtained with electrocoagulation.  Closure was oriented so that the wound was in the  patient's natural skin tension lines. The subcutaneous and dermal layers were then closed with 4-0 Monocryl sutures. The epidermis was then carefully approximated along the length of the wound using 4-0 Monocryl running subcuticular sutures.     Estimated blood loss was less than 10 ml for all surgical sites. A sterile pressure dressing was applied and wound care instructions, with a written handout, were given. The patient was discharged from the [facility] Center alert and ambulatory.    The patient elected for pathology results to automatically release and understands that the clinical staff will contact them as soon as possible to notify them of the results.    Suture removal: N/A (all dissolving sutures)    Dr. Byrne performed the surgery and was physicially present for the entirety of the procedure.    Anatomic Pathology Results: pending    Clinical Follow-Up: 10/2023 as scheduled     Staff Involved:  Fellow/Staff     Katharina Alvarenga MD  Micrographic Surgery and Dermatologic Oncology (MSDO) Fellow, PGY-5

## 2023-07-14 NOTE — TELEPHONE ENCOUNTER
CLOBETASOL PROP 0.05% SOL 50ML      Last Written Prescription Date:  5-10-22  Last Fill Quantity: 50 ml,   # refills: 11  Last Office Visit : 5-10-22  Dr. Willard,  RTC 1 Y                               4-4-22      Dr. Rendon                                7-12-23  Dr. Byrne - Procedure   Future Office visit:  10-13-23 Dr. Julieta Swan process 3     Routing refill request to provider for review/approval because:  ? Ordering provider - Julieta Wlilard process RTC  3 M, clinic note one year  Next appt outside of protocol RTC time frame

## 2023-07-17 ENCOUNTER — MYC MEDICAL ADVICE (OUTPATIENT)
Dept: DERMATOLOGY | Facility: CLINIC | Age: 48
End: 2023-07-17
Payer: COMMERCIAL

## 2023-07-17 DIAGNOSIS — L40.8 SEBOPSORIASIS: ICD-10-CM

## 2023-07-18 LAB
PATH REPORT.COMMENTS IMP SPEC: NORMAL
PATH REPORT.FINAL DX SPEC: NORMAL
PATH REPORT.GROSS SPEC: NORMAL
PATH REPORT.MICROSCOPIC SPEC OTHER STN: NORMAL
PATH REPORT.RELEVANT HX SPEC: NORMAL

## 2023-07-18 RX ORDER — CLOBETASOL PROPIONATE 0.5 MG/ML
SOLUTION TOPICAL 2 TIMES DAILY
Qty: 50 ML | Refills: 11 | Status: SHIPPED | OUTPATIENT
Start: 2023-07-18

## 2023-07-18 RX ORDER — CLOBETASOL PROPIONATE 0.5 MG/ML
SOLUTION TOPICAL
Qty: 50 ML | Refills: 2 | Status: SHIPPED | OUTPATIENT
Start: 2023-07-18

## 2023-07-18 NOTE — TELEPHONE ENCOUNTER
clobetasol 0.05 % external solution     Last Written Prescription Date:  5/10/22  Last Fill Quantity: 50 ml ,   # refills: 11  Last Office Visit : 7/12/23 Caty  4/4/23 Julieta  Future Office visit:  10/13/23 Julieta BLAND process 3

## 2023-10-06 ENCOUNTER — HOSPITAL ENCOUNTER (OUTPATIENT)
Dept: MAMMOGRAPHY | Facility: CLINIC | Age: 48
Discharge: HOME | End: 2023-10-06
Attending: OBSTETRICS & GYNECOLOGY | Admitting: OBSTETRICS & GYNECOLOGY
Payer: COMMERCIAL

## 2023-10-06 DIAGNOSIS — Z12.31 VISIT FOR SCREENING MAMMOGRAM: ICD-10-CM

## 2023-10-06 PROCEDURE — 77067 SCR MAMMO BI INCL CAD: CPT

## 2023-10-13 ENCOUNTER — OFFICE VISIT (OUTPATIENT)
Dept: DERMATOLOGY | Facility: CLINIC | Age: 48
End: 2023-10-13
Payer: COMMERCIAL

## 2023-10-13 DIAGNOSIS — L81.4 LENTIGINES: ICD-10-CM

## 2023-10-13 DIAGNOSIS — D48.9 NEOPLASM OF UNCERTAIN BEHAVIOR: ICD-10-CM

## 2023-10-13 DIAGNOSIS — L82.1 SEBORRHEIC KERATOSIS: ICD-10-CM

## 2023-10-13 DIAGNOSIS — Z85.820 HISTORY OF MELANOMA: ICD-10-CM

## 2023-10-13 DIAGNOSIS — D18.01 CHERRY ANGIOMA: ICD-10-CM

## 2023-10-13 DIAGNOSIS — D22.9 MULTIPLE BENIGN NEVI: Primary | ICD-10-CM

## 2023-10-13 PROCEDURE — 88305 TISSUE EXAM BY PATHOLOGIST: CPT | Mod: 26 | Performed by: DERMATOLOGY

## 2023-10-13 PROCEDURE — 11102 TANGNTL BX SKIN SINGLE LES: CPT | Performed by: DERMATOLOGY

## 2023-10-13 PROCEDURE — 99213 OFFICE O/P EST LOW 20 MIN: CPT | Mod: 25 | Performed by: DERMATOLOGY

## 2023-10-13 PROCEDURE — 88305 TISSUE EXAM BY PATHOLOGIST: CPT | Mod: TC | Performed by: DERMATOLOGY

## 2023-10-13 ASSESSMENT — PAIN SCALES - GENERAL: PAINLEVEL: NO PAIN (0)

## 2023-10-13 NOTE — PROGRESS NOTES
[facility] Dermatology Note  Encounter Date: Oct 13, 2023  Office Visit     Dermatology Problem List:  Last FBSC performed 10/13/23    #. NUB, R lateral mid back, S/p Biopsy 10/13/23    #. MIS, L paraspinal lower back, S/p excision 7/12/23  #. History of BCC on right hand and left lower breast~1999  # Mild DN, R paraspinal mid back, s/p shave bx 4/4/2023  #. Psoriasiform dermatitis occipital scalp, s/p Punch bx 4/4/2023  - ketoconazole 2% shampoo, clobetasol 0.05% ointment  #. Eczematous dermatitis   - TMC 0.1% ointment  #. Benign bx: \  - Intradermal melanocytic nevus, L inguinal fold, s/p shave bx 4/4/2023   #.Lesions to Monitor  - Left medial posterior shoulder, Photographed 10/13/23    Soc hx: son Luis, aged 9. Going to [place] in February.  ____________________________________________    Assessment & Plan:     # Neoplasm of uncertain behavior, R lateral mid back, s/p shave bx today  - ddx DN r/o melanoma  - see shave bx note    #Lesions to Monitor  - Left medial posterior shoulder, Photographed 10/13/23    # Multiple benign nevi.   # H/o DN.  # H/o MIS. No evidence of recurrent disease.  - Monitor for ABCDEs of melanoma   - Continue sun protection - recommend SPF 30 or higher with frequent application   - Return sooner if noticing changing or symptomatic lesions    # Benign lesions - SKs, cherry angiomas, lentigenes.  - No treatment required    # Scalp psoriasis. S/p prior bx 4/4/23.  - Well-controlled on ketoconazole shampoo, clobetasol ointment, continue prn      Procedures Performed:   None    Follow-up: 3 month(s) in-person, or earlier for new or changing lesions    Staff and Scribe:     Scribe disclosure:  Scribe Disclosure:   HELEN YANEZ, am serving as a scribe; to document services personally performed by Azul Rendon MD -based on data collection and the provider's statements to me.    Provider Disclosure:   The documentation recorded by the scribe accurately reflects  the services I personally performed and the decisions made by me.    Azul Rendon MD    Department of Dermatology  [facility]: Phone: [phone], Fax: [phone]  10/13/2023     ____________________________________________    CC: Skin Check (Here today for a skin check. No concerns. )    HPI:  Ms. Ree Batres is a(n) 48 year old female who presents today as a return patient for Skin check.     Today, patient did not report any lesions of concern.     Patient is otherwise feeling well, without additional skin concerns.    Denied any family hx of skin cancer.     Labs Reviewed:  N/A    Physical Exam:  Vitals: There were no vitals taken for this visit.  SKIN: Total skin excluding the undergarment areas was performed. The exam included the head/face, neck, both arms, chest, back, abdomen, both legs, digits and/or nails.   - There are dome shaped bright red papules on the trunk.   - Multiple regular brown pigmented macules and papules are identified on the trunk and extremities.   - Scattered brown macules on sun exposed areas.  - waxy stuck on papules/plaques on trunk and extremities.   - There is no erythema, telangectasias, nodularity, or pigmentation on the paraspinal lower back excision scar.   - Right lateral mid back there is a 5 mm light brown\ macule with foci of eccentric darker brown pigment.  - Left medial posterior shoulder 7 mm even light brown macule.  - No other lesions of concern on areas examined.       Medications:  Current Outpatient Medications   Medication    clobetasol (TEMOVATE) 0.05 % external solution    clobetasol (TEMOVATE) 0.05 % external solution    ketoconazole (NIZORAL) 2 % external shampoo    triamcinolone (KENALOG) 0.1 % external cream    bacitracin (SB BACITRACIN) 500 UNIT/GM OINT    cephALEXin (KEFLEX) 500 MG capsule    metoclopramide (REGLAN) 10 MG tablet    oxyCODONE (ROXICODONE)  5 MG tablet    senna-docusate (SENOKOT-S/PERICOLACE) 8.6-50 MG tablet     No current facility-administered medications for this visit.      Past Medical History:   Patient Active Problem List   Diagnosis    History of basal cell cancer    ACP (advance care planning)    Health Care Home    Elevated blood-pressure reading without diagnosis of hypertension     Past Medical History:   Diagnosis Date    Basal cell carcinoma of skin     H pylori ulcer     resolved    Renal disease     kidney stone in past/kidney infections       CC No referring provider defined for this encounter. on close of this encounter.

## 2023-10-13 NOTE — LETTER
10/13/2023       RE: Ree Batres  [address] [facility] [address]     Dear Colleague,    Thank you for referring your patient, Ree Batres, to the [facility] DERMATOLOGY [facility]INIC [place] [facility]. Please see a copy of my visit note below.    [facility] Dermatology Note  Encounter Date: Oct 13, 2023  Office Visit     Dermatology Problem List:  Last FBSC performed 10/13/23    #. NUB, R lateral mid back, S/p Biopsy 10/13/23    #. MIS, L paraspinal lower back, S/p excision 7/12/23  #. History of BCC on right hand and left lower breast~1999  # Mild DN, R paraspinal mid back, s/p shave bx 4/4/2023  #. Psoriasiform dermatitis occipital scalp, s/p Punch bx 4/4/2023  - ketoconazole 2% shampoo, clobetasol 0.05% ointment  #. Eczematous dermatitis   - TMC 0.1% ointment  #. Benign bx: \  - Intradermal melanocytic nevus, L inguinal fold, s/p shave bx 4/4/2023   #.Lesions to Monitor  - Left medial posterior shoulder, Photographed 10/13/23    Soc hx: son Luis, aged 9. Going to [place] in February.  ____________________________________________    Assessment & Plan:     # Neoplasm of uncertain behavior, R lateral mid back, s/p shave bx today  - ddx DN r/o melanoma  - see shave bx note    #Lesions to Monitor  - Left medial posterior shoulder, Photographed 10/13/23    # Multiple benign nevi.   # H/o DN.  # H/o MIS. No evidence of recurrent disease.  - Monitor for ABCDEs of melanoma   - Continue sun protection - recommend SPF 30 or higher with frequent application   - Return sooner if noticing changing or symptomatic lesions    # Benign lesions - SKs, cherry angiomas, lentigenes.  - No treatment required    # Scalp psoriasis. S/p prior bx 4/4/23.  - Well-controlled on ketoconazole shampoo, clobetasol ointment, continue prn      Procedures Performed:   None    Follow-up: 3 month(s) in-person, or earlier for new or changing  lesions    Staff and Scribe:     Scribe disclosure:  Scribe Disclosure:   I, HELEN URIOSTEGUI, am serving as a scribe; to document services personally performed by Azul Rendon MD -based on data collection and the provider's statements to me.    Provider Disclosure:   The documentation recorded by the scribe accurately reflects the services I personally performed and the decisions made by me.    Azul Rendon MD    Department of Dermatology  [facility]: Phone: [phone], Fax: [phone]  10/13/2023     ____________________________________________    CC: Skin Check (Here today for a skin check. No concerns. )    HPI:  Ms. Ree Batres is a(n) 48 year old female who presents today as a return patient for Skin check.     Today, patient did not report any lesions of concern.     Patient is otherwise feeling well, without additional skin concerns.    Denied any family hx of skin cancer.     Labs Reviewed:  N/A    Physical Exam:  Vitals: There were no vitals taken for this visit.  SKIN: Total skin excluding the undergarment areas was performed. The exam included the head/face, neck, both arms, chest, back, abdomen, both legs, digits and/or nails.   - There are dome shaped bright red papules on the trunk.   - Multiple regular brown pigmented macules and papules are identified on the trunk and extremities.   - Scattered brown macules on sun exposed areas.  - waxy stuck on papules/plaques on trunk and extremities.   - There is no erythema, telangectasias, nodularity, or pigmentation on the paraspinal lower back excision scar.   - Right lateral mid back there is a 5 mm light brown\ macule with foci of eccentric darker brown pigment.  - Left medial posterior shoulder 7 mm even light brown macule.  - No other lesions of concern on areas examined.       Medications:  Current Outpatient Medications   Medication      clobetasol (TEMOVATE) 0.05 % external solution     clobetasol (TEMOVATE) 0.05 % external solution     ketoconazole (NIZORAL) 2 % external shampoo     triamcinolone (KENALOG) 0.1 % external cream     bacitracin (SB BACITRACIN) 500 UNIT/GM OINT     cephALEXin (KEFLEX) 500 MG capsule     metoclopramide (REGLAN) 10 MG tablet     oxyCODONE (ROXICODONE) 5 MG tablet     senna-docusate (SENOKOT-S/PERICOLACE) 8.6-50 MG tablet     No current facility-administered medications for this visit.      Past Medical History:   Patient Active Problem List   Diagnosis     History of basal cell cancer     ACP (advance care planning)     Health Care Home     Elevated blood-pressure reading without diagnosis of hypertension     Past Medical History:   Diagnosis Date     Basal cell carcinoma of skin      H pylori ulcer     resolved     Renal disease     kidney stone in past/kidney infections       CC No referring provider defined for this encounter. on close of this encounter.        Again, thank you for allowing me to participate in the care of your patient.      Sincerely,    Azul Rendon MD

## 2023-10-13 NOTE — NURSING NOTE
Lidocaine-epinephrine 1-1:200358 % injection   0.5 mL once for one use, starting 10/13/2023 ending 10/13/2023,  2mL disp, R-0, injection  Injected by Coby Robins LPN

## 2023-10-13 NOTE — NURSING NOTE
Dermatology Rooming Note    Ree Batres's goals for this visit include:   Chief Complaint   Patient presents with    Skin Check     Here today for a skin check. No concerns.      Ellen Villela RN

## 2023-10-13 NOTE — PATIENT INSTRUCTIONS
Wound Care After a Biopsy    What is a skin biopsy?  A skin biopsy allows the doctor to examine a very small piece of tissue under the microscope to determine the diagnosis and the best treatment for the skin condition. A local anesthetic (numbing medicine) is injected with a very small needle into the skin area to be tested. A small piece of skin is taken from the area. Sometimes a suture (stitch) is used.     What are the risks of a skin biopsy?  I will experience scar, bleeding, swelling, pain, crusting and redness. I may experience incomplete removal or recurrence. Risks of this procedure are excessive bleeding, bruising, infection, nerve damage, numbness, thick (hypertrophic or keloidal) scar and non-diagnostic biopsy.    How should I care for my wound for the first 24 hours?  Keep the wound dry and covered for 24 hours  If it bleeds, hold direct pressure on the area for 15 minutes. If bleeding does not stop, call us or go to the emergency room  Avoid strenuous exercise the first 1-2 days or as your doctor instructs you    How should I care for the wound after 24 hours?  After 24 hours, remove the bandage  You may bathe or shower as normal  If you had a scalp biopsy, you can shampoo as usual and can use shower water to clean the biopsy site daily  Clean the wound once a day with gentle soap and water  Do not scrub, be gentle  Apply white petroleum/Vaseline after cleaning the wound with a cotton swab or a clean finger, and keep the site covered with a Bandaid /bandage. Bandages are not necessary with a scalp biopsy  If you are unable to cover the site with a Bandaid /bandage, re-apply ointment 2-3 times a day to keep the site moist. Moisture will help with healing  Avoid strenuous activity for first 1-2 days  Avoid lakes, rivers, pools, and oceans until the stitches are removed or the site is healed    How do I clean my wound?  Wash hands thoroughly with soap or use hand  before all wound care  Clean  the wound with gentle soap and water  Apply white petroleum/Vaseline  to wound after it is clean  Replace the Bandaid /bandage to keep the wound covered for the first few days or as instructed by your doctor  If you had a scalp biopsy, warm shower water to the area on a daily basis should suffice    What should I use to clean my wound?   Cotton-tipped applicators (Qtips )  White petroleum jelly (Vaseline ). Use a clean new container and use Q-tips to apply.  Bandaids  as needed  Gentle soap     How should I care for my wound long term?  Do not get your wound dirty  Keep up with wound care for one week or until the area is healed.  If you have stitches, stitches need to be removed in 14 days. You may return to our clinic for this or you may have it done locally at your doctor s office.  A small scab will form and fall off by itself when the area is completely healed. The area will be red and will become pink in color as it heals. Sun protection is very important for how your scar will turn out. Sunscreen with an SPF 30 or greater is recommended once the area is healed.  You should have some soreness but it should be mild and slowly go away over several days. Talk to your doctor about using tylenol for pain,    When should I call my doctor?  If you have increased:   Pain or swelling  Pus or drainage (clear or slightly yellow drainage is ok)  Temperature over 100F  Spreading redness or warmth around wound    When will I hear about my results?  The biopsy results can take 2 weeks to come back.  Your results will automatically release to [employer] before your provider has even reviewed them.  The clinic will call you with the results, send you a [employer] message, or have you schedule a follow-up clinic or phone time to discuss the results.  Contact our clinics if you do not hear from us in 2 weeks.    Who should I call with questions?  [facility]: [phone]  [facility]  [facility]: [phone]  For urgent needs outside of business hours call the [facility] at [phone] and ask for the dermatology resident on call

## 2023-10-16 LAB
PATH REPORT.COMMENTS IMP SPEC: NORMAL
PATH REPORT.COMMENTS IMP SPEC: NORMAL
PATH REPORT.FINAL DX SPEC: NORMAL
PATH REPORT.GROSS SPEC: NORMAL
PATH REPORT.MICROSCOPIC SPEC OTHER STN: NORMAL
PATH REPORT.RELEVANT HX SPEC: NORMAL

## 2023-11-03 ENCOUNTER — MYC REFILL (OUTPATIENT)
Dept: DERMATOLOGY | Facility: CLINIC | Age: 48
End: 2023-11-03
Payer: COMMERCIAL

## 2023-11-03 DIAGNOSIS — L30.9 DERMATITIS: ICD-10-CM

## 2023-11-03 DIAGNOSIS — L40.8 SEBOPSORIASIS: ICD-10-CM

## 2023-11-06 RX ORDER — KETOCONAZOLE 20 MG/ML
SHAMPOO TOPICAL DAILY PRN
Qty: 120 ML | Refills: 10 | Status: SHIPPED | OUTPATIENT
Start: 2023-11-06

## 2023-11-06 RX ORDER — TRIAMCINOLONE ACETONIDE 1 MG/G
CREAM TOPICAL 2 TIMES DAILY
Qty: 80 G | Refills: 1 | Status: SHIPPED | OUTPATIENT
Start: 2023-11-06

## 2023-11-06 NOTE — TELEPHONE ENCOUNTER
Last Clinic Visit: 10/13/2023 [facility] [place]  Dr Rendon  Future Visit: 1/23/2024   *plan last visit RTC 3 months    ketoconazole (NIZORAL) 2 % external shampoo: Refilled qty to 12 months from last visit (process #1/Derm protocol).    triamcinolone (KENALOG) 0.1 % external cream: Last clinic note reviewed.  Refilled qty to next appointment within the RTC recommended timeframe. (process #4/Derm protocol)

## 2024-01-23 ENCOUNTER — OFFICE VISIT (OUTPATIENT)
Dept: FAMILY MEDICINE | Facility: CLINIC | Age: 49
End: 2024-01-23
Attending: DERMATOLOGY
Payer: COMMERCIAL

## 2024-01-23 DIAGNOSIS — D22.9 MULTIPLE BENIGN NEVI: ICD-10-CM

## 2024-01-23 DIAGNOSIS — Z85.828 HISTORY OF NONMELANOMA SKIN CANCER: Primary | ICD-10-CM

## 2024-01-23 DIAGNOSIS — Z85.820 HISTORY OF MELANOMA: ICD-10-CM

## 2024-01-23 DIAGNOSIS — D18.01 CHERRY ANGIOMA: ICD-10-CM

## 2024-01-23 DIAGNOSIS — L81.4 LENTIGINES: ICD-10-CM

## 2024-01-23 DIAGNOSIS — L82.1 SEBORRHEIC KERATOSIS: ICD-10-CM

## 2024-01-23 PROCEDURE — 99213 OFFICE O/P EST LOW 20 MIN: CPT | Performed by: DERMATOLOGY
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
## 2024-01-23 NOTE — PATIENT INSTRUCTIONS
Patient Education       Proper skin care from [facility]:    -Eliminate harsh soaps as they strip the natural oils from the skin, often resulting in dry itchy skin ( i.e. Dial, Zest, [place] Spring)  -Use mild soaps such as Cetaphil or Dove Sensitive Skin in the shower. You do not need to use soap on arms, legs, and trunk every time you shower unless visibly soiled.   -Avoid hot or cold showers.  -After showering, lightly dry off and apply moisturizing within 2-3 minutes. This will help trap moisture in the skin.   -Aggressive use of a moisturizer at least 1-2 times a day to the entire body (including -Vanicream, Cetaphil, Aquaphor or Cerave) and moisturize hands after every washing.  -We recommend using moisturizers that come in a tub that needs to be scooped out, not a pump. This has more of an oil base. It will hold moisture in your skin much better than a water base moisturizer. The above recommended are non-pore clogging.      Wear a sunscreen with at least SPF 30 on your face, ears, neck and V of the chest daily. Wear sunscreen on other areas of the body if those areas are exposed to the sun throughout the day. Sunscreens can contain physical and/or chemical blockers. Physical blockers are less likely to clog pores, these include zinc oxide and titanium dioxide. Reapply every two hour and after swimming.     Sunscreen examples: https://www.ewg.org/sunscreen/    UV radiation  UVA radiation remains constant throughout the day and throughout the year. It is a longer wavelength than UVB and therefore penetrates deeper into the skin leading to immediate and delayed tanning, photoaging, and skin cancer. 70-80% of UVA and UVB radiation occurs between the hours of 10am-2pm.  UVB radiation  UVB radiation causes the most harmful effects and is more significant during the summer months. However, snow and ice can reflect UVB radiation leading to skin damage during the winter months as well. UVB radiation is  responsible for tanning, burning, inflammation, delayed erythema (pinkness), pigmentation (brown spots), and skin cancer.     I recommend self monthly full body exams and yearly full body exams with a dermatology provider. If you develop a new or changing lesion please follow up for examination. Most skin cancers are pink and scaly or pink and pearly. However, we do see blue/brown/black skin cancers.  Consider the ABCDEs of melanoma when giving yourself your monthly full body exam ( don't forget the groin, buttocks, feet, toes, etc). A-asymmetry, B-borders, C-color, D-diameter, E-elevation or evolving. If you see any of these changes please follow up in clinic. If you cannot see your back I recommend purchasing a hand held mirror to use with a larger wall mirror.       Checking for Skin Cancer  You can find cancer early by checking your skin each month. There are 3 kinds of skin cancer. They are melanoma, basal cell carcinoma, and squamous cell carcinoma. Doing monthly skin checks is the best way to find new marks or skin changes. Follow the instructions below for checking your skin.   The ABCDEs of checking moles for melanoma   Check your moles or growths for signs of melanoma using ABCDE:   Asymmetry: the sides of the mole or growth don t match  Border: the edges are ragged, notched, or blurred  Color: the color within the mole or growth varies  Diameter: the mole or growth is larger than 6 mm (size of a pencil eraser)  Evolving: the size, shape, or color of the mole or growth is changing (evolving is not shown in the images below)    Checking for other types of skin cancer  Basal cell carcinoma or squamous cell carcinoma have symptoms such as:     A spot or mole that looks different from all other marks on your skin  Changes in how an area feels, such as itching, tenderness, or pain  Changes in the skin's surface, such as oozing, bleeding, or scaliness  A sore that does not heal  New swelling or redness beyond  the border of a mole    Who s at risk?  Anyone can get skin cancer. But you are at greater risk if you have:   Fair skin, light-colored hair, or light-colored eyes  Many moles or abnormal moles on your skin  A history of sunburns from sunlight or tanning beds  A family history of skin cancer  A history of exposure to radiation or chemicals  A weakened immune system  If you have had skin cancer in the past, you are at risk for recurring skin cancer.   How to check your skin  Do your monthly skin checkups in front of a full-length mirror. Check all parts of your body, including your:   Head (ears, face, neck, and scalp)  Torso (front, back, and sides)  Arms (tops, undersides, upper, and lower armpits)  Hands (palms, backs, and fingers, including under the nails)  Buttocks and genitals  Legs (front, back, and sides)  Feet (tops, soles, toes, including under the nails, and between toes)  If you have a lot of moles, take digital photos of them each month. Make sure to take photos both up close and from a distance. These can help you see if any moles change over time.   Most skin changes are not cancer. But if you see any changes in your skin, call your doctor right away. Only he or she can diagnose a problem. If you have skin cancer, seeing your doctor can be the first step toward getting the treatment that could save your life.   [employer] last reviewed this educational content on 4/1/2019 2000-2020 [employer]. [address] [place] [address], [place] [address]. All rights reserved. This information is not intended as a substitute for professional medical care. Always follow your healthcare professional's instructions.       When should I call my doctor?  If you are worsening or not improving, please, contact us or seek urgent care as noted below.     Who should I call with questions (adults)?  [facility] (adult and pediatric): [phone]  [facility]  [place] (adult): [phone]  [facility] ([place], [place], [place] and [place]) [phone]  For urgent needs outside of business hours call the [facility] at [phone] and ask for the dermatology resident on call to be paged  If this is a medical emergency and you are unable to reach an ER, Call 911      If you need a prescription refill, please contact your pharmacy. Refills are approved or denied by our Physicians during normal business hours, Monday through Fridays  Per office policy, refills will not be granted if you have not been seen within the past year (or sooner depending on your child's condition)

## 2024-04-30 ENCOUNTER — OFFICE VISIT (OUTPATIENT)
Dept: FAMILY MEDICINE | Facility: CLINIC | Age: 49
End: 2024-04-30
Payer: COMMERCIAL

## 2024-04-30 DIAGNOSIS — R20.8 ALLODYNIA: ICD-10-CM

## 2024-04-30 DIAGNOSIS — L82.1 SEBORRHEIC KERATOSIS: ICD-10-CM

## 2024-04-30 DIAGNOSIS — Z85.828 HISTORY OF NONMELANOMA SKIN CANCER: ICD-10-CM

## 2024-04-30 DIAGNOSIS — D49.2 NEOPLASM OF SKIN: ICD-10-CM

## 2024-04-30 DIAGNOSIS — D22.9 MULTIPLE BENIGN NEVI: ICD-10-CM

## 2024-04-30 DIAGNOSIS — Z85.820 HISTORY OF MELANOMA: Primary | ICD-10-CM

## 2024-04-30 DIAGNOSIS — D18.01 CHERRY ANGIOMA: ICD-10-CM

## 2024-04-30 DIAGNOSIS — L81.4 LENTIGINES: ICD-10-CM

## 2024-04-30 PROCEDURE — 99214 OFFICE O/P EST MOD 30 MIN: CPT | Mod: 25 | Performed by: DERMATOLOGY

## 2024-04-30 PROCEDURE — 11102 TANGNTL BX SKIN SINGLE LES: CPT | Performed by: DERMATOLOGY

## 2024-04-30 PROCEDURE — 88305 TISSUE EXAM BY PATHOLOGIST: CPT | Performed by: DERMATOLOGY

## 2024-04-30 RX ORDER — GABAPENTIN 100 MG/1
100 CAPSULE ORAL 3 TIMES DAILY
Qty: 90 CAPSULE | Refills: 3 | Status: SHIPPED | OUTPATIENT
Start: 2024-04-30 | End: 2024-07-30

## 2024-04-30 NOTE — PATIENT INSTRUCTIONS
Patient Education       Proper skin care from [facility]:    -Eliminate harsh soaps as they strip the natural oils from the skin, often resulting in dry itchy skin ( i.e. Dial, Zest, [place] Spring)  -Use mild soaps such as Cetaphil or Dove Sensitive Skin in the shower. You do not need to use soap on arms, legs, and trunk every time you shower unless visibly soiled.   -Avoid hot or cold showers.  -After showering, lightly dry off and apply moisturizing within 2-3 minutes. This will help trap moisture in the skin.   -Aggressive use of a moisturizer at least 1-2 times a day to the entire body (including -Vanicream, Cetaphil, Aquaphor or Cerave) and moisturize hands after every washing.  -We recommend using moisturizers that come in a tub that needs to be scooped out, not a pump. This has more of an oil base. It will hold moisture in your skin much better than a water base moisturizer. The above recommended are non-pore clogging.      Wear a sunscreen with at least SPF 30 on your face, ears, neck and V of the chest daily. Wear sunscreen on other areas of the body if those areas are exposed to the sun throughout the day. Sunscreens can contain physical and/or chemical blockers. Physical blockers are less likely to clog pores, these include zinc oxide and titanium dioxide. Reapply every two hour and after swimming.     Sunscreen examples: https://www.ewg.org/sunscreen/    UV radiation  UVA radiation remains constant throughout the day and throughout the year. It is a longer wavelength than UVB and therefore penetrates deeper into the skin leading to immediate and delayed tanning, photoaging, and skin cancer. 70-80% of UVA and UVB radiation occurs between the hours of 10am-2pm.  UVB radiation  UVB radiation causes the most harmful effects and is more significant during the summer months. However, snow and ice can reflect UVB radiation leading to skin damage during the winter months as well. UVB radiation is  responsible for tanning, burning, inflammation, delayed erythema (pinkness), pigmentation (brown spots), and skin cancer.     I recommend self monthly full body exams and yearly full body exams with a dermatology provider. If you develop a new or changing lesion please follow up for examination. Most skin cancers are pink and scaly or pink and pearly. However, we do see blue/brown/black skin cancers.  Consider the ABCDEs of melanoma when giving yourself your monthly full body exam ( don't forget the groin, buttocks, feet, toes, etc). A-asymmetry, B-borders, C-color, D-diameter, E-elevation or evolving. If you see any of these changes please follow up in clinic. If you cannot see your back I recommend purchasing a hand held mirror to use with a larger wall mirror.       Checking for Skin Cancer  You can find cancer early by checking your skin each month. There are 3 kinds of skin cancer. They are melanoma, basal cell carcinoma, and squamous cell carcinoma. Doing monthly skin checks is the best way to find new marks or skin changes. Follow the instructions below for checking your skin.   The ABCDEs of checking moles for melanoma   Check your moles or growths for signs of melanoma using ABCDE:   Asymmetry: the sides of the mole or growth don t match  Border: the edges are ragged, notched, or blurred  Color: the color within the mole or growth varies  Diameter: the mole or growth is larger than 6 mm (size of a pencil eraser)  Evolving: the size, shape, or color of the mole or growth is changing (evolving is not shown in the images below)    Checking for other types of skin cancer  Basal cell carcinoma or squamous cell carcinoma have symptoms such as:     A spot or mole that looks different from all other marks on your skin  Changes in how an area feels, such as itching, tenderness, or pain  Changes in the skin's surface, such as oozing, bleeding, or scaliness  A sore that does not heal  New swelling or redness beyond  the border of a mole    Who s at risk?  Anyone can get skin cancer. But you are at greater risk if you have:   Fair skin, light-colored hair, or light-colored eyes  Many moles or abnormal moles on your skin  A history of sunburns from sunlight or tanning beds  A family history of skin cancer  A history of exposure to radiation or chemicals  A weakened immune system  If you have had skin cancer in the past, you are at risk for recurring skin cancer.   How to check your skin  Do your monthly skin checkups in front of a full-length mirror. Check all parts of your body, including your:   Head (ears, face, neck, and scalp)  Torso (front, back, and sides)  Arms (tops, undersides, upper, and lower armpits)  Hands (palms, backs, and fingers, including under the nails)  Buttocks and genitals  Legs (front, back, and sides)  Feet (tops, soles, toes, including under the nails, and between toes)  If you have a lot of moles, take digital photos of them each month. Make sure to take photos both up close and from a distance. These can help you see if any moles change over time.   Most skin changes are not cancer. But if you see any changes in your skin, call your doctor right away. Only he or she can diagnose a problem. If you have skin cancer, seeing your doctor can be the first step toward getting the treatment that could save your life.   [email or web address] last reviewed this educational content on 4/1/2019 2000-2020 The "[employer] [address] [place] [address], [place] [address]. All rights reserved. This information is not intended as a substitute for professional medical care. Always follow your healthcare professional's instructions.       When should I call my doctor?  If you are worsening or not improving, please, contact us or seek urgent care as noted below.     Who should I call with questions (adults)?  [facility] (adult and pediatric): [phone]  [facility]  [place] (adult): [phone]  [facility] ([employer], [place], [place] and [place]) [phone]  For urgent needs outside of business hours call the [facility] at [phone] and ask for the dermatology resident on call to be paged  If this is a medical emergency and you are unable to reach an ER, Call 911      If you need a prescription refill, please contact your pharmacy. Refills are approved or denied by our Physicians during normal business hours, Monday through Fridays  Per office policy, refills will not be granted if you have not been seen within the past year (or sooner depending on your child's condition)
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
## 2024-04-30 NOTE — LETTER
4/30/2024         RE: Ree Batres  [address] [facility] [address]        Dear Colleague,    Thank you for referring your patient, Ree Batres, to the [facility]. Please see a copy of my visit note below.    [facility] Dermatology Note  Encounter Date: Apr 30, 2024  Office Visit     Dermatology Problem List:  FBSE: 4/20/24    #NUB L medial posterior shoulder, S/p Biopsy performed on 4/20/24: Pending results.     1. MIS, L paraspinal lower back, S/p excision 7/12/23  2. NMSC  - BCC on right hand and left lower breast~1999  3. DN:  - Mild DN, R paraspinal mid back, s/p shave bx 4/4/2023  - Lentiginous compound melanocytic nevus with mild atypia, R lateral mid back, S/p Biopsy 10/13/23  4. 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 Eczematous dermatitis   - TMC 0.1% ointment  6. Benign bx:   - Intradermal melanocytic nevus, L inguinal fold, s/p shave bx 4/4/2023   7. Allodynia after severe burn on her lower legs related to LHR.   - Tx: Gabapentin 100 mg TID     #.Lesions to Monitor  - Left medial posterior shoulder, Photographed 10/13/23. Unchanged from prior.        Soc hx: son Luis, aged 9.     ____________________________________________    Assessment & Plan:    # Neoplasm of unspecified behavior of the skin (D49.2) on the L medial posterior shoulder. The differential diagnosis includes DN vs other. .   - Shave biopsy performed today (see procedure note(s) below).   - Photographed today     #Allodynia after severe burn on her lower legs related to LHR. Patient noted that she used gabapentin in the past which was 300 mg three times daily and she was unable to tolerate so she discontinued use. I discussed possible resuming use but a more tolerable dose.  - Start on gabapentin 100 mg, three times daily as tolerated.  - Will follow up in 3 months    # Benign lesions -  SKs, cherry angiomas, lentigenes.  - No treatment required    # Multiple benign nevi.   # H/o DN.  # H/o MIS. No evidence of recurrent disease.  - Monitor for ABCDEs of melanoma   - Continue sun protection - recommend SPF 30 or higher with frequent application   - Return sooner if noticing changing or symptomatic lesions       Procedures Performed:   - Shave biopsy procedure note, location(s): see above. After discussion of benefits and risks including but not limited to bleeding, infection, scar, incomplete removal, recurrence, and non-diagnostic biopsy, written consent and photographs were obtained. The area was cleaned with isopropyl alcohol. 0.5mL of 1% lidocaine with epinephrine was injected to obtain adequate anesthesia of lesion(s). Shave biopsy at site(s) performed. Hemostasis was achieved with aluminium chloride. Petrolatum ointment and a sterile dressing were applied. The patient tolerated the procedure and no complications were noted. The patient was provided with verbal and written post care instructions.       Follow-up: 3 months, sooner if concerns.     Staff and Scribe:   Scribe Disclosure:   I, HELEN URIOSTEGUI, am serving as a scribe; to document services personally performed by Azul Rendon MD -based on data collection and the provider's statements to me.     Provider Disclosure:   The documentation recorded by the scribe accurately reflects the services I personally performed and the decisions made by me.    Azul Rendon MD    Department of Dermatology  [facility]: Phone: [phone], Fax: [phone]  5/3/2024       ____________________________________________    CC: Skin Check (3 month FBSC)    HPI:  Ms. Ree Batres is a(n) 49 year old female who presents today as a return patient for FBSC.     Today patient  reported that due to her severe burn on her lower legs, when she goes for a run  she has a throbbing leg pain. Notes that even when she shaves her legs her legs are really sensitive. It is gertting better but the burn was about 1 year ago.    Shazia was great, going to [place] soon.    No painful, bleeding, non-healing, or otherwise symptomatic lesions.       Patient is otherwise feeling well, without additional skin concerns.    Labs Reviewed:  N/A    Physical Exam:  Vitals: There were no vitals taken for this visit.  SKIN: Total skin excluding the undergarment areas was performed. The exam included the head/face, neck, both arms, chest, back, abdomen, both legs, digits and/or nails.   - - There are dome shaped bright red papules on the trunk and extremities .   - Multiple regular brown pigmented macules and papules are identified on the trunk and extremities. .   - Scattered brown macules on sun exposed areas.  - Waxy stuck on papules and plaques on trunk and extremities.   - Mid Paraspinal upper back there is a 5.5 mm light brown macule with brown pigmented: dermoscopy is particular without concerning feature, Inferior to this on L paraspinal mid back there is a 5 mm light brown macule, dermoscopy: globules evenly in the periphery. Compared to prior photos these are stable.  - L medial posterior shoulder there is a 6.5 mm even light brown macule.    - No other lesions of concern on areas examined.     Medications:  Current Outpatient Medications   Medication Sig Dispense Refill     clobetasol (TEMOVATE) 0.05 % external solution APPLY TOPICALLY TO THE AFFECTED AREA TWICE DAILY 50 mL 2     clobetasol (TEMOVATE) 0.05 % external solution Apply topically 2 times daily 50 mL 11     ketoconazole (NIZORAL) 2 % external shampoo Apply topically daily as needed for itching or irritation 120 mL 10     triamcinolone (KENALOG) 0.1 % external cream Apply topically 2 times daily as instructed. 80 g 1     No current facility-administered medications for this visit.      Past Medical History:   Patient Active  Problem List   Diagnosis     History of basal cell cancer     ACP (advance care planning)     Health Care Home     Elevated blood-pressure reading without diagnosis of hypertension     Past Medical History:   Diagnosis Date     Basal cell carcinoma of skin      H pylori ulcer     resolved     Malignant melanoma (H)      Renal disease     kidney stone in past/kidney infections        CC No referring provider defined for this encounter. on close of this encounter.      Again, thank you for allowing me to participate in the care of your patient.        Sincerely,        Azul Rendon MD

## 2024-05-12 ENCOUNTER — HOSPITAL ENCOUNTER (EMERGENCY)
Facility: CLINIC | Age: 49
Discharge: HOME | End: 2024-05-12
Attending: EMERGENCY MEDICINE | Admitting: EMERGENCY MEDICINE
Payer: COMMERCIAL

## 2024-05-12 VITALS
SYSTOLIC BLOOD PRESSURE: 135 MMHG | HEART RATE: 84 BPM | RESPIRATION RATE: 13 BRPM | OXYGEN SATURATION: 95 % | DIASTOLIC BLOOD PRESSURE: 66 MMHG | TEMPERATURE: 99.1 F

## 2024-05-12 DIAGNOSIS — T78.2XXA ANAPHYLAXIS, INITIAL ENCOUNTER: ICD-10-CM

## 2024-05-12 PROCEDURE — 96374 THER/PROPH/DIAG INJ IV PUSH: CPT

## 2024-05-12 PROCEDURE — 99285 EMERGENCY DEPT VISIT HI MDM: CPT | Mod: 25

## 2024-05-12 PROCEDURE — 96375 TX/PRO/DX INJ NEW DRUG ADDON: CPT

## 2024-05-12 PROCEDURE — 96361 HYDRATE IV INFUSION ADD-ON: CPT

## 2024-05-12 PROCEDURE — 250N000009 HC RX 250: Performed by: EMERGENCY MEDICINE

## 2024-05-12 PROCEDURE — 96372 THER/PROPH/DIAG INJ SC/IM: CPT | Mod: 59

## 2024-05-12 PROCEDURE — 258N000003 HC RX IP 258 OP 636: Performed by: EMERGENCY MEDICINE

## 2024-05-12 PROCEDURE — 250N000011 HC RX IP 250 OP 636: Performed by: EMERGENCY MEDICINE

## 2024-05-12 RX ORDER — METHYLPREDNISOLONE SODIUM SUCCINATE 125 MG/2ML
125 INJECTION, POWDER, LYOPHILIZED, FOR SOLUTION INTRAMUSCULAR; INTRAVENOUS ONCE
Status: COMPLETED | OUTPATIENT
Start: 2024-05-12 | End: 2024-05-12

## 2024-05-12 RX ORDER — EPINEPHRINE 0.3 MG/.3ML
0.3 INJECTION SUBCUTANEOUS
Qty: 2 EACH | Refills: 0 | Status: SHIPPED | OUTPATIENT
Start: 2024-05-12

## 2024-05-12 RX ORDER — PREDNISONE 20 MG/1
TABLET ORAL
Qty: 10 TABLET | Refills: 0 | Status: SHIPPED | OUTPATIENT
Start: 2024-05-12

## 2024-05-12 RX ORDER — FAMOTIDINE 20 MG/1
20 TABLET, FILM COATED ORAL 2 TIMES DAILY
Qty: 10 TABLET | Refills: 0 | Status: SHIPPED | OUTPATIENT
Start: 2024-05-12 | End: 2024-05-17

## 2024-05-12 RX ORDER — DIPHENHYDRAMINE HYDROCHLORIDE 50 MG/ML
50 INJECTION INTRAMUSCULAR; INTRAVENOUS ONCE
Status: COMPLETED | OUTPATIENT
Start: 2024-05-12 | End: 2024-05-12

## 2024-05-12 RX ADMIN — EPINEPHRINE 0.5 MG: 1 INJECTION INTRAMUSCULAR; INTRAVENOUS; SUBCUTANEOUS at 19:51

## 2024-05-12 RX ADMIN — DIPHENHYDRAMINE HYDROCHLORIDE 50 MG: 50 INJECTION, SOLUTION INTRAMUSCULAR; INTRAVENOUS at 19:58

## 2024-05-12 RX ADMIN — FAMOTIDINE 20 MG: 10 INJECTION, SOLUTION INTRAVENOUS at 20:00

## 2024-05-12 RX ADMIN — METHYLPREDNISOLONE SODIUM SUCCINATE 125 MG: 125 INJECTION, POWDER, FOR SOLUTION INTRAMUSCULAR; INTRAVENOUS at 20:27

## 2024-05-12 RX ADMIN — SODIUM CHLORIDE 1000 ML: 9 INJECTION, SOLUTION INTRAVENOUS at 19:55

## 2024-05-12 ASSESSMENT — COLUMBIA-SUICIDE SEVERITY RATING SCALE - C-SSRS
2. HAVE YOU ACTUALLY HAD ANY THOUGHTS OF KILLING YOURSELF IN THE PAST MONTH?: NO
1. IN THE PAST MONTH, HAVE YOU WISHED YOU WERE DEAD OR WISHED YOU COULD GO TO SLEEP AND NOT WAKE UP?: NO
6. HAVE YOU EVER DONE ANYTHING, STARTED TO DO ANYTHING, OR PREPARED TO DO ANYTHING TO END YOUR LIFE?: NO

## 2024-05-12 ASSESSMENT — ACTIVITIES OF DAILY LIVING (ADL)
ADLS_ACUITY_SCORE: 35

## 2024-05-13 NOTE — ED PROVIDER NOTES
Emergency Department Note      History of Present Illness     Chief Complaint  Allergic Reaction (Unknown trigger, +hives, + trouble swallowing.)    HPI    Ree Batres is a 49 year old female presenting for evaluation of an allergic reaction. Ree explains that she woke up at 0100 this morning with diffuse itchy hives, which progressed with throat tightness 20 minutes prior to ED arrival that has since radiated to her chest. She states that she ate homemade pizza for dinner last night. She denies a history of similar reactions. She denies use of new medications, detergents, or soaps. She denies use of daily medications. She notes allergies to amoxicillin, codeine, and Zofran.    Independent Historian  None    Review of External Notes  None    Past Medical History   Medical History and Problem List  Basal cell carcinoma  H pylori ulcer  Malignant melanoma  Renal disease    Medications  Neurontin  Atarax  Prometrium  Aldactone  [place]  Prozac    Surgical History   Appendectomy  Cholecystectomy  Colposcopy, conization, combined  Knee surgery, left  Oophorectomy  LEEP procedure    Physical Exam   Patient Vitals for the past 24 hrs:   BP Temp Temp src Pulse Resp SpO2   05/12/24 2202 135/66 -- -- 77 10 94 %   05/12/24 2143 138/74 -- -- 77 21 92 %   05/12/24 2125 (!) 140/67 -- -- 73 18 95 %   05/12/24 2055 135/68 -- -- 74 20 96 %   05/12/24 2045 (!) 151/84 -- -- 80 20 98 %   05/12/24 2015 138/79 -- -- 79 22 100 %   05/12/24 2000 125/75 -- -- 79 21 97 %   05/12/24 1939 (!) 143/85 99.1  F (37.3  C) Oral 82 20 100 %     Physical Exam      HEENT:    Oropharynx is moist, without lesions or trismus.     No posterior pharyngeal edema.     No pooling of secretions.     Voice hoarse  Eyes:    Conjunctiva normal  Neck:     Supple, no meningismus.     CV:     Regular rate and rhythm.      No murmurs, rubs or gallops.       No  lower extremity edema.  PULM:    Clear to auscultation bilateral.       No respiratory distress.       Good air exchange.     No wheezing or stridor.  ABD:    Soft, non-tender, non-distended.      No rebound, guarding or rigidity.  MSK:     No gross deformity to all four extremities.   LYMPH:   No cervical lymphadenopathy.  NEURO:   Alert, good muscular tone, no atrophy.   Skin:    Warm, dry and intact.      Scattered areas of urticaria to the waistline and inframammary folds  Psych:    Mood is good and affect is appropriate.      Diagnostics   Independent Interpretation  None    ED Course    Medications Administered  Medications   famotidine (PEPCID) injection 20 mg (20 mg Intravenous $Given 5/12/24 2000)   diphenhydrAMINE (BENADRYL) injection 50 mg (50 mg Intravenous $Given 5/12/24 1958)   EPINEPHrine (ADRENALIN) kit 0.5 mg (0.5 mg Intramuscular $Given 5/12/24 1951)   sodium chloride 0.9% BOLUS 1,000 mL (0 mLs Intravenous Stopped 5/12/24 2126)   methylPREDNISolone sodium succinate (solu-MEDROL) injection 125 mg (125 mg Intravenous $Given 5/12/24 2027)     Discussion of Management  None    Social Determinants of Health adding to complexity of care  None    ED Course  ED Course as of 05/12/24 2204   Sun May 12, 2024   1940 I obtained history and examined the patient as noted above.   2041 I rechecked the patient.   2155 I rechecked the patient. I discussed findings and discharge with the patient. All questions answered.      Medical Decision Making / Diagnosis   MIPS     None    [facility]    Ree Batres is a 49 year old female 49-year-old female presents with progressively worsening allergic phenomenon throughout the day.  I suspect environmental exposure as a likely inciting event.  Patient had urticaria and a hoarse voice indicating anaphylaxis.  She is given antihistamines, steroids and IM epinephrine.  On reexamination, she had complete resolution of her abnormal phonation and throat discomfort.  Hives slowly improved and resolved throughout ED course.  She was observed for > 2 hours with no evidence of  rebound phenomenon.  Patient safe for discharge home with prednisone, antihistamines and EpiPen as needed.  Close follow-up with PCP.    Disposition  The patient was discharged.     ICD-10 Codes:    ICD-10-CM    1. Anaphylaxis, initial encounter  T78.2XXA         Discharge Medications  New Prescriptions    EPINEPHRINE (ANY BX GENERIC EQUIV) 0.3 MG/0.3ML INJECTION 2-PACK    Inject 0.3 mLs (0.3 mg) into the muscle once as needed for anaphylaxis May repeat one time in 5-15 minutes if response to initial dose is inadequate.    FAMOTIDINE (PEPCID) 20 MG TABLET    Take 1 tablet (20 mg) by mouth 2 times daily for 5 days    PREDNISONE (DELTASONE) 20 MG TABLET    Take two tablets (= 40mg) each day for 5 (five) days     Scribe Disclosure:  I, Sharad De La Cruz, am serving as a scribe at 7:56 PM on 5/12/2024 to document services personally performed by Vincent Mayen MD based on my observations and the provider's statements to me.     [employer]      Vincent Mayen MD  05/12/24 8827

## 2024-05-25 ENCOUNTER — HEALTH MAINTENANCE LETTER (OUTPATIENT)
Age: 49
End: 2024-05-25

## 2024-06-17 PROBLEM — Z76.89 HEALTH CARE HOME: Status: RESOLVED | Noted: 2017-02-08 | Resolved: 2024-06-17

## 2024-07-30 ENCOUNTER — OFFICE VISIT (OUTPATIENT)
Dept: DERMATOLOGY | Facility: CLINIC | Age: 49
End: 2024-07-30
Payer: COMMERCIAL

## 2024-07-30 DIAGNOSIS — Z86.018 HISTORY OF DYSPLASTIC NEVUS: ICD-10-CM

## 2024-07-30 DIAGNOSIS — Z85.820 HISTORY OF MELANOMA: Primary | ICD-10-CM

## 2024-07-30 DIAGNOSIS — C44.90 NON-MELANOMA SKIN CANCER: ICD-10-CM

## 2024-07-30 DIAGNOSIS — R20.8 ALLODYNIA: ICD-10-CM

## 2024-07-30 DIAGNOSIS — L82.1 SEBORRHEIC KERATOSIS: ICD-10-CM

## 2024-07-30 DIAGNOSIS — D22.9 MULTIPLE BENIGN NEVI: ICD-10-CM

## 2024-07-30 DIAGNOSIS — D18.01 CHERRY ANGIOMA: ICD-10-CM

## 2024-07-30 DIAGNOSIS — L81.4 LENTIGINES: ICD-10-CM

## 2024-07-30 PROCEDURE — 99213 OFFICE O/P EST LOW 20 MIN: CPT | Performed by: DERMATOLOGY

## 2024-07-30 RX ORDER — GABAPENTIN 100 MG/1
100 CAPSULE ORAL AT BEDTIME
Qty: 90 CAPSULE | Refills: 3 | Status: SHIPPED | OUTPATIENT
Start: 2024-07-30

## 2024-07-30 NOTE — PATIENT INSTRUCTIONS
Proper skin care from [facility]:    -Eliminate harsh soaps as they strip the natural oils from the skin, often resulting in dry itchy skin ( i.e. Dial, Zest, [place] Spring)  -Use mild soaps such as Cetaphil or Dove Sensitive Skin in the shower. You do not need to use soap on arms, legs, and trunk every time you shower unless visibly soiled.   -Avoid hot or cold showers.  -After showering, lightly dry off and apply moisturizing within 2-3 minutes. This will help trap moisture in the skin.   -Aggressive use of a moisturizer at least 1-2 times a day to the entire body (including -Vanicream, Cetaphil, Aquaphor or Cerave) and moisturize hands after every washing.  -We recommend using moisturizers that come in a tub that needs to be scooped out, not a pump. This has more of an oil base. It will hold moisture in your skin much better than a water base moisturizer. The above recommended are non-pore clogging.      Wear a sunscreen with at least SPF 30 on your face, ears, neck and V of the chest daily. Wear sunscreen on other areas of the body if those areas are exposed to the sun throughout the day. Sunscreens can contain physical and/or chemical blockers. Physical blockers are less likely to clog pores, these include zinc oxide and titanium dioxide. Reapply every two hour and after swimming.     Sunscreen examples: https://www.ewg.org/sunscreen/    UV radiation  UVA radiation remains constant throughout the day and throughout the year. It is a longer wavelength than UVB and therefore penetrates deeper into the skin leading to immediate and delayed tanning, photoaging, and skin cancer. 70-80% of UVA and UVB radiation occurs between the hours of 10am-2pm.  UVB radiation  UVB radiation causes the most harmful effects and is more significant during the summer months. However, snow and ice can reflect UVB radiation leading to skin damage during the winter months as well. UVB radiation is responsible for tanning,  burning, inflammation, delayed erythema (pinkness), pigmentation (brown spots), and skin cancer.     I recommend self monthly full body exams and yearly full body exams with a dermatology provider. If you develop a new or changing lesion please follow up for examination. Most skin cancers are pink and scaly or pink and pearly. However, we do see blue/brown/black skin cancers.  Consider the ABCDEs of melanoma when giving yourself your monthly full body exam ( don't forget the groin, buttocks, feet, toes, etc). A-asymmetry, B-borders, C-color, D-diameter, E-elevation or evolving. If you see any of these changes please follow up in clinic. If you cannot see your back I recommend purchasing a hand held mirror to use with a larger wall mirror.       Checking for Skin Cancer  You can find cancer early by checking your skin each month. There are 3 kinds of skin cancer. They are melanoma, basal cell carcinoma, and squamous cell carcinoma. Doing monthly skin checks is the best way to find new marks or skin changes. Follow the instructions below for checking your skin.   The ABCDEs of checking moles for melanoma   Check your moles or growths for signs of melanoma using ABCDE:   Asymmetry: the sides of the mole or growth don t match  Border: the edges are ragged, notched, or blurred  Color: the color within the mole or growth varies  Diameter: the mole or growth is larger than 6 mm (size of a pencil eraser)  Evolving: the size, shape, or color of the mole or growth is changing (evolving is not shown in the images below)    Checking for other types of skin cancer  Basal cell carcinoma or squamous cell carcinoma have symptoms such as:     A spot or mole that looks different from all other marks on your skin  Changes in how an area feels, such as itching, tenderness, or pain  Changes in the skin's surface, such as oozing, bleeding, or scaliness  A sore that does not heal  New swelling or redness beyond the border of a  mole    Who s at risk?  Anyone can get skin cancer. But you are at greater risk if you have:   Fair skin, light-colored hair, or light-colored eyes  Many moles or abnormal moles on your skin  A history of sunburns from sunlight or tanning beds  A family history of skin cancer  A history of exposure to radiation or chemicals  A weakened immune system  If you have had skin cancer in the past, you are at risk for recurring skin cancer.   How to check your skin  Do your monthly skin checkups in front of a full-length mirror. Check all parts of your body, including your:   Head (ears, face, neck, and scalp)  Torso (front, back, and sides)  Arms (tops, undersides, upper, and lower armpits)  Hands (palms, backs, and fingers, including under the nails)  Buttocks and genitals  Legs (front, back, and sides)  Feet (tops, soles, toes, including under the nails, and between toes)  If you have a lot of moles, take digital photos of them each month. Make sure to take photos both up close and from a distance. These can help you see if any moles change over time.   Most skin changes are not cancer. But if you see any changes in your skin, call your doctor right away. Only he or she can diagnose a problem. If you have skin cancer, seeing your doctor can be the first step toward getting the treatment that could save your life.   [employer] last reviewed this educational content on 4/1/2019 2000-2020 [employer]. [address] [place] [address], [place] [address]. All rights reserved. This information is not intended as a substitute for professional medical care. Always follow your healthcare professional's instructions.       When should I call my doctor?  If you are worsening or not improving, please, contact us or seek urgent care as noted below.     Who should I call with questions (adults)?    [facility] [phone]  For urgent needs outside of business hours call the [facility] at  [phone] and ask for the dermatology resident on call to be paged  If this is a medical emergency and you are unable to reach an ER, Call 911      If you need a prescription refill, please contact your pharmacy. Refills are approved or denied by our Physicians during normal business hours, Monday through Fridays  Per office policy, refills will not be granted if you have not been seen within the past year (or sooner depending on your child's condition)

## 2024-07-30 NOTE — LETTER
7/30/2024      Ree Batres  [address] [facility] [address]      Dear Colleague,    Thank you for referring your patient, Ree Batres, to the [facility]. Please see a copy of my visit note below.    [facility] Dermatology Note  Encounter Date: Jul 30, 2024  Office Visit      Dermatology Problem List:  FBSE: 7/30/24    1. MIS, L paraspinal lower back, S/p excision 7/12/23  2. NMSC  - BCC on right hand and left lower breast~1999  3. DN:  -  Lentiginous compound melanocytic nevus with mild atypia, L medial posterior shoulder, Bx proven on 4/30/24  - Mild DN, R paraspinal mid back, s/p shave bx 4/4/2023  - Lentiginous compound melanocytic nevus with mild atypia, R lateral mid back, S/p Biopsy 10/13/23  4. 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 Eczematous dermatitis   - TMC 0.1% ointment  6. Benign bx:   - Intradermal melanocytic nevus, L inguinal fold, s/p shave bx 4/4/2023   7. Allodynia after severe burn on her lower legs related to LHR.   - Tx: Gabapentin 100 mg nightly    #.Lesions to Monitor  - Left medial posterior shoulder, Photographed 10/13/23. Unchanged from prior.        Soc hx: son Luis, aged 9.   ____________________________________________    Assessment & Plan:    #Allodynia after severe burn on her lower legs related to LHR. Tolerating Gabapentin 100 nightly and this has made a huge difference!  - Continue use of gabapentin 100 mg nightly.     # Multiple benign nevi.   # H/o DN.  - Monitor for ABCDEs of melanoma   - Continue sun protection - recommend SPF 30 or higher with frequent application   - Return sooner if noticing changing or symptomatic lesions    # Benign lesions - SKs, cherry angiomas, lentigenes.  - No treatment required    # History of melanoma.  - no evidence of recurrent disease via inspection or palpation; all sites well-healed  - lymph  node exam negative for lymphadenopathy  - continue photoprotection (such as SPF30+ sunscreen and proper use, UPF clothing, sun avoidance, tanning bed avoidance)  - monitor for ABCDE of melanoma; advised to bring any lesions of concern (new or changing over time) to medical attention    # History of NMSC. No evidence of recurrent disease.  - Continue photoprotection - recommend SPF 30 or higher with frequent reapplication  - Continue yearly skin exams  - Advised to monitor for changing, non-healing, bleeding, painful, changing, or otherwise symptomatic lesions    Procedures Performed:   None       Follow-up: 6 month(s) in-person, or earlier for new or changing lesions    Staff and scribe:    Scribe Disclosure:   I, HELEN URIOSTEGUI, am serving as a scribe; to document services personally performed by Azul Rendon MD -based on data collection and the provider's statements to me.     .bssc  ____________________________________________    CC: Skin Check      Reviewed patients past medical history and pertinent chart review prior to patient's visit today.     HPI:  Ms. Ree Batres is a 49 year old female who presents today as a return patient for [place].     Today patient reported that 100 mg of Gabapentin is working really well for her.     Denied any spots of concern.     Has a hx of MIS and NMSC    Patient is otherwise feeling well, without additional concerns.    Labs:  N/A    Physical Exam:  Vitals: There were no vitals taken for this visit.  SKIN: Total skin excluding the undergarment areas was performed. The exam included the head/face, neck, both arms, chest, back, abdomen, both legs, digits and/or nails.    - - There are dome shaped bright red papules on the trunk and extremities .   - Multiple regular brown pigmented macules and papules are identified on the trunk and extremities. .   - Scattered brown macules on sun exposed areas.  - Waxy stuck on papules and plaques on trunk and extremities.    -There  are well healed surgical scars without erythema, nodularity or telangiectasias on the prior MIS and NMSC sites, NERD  - R lower abdomen C shape brown macule measuring 3x3 mm. Dermoscopy with even reticular network  - No other lesions of concern on areas examined.   LYMPH: No cervical, supraclavicular, axillary, or inguinal lymphadenopathy.    Medications:  Current Outpatient Medications   Medication Sig Dispense Refill     clobetasol (TEMOVATE) 0.05 % external solution APPLY TOPICALLY TO THE AFFECTED AREA TWICE DAILY 50 mL 2     clobetasol (TEMOVATE) 0.05 % external solution Apply topically 2 times daily 50 mL 11     EPINEPHrine (ANY BX GENERIC EQUIV) 0.3 MG/0.3ML injection 2-pack Inject 0.3 mLs (0.3 mg) into the muscle once as needed for anaphylaxis May repeat one time in 5-15 minutes if response to initial dose is inadequate. 2 each 0     gabapentin (NEURONTIN) 100 MG capsule Take 1 capsule (100 mg) by mouth 3 times daily 90 capsule 3     ketoconazole (NIZORAL) 2 % external shampoo Apply topically daily as needed for itching or irritation 120 mL 10     predniSONE (DELTASONE) 20 MG tablet Take two tablets (= 40mg) each day for 5 (five) days 10 tablet 0     triamcinolone (KENALOG) 0.1 % external cream Apply topically 2 times daily as instructed. 80 g 1     No current facility-administered medications for this visit.      Past Medical/Surgical History:   Patient Active Problem List   Diagnosis     History of basal cell cancer     ACP (advance care planning)     Elevated blood-pressure reading without diagnosis of hypertension     Past Medical History:   Diagnosis Date     Basal cell carcinoma of skin      H pylori ulcer     resolved     Malignant melanoma (H)      Renal disease     kidney stone in past/kidney infections                        Again, thank you for allowing me to participate in the care of your patient.        Sincerely,        Azul Rendon MD

## 2024-07-30 NOTE — PROGRESS NOTES
[facility] Dermatology Note  Encounter Date: Jul 30, 2024  Office Visit      Dermatology Problem List:  FBSE: 7/30/24    1. MIS, L paraspinal lower back, S/p excision 7/12/23  2. NMSC  - BCC on right hand and left lower breast~1999  3. DN:  -  Lentiginous compound melanocytic nevus with mild atypia, L medial posterior shoulder, Bx proven on 4/30/24  - Mild DN, R paraspinal mid back, s/p shave bx 4/4/2023  - Lentiginous compound melanocytic nevus with mild atypia, R lateral mid back, S/p Biopsy 10/13/23  4. 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 Eczematous dermatitis   - TMC 0.1% ointment  6. Benign bx:   - Intradermal melanocytic nevus, L inguinal fold, s/p shave bx 4/4/2023   7. Allodynia after severe burn on her lower legs related to LHR.   - Tx: Gabapentin 100 mg nightly    #.Lesions to Monitor  - Left medial posterior shoulder, Photographed 10/13/23. Unchanged from prior.        Soc hx: son Luis, aged 9.   ____________________________________________    Assessment & Plan:    #Allodynia after severe burn on her lower legs related to LHR. Tolerating Gabapentin 100 nightly and this has made a huge difference!  - Continue use of gabapentin 100 mg nightly.     # Multiple benign nevi.   # H/o DN.  - Monitor for ABCDEs of melanoma   - Continue sun protection - recommend SPF 30 or higher with frequent application   - Return sooner if noticing changing or symptomatic lesions    # Benign lesions - SKs, cherry angiomas, lentigenes.  - No treatment required    # History of melanoma.  - no evidence of recurrent disease via inspection or palpation; all sites well-healed  - lymph node exam negative for lymphadenopathy  - continue photoprotection (such as SPF30+ sunscreen and proper use, UPF clothing, sun avoidance, tanning bed avoidance)  - monitor for ABCDE of melanoma; advised to bring any lesions of concern (new or  changing over time) to medical attention    # History of NMSC. No evidence of recurrent disease.  - Continue photoprotection - recommend SPF 30 or higher with frequent reapplication  - Continue yearly skin exams  - Advised to monitor for changing, non-healing, bleeding, painful, changing, or otherwise symptomatic lesions    Procedures Performed:   None       Follow-up: 6 month(s) in-person, or earlier for new or changing lesions    Staff and scribe:    Scribe Disclosure:   I, HELEN URIOSTEGUI, am serving as a scribe; to document services personally performed by Azul Rendon MD -based on data collection and the provider's statements to me.     .bssc  ____________________________________________    CC: Skin Check      Reviewed patients past medical history and pertinent chart review prior to patient's visit today.     HPI:  Ms. Ree Batres is a 49 year old female who presents today as a return patient for [facility] – [place], [place].     Today patient reported that 100 mg of Gabapentin is working really well for her.     Denied any spots of concern.     Has a hx of MIS and NMSC    Patient is otherwise feeling well, without additional concerns.    Labs:  N/A    Physical Exam:  Vitals: There were no vitals taken for this visit.  SKIN: Total skin excluding the undergarment areas was performed. The exam included the head/face, neck, both arms, chest, back, abdomen, both legs, digits and/or nails.    - - There are dome shaped bright red papules on the trunk and extremities .   - Multiple regular brown pigmented macules and papules are identified on the trunk and extremities. .   - Scattered brown macules on sun exposed areas.  - Waxy stuck on papules and plaques on trunk and extremities.    -There are well healed surgical scars without erythema, nodularity or telangiectasias on the prior MIS and NMSC sites, NERD  - R lower abdomen C shape brown macule measuring 3x3 mm. Dermoscopy with even reticular network  - No other lesions of  concern on areas examined.   LYMPH: No cervical, supraclavicular, axillary, or inguinal lymphadenopathy.    Medications:  Current Outpatient Medications   Medication Sig Dispense Refill    clobetasol (TEMOVATE) 0.05 % external solution APPLY TOPICALLY TO THE AFFECTED AREA TWICE DAILY 50 mL 2    clobetasol (TEMOVATE) 0.05 % external solution Apply topically 2 times daily 50 mL 11    EPINEPHrine (ANY BX GENERIC EQUIV) 0.3 MG/0.3ML injection 2-pack Inject 0.3 mLs (0.3 mg) into the muscle once as needed for anaphylaxis May repeat one time in 5-15 minutes if response to initial dose is inadequate. 2 each 0    gabapentin (NEURONTIN) 100 MG capsule Take 1 capsule (100 mg) by mouth 3 times daily 90 capsule 3    ketoconazole (NIZORAL) 2 % external shampoo Apply topically daily as needed for itching or irritation 120 mL 10    predniSONE (DELTASONE) 20 MG tablet Take two tablets (= 40mg) each day for 5 (five) days 10 tablet 0    triamcinolone (KENALOG) 0.1 % external cream Apply topically 2 times daily as instructed. 80 g 1     No current facility-administered medications for this visit.      Past Medical/Surgical History:   Patient Active Problem List   Diagnosis    History of basal cell cancer    ACP (advance care planning)    Elevated blood-pressure reading without diagnosis of hypertension     Past Medical History:   Diagnosis Date    Basal cell carcinoma of skin     H pylori ulcer     resolved    Malignant melanoma (H)     Renal disease     kidney stone in past/kidney infections

## 2024-09-20 ENCOUNTER — MYC MEDICAL ADVICE (OUTPATIENT)
Dept: DERMATOLOGY | Facility: CLINIC | Age: 49
End: 2024-09-20
Payer: COMMERCIAL

## 2024-09-26 NOTE — TELEPHONE ENCOUNTER
Called Mom a message was left on voicemail explaining the need to reach out via calling our call center [phone]. To schedule her child to be seen in Pediatric Dermatology.    Thanks,  Kizzy

## 2024-10-11 ENCOUNTER — HOSPITAL ENCOUNTER (OUTPATIENT)
Dept: MAMMOGRAPHY | Facility: CLINIC | Age: 49
Discharge: HOME | End: 2024-10-11
Attending: OBSTETRICS & GYNECOLOGY | Admitting: OBSTETRICS & GYNECOLOGY
Payer: COMMERCIAL

## 2024-10-11 DIAGNOSIS — Z12.31 VISIT FOR SCREENING MAMMOGRAM: ICD-10-CM

## 2024-10-11 PROCEDURE — 77063 BREAST TOMOSYNTHESIS BI: CPT

## 2024-10-25 NOTE — PROGRESS NOTES
[facility] Dermatology Note  Encounter Date: Oct 29, 2024  Office Visit      Dermatology Problem List:  FBSE: 10/29/2024    # NUB, L posterior calf, 2 toned brown macule  - s/p shave bx 10/29/2024  # NUB, R lateral flank, slightly irregular brown macule.   -s/p shave bx 10/29/2024    1. MIS, L paraspinal lower back, S/p excision 7/12/23  2. NMSC  - BCC on right hand and left lower breast~1999  3. DN:  -  Lentiginous compound melanocytic nevus with mild atypia, L medial posterior shoulder, Bx proven on 4/30/24  - Mild DN, R paraspinal mid back, s/p shave bx 4/4/2023  - Lentiginous compound melanocytic nevus with mild atypia, R lateral mid back, S/p Biopsy 10/13/23  4. 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 Eczematous dermatitis   - TMC 0.1% ointment  6. Benign bx:   - Intradermal melanocytic nevus, L inguinal fold, s/p shave bx 4/4/2023   7. Allodynia after severe burn on her lower legs related to LHR.   - Tx: Gabapentin 100 mg nightly  8. inguinal lymphadenopathy    # Lesions to Monitor  - Left medial posterior shoulder, Photographed 10/13/23. Unchanged from prior.        Soc hx: son Luis, aged 10.   ____________________________________________    Assessment & Plan:    # NUB, L posterior calf, 2 toned brown macule  - Shave biopsy performed today. See procedure note below.      # NUB, R lateral flank, slightly irregular brown macule.   - Shave biopsy performed today. See procedure note below.     # slightly hypertrophic scar at site of melanoma excision  -patient is noting more sensitivity, scar is slightly hypertrophic, can consider ILK or more gabapentin, pt defers for now.     # inguinal lymphadenopathy  - Feels benign, will get an ultrasound to characterize due to hx of melanoma     #Allodynia after severe burn on her lower legs related to LHR. Tolerating Gabapentin 100 nightly and this has made a huge  difference!  - Continue use of gabapentin 100 mg nightly. Advised ok to increase as tolerated such as to 200-300 mg nightly if needed.    # Multiple benign nevi.   - Monitor for ABCDEs of melanoma   - Continue sun protection - recommend SPF 30 or higher with frequent application   - Return sooner if noticing changing or symptomatic lesions     # Benign lesions - SKs, cherry angiomas, lentigenes.  - No treatment required     # History of melanoma.  - no evidence of recurrent disease via inspection or palpation; all sites well-healed  - lymph node exam negative for lymphadenopathy  - continue photoprotection (such as SPF30+ sunscreen and proper use, UPF clothing, sun avoidance, tanning bed avoidance)  - monitor for ABCDE of melanoma; advised to bring any lesions of concern (new or changing over time) to medical attention    # History of NMSC. No evidence of recurrent disease.  - Continue photoprotection - recommend SPF 30 or higher with frequent reapplication  - Continue yearly skin exams  - Advised to monitor for changing, non-healing, bleeding, painful, changing, or otherwise symptomatic lesions    Procedures Performed:   - Shave biopsy: After discussion of benefits and risks including but not limited to bleeding, infection, scar, incomplete removal, recurrence, and non-diagnostic biopsy, written consent and photographs were obtained. The area was cleaned with isopropyl alcohol. < 1mL of 1% lidocaine with epinephrine was injected to obtain adequate anesthesia. A shave biopsy was performed. Hemostasis was achieved with aluminium chloride. Vaseline and a sterile dressing were applied. The patient tolerated the procedure and no complications were noted. The patient was provided with verbal and written post care instructions.         Follow-up: 01/28/2025 as scheduled in-person, or earlier for new or changing lesions    Staff and scribe:  Scribe Disclosure:   Ayala YANEZ, am serving as a scribe; to document  services personally performed by Azul Rendon MD -based on data collection and the provider's statements to me.     Provider Disclosure:   The documentation recorded by the scribe accurately reflects the services I personally performed and the decisions made by me.    Azul Rendon MD    Department of Dermatology  [facility]: Phone: [phone], Fax: [phone]  10/30/2024       ____________________________________________    CC: Skin Check (/FBSC/Hx of melanoma/)      Reviewed patients past medical history and pertinent chart review prior to patient's visit today.     HPI:  Ms. Ree Batres is a 49 year old female who presents today as a return patient for [facility].     No spots of concern today.     Patient is otherwise feeling well, without additional concerns.    Labs:  N/A    Physical exam:  Vitals: There were no vitals taken for this visit.  GEN: This is a well developed, well-nourished female in no acute distress, in a pleasant mood.    SKIN: Total skin excluding the undergarment areas was performed. The exam included the head/face, neck, both arms, chest, back, abdomen, both legs, digits and/or nails.   - melanoma scar is well healed, slightly hypertrophic  - L posterior calf, 2 toned brown macule  - R lateral flank, slightly irregular brown macule  - There are dome shaped bright red papules on the trunk and extremities .   - Multiple regular brown pigmented macules and papules are identified on the trunk and extremities. .   - Scattered brown macules on sun exposed areas.  - Waxy stuck on papules and plaques on trunk and extremities.    - No other lesions of concern on areas examined.   LYMPH: No cervical, supraclavicular, axillary lymphadenopathy.   - L side, shotty soft mobile inguinal lymphadenopathy.         Medications:  Current Outpatient Medications   Medication Sig Dispense Refill    clobetasol  (TEMOVATE) 0.05 % external solution APPLY TOPICALLY TO THE AFFECTED AREA TWICE DAILY 50 mL 2    clobetasol (TEMOVATE) 0.05 % external solution Apply topically 2 times daily 50 mL 11    EPINEPHrine (ANY BX GENERIC EQUIV) 0.3 MG/0.3ML injection 2-pack Inject 0.3 mLs (0.3 mg) into the muscle once as needed for anaphylaxis May repeat one time in 5-15 minutes if response to initial dose is inadequate. 2 each 0    gabapentin (NEURONTIN) 100 MG capsule Take 1 capsule (100 mg) by mouth at bedtime 90 capsule 3    ketoconazole (NIZORAL) 2 % external shampoo Apply topically daily as needed for itching or irritation 120 mL 10    triamcinolone (KENALOG) 0.1 % external cream Apply topically 2 times daily as instructed. 80 g 1    predniSONE (DELTASONE) 20 MG tablet Take two tablets (= 40mg) each day for 5 (five) days (Patient not taking: Reported on 10/29/2024) 10 tablet 0     No current facility-administered medications for this visit.      Past Medical/Surgical History:   Patient Active Problem List   Diagnosis    History of basal cell cancer    ACP (advance care planning)    Elevated blood-pressure reading without diagnosis of hypertension     Past Medical History:   Diagnosis Date    Basal cell carcinoma of skin     H pylori ulcer     resolved    Malignant melanoma (H)     Renal disease     kidney stone in past/kidney infections

## 2024-10-29 ENCOUNTER — OFFICE VISIT (OUTPATIENT)
Dept: DERMATOLOGY | Facility: CLINIC | Age: 49
End: 2024-10-29
Payer: COMMERCIAL

## 2024-10-29 DIAGNOSIS — Z85.828 HISTORY OF NONMELANOMA SKIN CANCER: ICD-10-CM

## 2024-10-29 DIAGNOSIS — R59.0 INGUINAL LYMPHADENOPATHY: ICD-10-CM

## 2024-10-29 DIAGNOSIS — L91.0 HYPERTROPHIC SCAR: ICD-10-CM

## 2024-10-29 DIAGNOSIS — L81.4 SOLAR LENTIGO: ICD-10-CM

## 2024-10-29 DIAGNOSIS — D22.9 MULTIPLE BENIGN NEVI: ICD-10-CM

## 2024-10-29 DIAGNOSIS — D49.2 NEOPLASM OF UNSPECIFIED BEHAVIOR OF BONE, SOFT TISSUE, AND SKIN: ICD-10-CM

## 2024-10-29 DIAGNOSIS — Z85.820 HISTORY OF MELANOMA: Primary | ICD-10-CM

## 2024-10-29 DIAGNOSIS — L82.1 SEBORRHEIC KERATOSES: ICD-10-CM

## 2024-10-29 DIAGNOSIS — Z12.83 SKIN CANCER SCREENING: ICD-10-CM

## 2024-10-29 DIAGNOSIS — D18.01 CHERRY ANGIOMA: ICD-10-CM

## 2024-10-29 PROCEDURE — 11102 TANGNTL BX SKIN SINGLE LES: CPT | Performed by: DERMATOLOGY

## 2024-10-29 PROCEDURE — 99213 OFFICE O/P EST LOW 20 MIN: CPT | Mod: 25 | Performed by: DERMATOLOGY

## 2024-10-29 PROCEDURE — 88305 TISSUE EXAM BY PATHOLOGIST: CPT | Performed by: DERMATOLOGY

## 2024-10-29 PROCEDURE — 11103 TANGNTL BX SKIN EA SEP/ADDL: CPT | Performed by: DERMATOLOGY

## 2024-10-29 NOTE — PATIENT INSTRUCTIONS
Checking for Skin Cancer  You can help find cancer early by checking your skin each month. There are 3 main kinds of skin cancer: melanoma, basal cell carcinoma, and squamous cell carcinoma. Doing monthly skin checks is the best way to find new marks, sores, or skin changes. Follow these instructions for checking your skin.   The ABCDEs of checking moles for melanoma   Check your moles or growths for signs of melanoma using ABCDE:   Asymmetry: The sides of the mole or growth don t match.  Border: The edges are ragged, notched, or blurred.  Color: The color within the mole or growth varies. It could be black, brown, tan, white, or shades of red, gray, or blue.  Diameter: The mole or growth is larger than   inch or 6 mm (size of a pencil eraser).  Evolving: The size, shape, texture, or color of the mole or growth is changing.     ABCDE's of moles on light skin.        ABCDE's of moles on dark skin may be harder to identify.     Checking for other types of skin cancer  Basal cell carcinoma or squamous cell carcinoma cause symptoms like:     A spot or mole that looks different from all other marks on your skin  Changes in how an area feels, such as itching, tenderness, or pain  Changes in the skin's surface, such as oozing, bleeding, or scaliness  A sore that doesn't heal  New swelling, redness, or spread of color beyond the border of a mole    Who s at risk?  Anyone of any skin color can get skin cancer. But you're at greater risk if you have:   Fair skin that freckles easily and burns instead of tanning  Light-colored or red hair  Light-colored eyes  Many moles or abnormal moles on your skin  A long history of unprotected exposure to sunlight or tanning beds  A history of many blistering sunburns as a child or teen  A family history of skin cancer  Been exposed to radiation or chemicals  A weakened immune system  Been exposed to arsenic  If you've had skin cancer in the past, you're at high risk of having it again.    How to check your skin  Do your monthly skin checkups in front of a full-length mirror. Use a room with good lighting so it's easier to see. Use a hand mirror to look at hard-to-see places like your buttocks and back. You can also have a trusted friend or family member help you with these checks. Check every part of your body, including your:   Head (ears, face, neck, and scalp)  Torso (front, back, sides, and under breasts)  Arms (tops, undersides, and armpits)  Hands (palms, backs, and fingers, including under the nails)  Lower back, buttocks, and genitals  Legs (front, back, and sides)  Feet (tops, soles, toes, including under the nails, and between toes)  Watch for new spots on your skin or a spot that's changing in color, shape, size.   If you have a lot of moles, take digital photos of them each month. Make sure to take photos both up close and from a distance. These can help you see if any moles change over time.   Know your skin  Most skin changes aren't cancer. But if you see any changes in your skin, call your healthcare provider right away. Only they can tell you if a change is a problem. If you have skin cancer, seeing your provider can be the first step to getting the treatment that could save your life.   Charles last reviewed this educational content on 10/1/2021    8479-7037 [employer]. All rights reserved. This information is not intended as a substitute for professional medical care. Always follow your healthcare professional's instructions.     Wound Care After a Biopsy    What is a skin biopsy?  A skin biopsy allows the doctor to examine a very small piece of tissue under the microscope to determine the diagnosis and the best treatment for the skin condition. A local anesthetic (numbing medicine) is injected with a very small needle into the skin area to be tested. A small piece of skin is taken from the area. Sometimes a suture (stitch) is used.     What are the risks of a skin  biopsy?  I will experience scar, bleeding, swelling, pain, crusting and redness. I may experience incomplete removal or recurrence. Risks of this procedure are excessive bleeding, bruising, infection, nerve damage, numbness, thick (hypertrophic or keloidal) scar and non-diagnostic biopsy.    How should I care for my wound for the first 24 hours?  Keep the wound dry and covered for 24 hours  If it bleeds, hold direct pressure on the area for 15 minutes. If bleeding does not stop, call us or go to the emergency room  Avoid strenuous exercise the first 1-2 days or as your doctor instructs you    How should I care for the wound after 24 hours?  After 24 hours, remove the bandage  You may bathe or shower as normal  If you had a scalp biopsy, you can shampoo as usual and can use shower water to clean the biopsy site daily  Clean the wound once a day with gentle soap and water  Do not scrub, be gentle  Apply white petroleum/Vaseline after cleaning the wound with a cotton swab or a clean finger, and keep the site covered with a Bandaid /bandage. Bandages are not necessary with a scalp biopsy  If you are unable to cover the site with a Bandaid /bandage, re-apply ointment 2-3 times a day to keep the site moist. Moisture will help with healing  Avoid strenuous activity for first 1-2 days  Avoid lakes, rivers, pools, and oceans until the stitches are removed or the site is healed    How do I clean my wound?  Wash hands thoroughly with soap or use hand  before all wound care  Clean the wound with gentle soap and water  Apply white petroleum/Vaseline  to wound after it is clean  Replace the Bandaid /bandage to keep the wound covered for the first few days or as instructed by your doctor  If you had a scalp biopsy, warm shower water to the area on a daily basis should suffice    What should I use to clean my wound?   Cotton-tipped applicators (Qtips )  White petroleum jelly (Vaseline ). Use a clean new container and use  Q-tips to apply.  Bandaids  as needed  Gentle soap     How should I care for my wound long term?  Do not get your wound dirty  Keep up with wound care for one week or until the area is healed.  A small scab will form and fall off by itself when the area is completely healed. The area will be red and will become pink in color as it heals. Sun protection is very important for how your scar will turn out. Sunscreen with an SPF 30 or greater is recommended once the area is healed.  You should have some soreness but it should be mild and slowly go away over several days. Talk to your doctor about using tylenol for pain,    When should I call my doctor?  If you have increased:   Pain or swelling  Pus or drainage (clear or slightly yellow drainage is ok)  Temperature over 100F  Spreading redness or warmth around wound    When will I hear about my results?  The biopsy results can take 2 weeks to come back.  Your results will automatically release to [employer] before your provider has even reviewed them.  The clinic will call you with the results, send you a [employer] message, or have you schedule a follow-up clinic or phone time to discuss the results.  Contact our clinics if you do not hear from us in 2 weeks.    Who should I call with questions?  [facility]: [phone]  [facility]: [phone]  For urgent needs outside of business hours call the [facility] at [phone] and ask for the dermatology resident on call     Proper skin care from [facility]:    -Eliminate harsh soaps as they strip the natural oils from the skin, often resulting in dry itchy skin ( i.e. Dial, Zest, Mosotho Spring)  -Use mild soaps such as Cetaphil or Dove Sensitive Skin in the shower. You do not need to use soap on arms, legs, and trunk every time you shower unless visibly soiled.   -Avoid hot or cold showers.  -After showering, lightly dry off and apply moisturizing  within 2-3 minutes. This will help trap moisture in the skin.   -Aggressive use of a moisturizer at least 1-2 times a day to the entire body (including -Vanicream, Cetaphil, Aquaphor or Cerave) and moisturize hands after every washing.  -We recommend using moisturizers that come in a tub that needs to be scooped out, not a pump. This has more of an oil base. It will hold moisture in your skin much better than a water base moisturizer. The above recommended are non-pore clogging.      Wear a sunscreen with at least SPF 30 on your face, ears, neck and V of the chest daily. Wear sunscreen on other areas of the body if those areas are exposed to the sun throughout the day. Sunscreens can contain physical and/or chemical blockers. Physical blockers are less likely to clog pores, these include zinc oxide and titanium dioxide. Reapply every two hour and after swimming.     Sunscreen examples: https://www.ewg.org/sunscreen/    UV radiation  UVA radiation remains constant throughout the day and throughout the year. It is a longer wavelength than UVB and therefore penetrates deeper into the skin leading to immediate and delayed tanning, photoaging, and skin cancer. 70-80% of UVA and UVB radiation occurs between the hours of 10am-2pm.  UVB radiation  UVB radiation causes the most harmful effects and is more significant during the summer months. However, snow and ice can reflect UVB radiation leading to skin damage during the winter months as well. UVB radiation is responsible for tanning, burning, inflammation, delayed erythema (pinkness), pigmentation (brown spots), and skin cancer.     I recommend self monthly full body exams and yearly full body exams with a dermatology provider. If you develop a new or changing lesion please follow up for examination. Most skin cancers are pink and scaly or pink and pearly. However, we do see blue/brown/black skin cancers.  Consider the ABCDEs of melanoma when giving yourself your monthly  full body exam ( don't forget the groin, buttocks, feet, toes, etc). A-asymmetry, B-borders, C-color, D-diameter, E-elevation or evolving. If you see any of these changes please follow up in clinic. If you cannot see your back I recommend purchasing a hand held mirror to use with a larger wall mirror.       Checking for Skin Cancer  You can find cancer early by checking your skin each month. There are 3 kinds of skin cancer. They are melanoma, basal cell carcinoma, and squamous cell carcinoma. Doing monthly skin checks is the best way to find new marks or skin changes. Follow the instructions below for checking your skin.   The ABCDEs of checking moles for melanoma   Check your moles or growths for signs of melanoma using ABCDE:   Asymmetry: the sides of the mole or growth don t match  Border: the edges are ragged, notched, or blurred  Color: the color within the mole or growth varies  Diameter: the mole or growth is larger than 6 mm (size of a pencil eraser)  Evolving: the size, shape, or color of the mole or growth is changing (evolving is not shown in the images below)    Checking for other types of skin cancer  Basal cell carcinoma or squamous cell carcinoma have symptoms such as:     A spot or mole that looks different from all other marks on your skin  Changes in how an area feels, such as itching, tenderness, or pain  Changes in the skin's surface, such as oozing, bleeding, or scaliness  A sore that does not heal  New swelling or redness beyond the border of a mole    Who s at risk?  Anyone can get skin cancer. But you are at greater risk if you have:   Fair skin, light-colored hair, or light-colored eyes  Many moles or abnormal moles on your skin  A history of sunburns from sunlight or tanning beds  A family history of skin cancer  A history of exposure to radiation or chemicals  A weakened immune system  If you have had skin cancer in the past, you are at risk for recurring skin cancer.   How to check your  skin  Do your monthly skin checkups in front of a full-length mirror. Check all parts of your body, including your:   Head (ears, face, neck, and scalp)  Torso (front, back, and sides)  Arms (tops, undersides, upper, and lower armpits)  Hands (palms, backs, and fingers, including under the nails)  Buttocks and genitals  Legs (front, back, and sides)  Feet (tops, soles, toes, including under the nails, and between toes)  If you have a lot of moles, take digital photos of them each month. Make sure to take photos both up close and from a distance. These can help you see if any moles change over time.   Most skin changes are not cancer. But if you see any changes in your skin, call your doctor right away. Only he or she can diagnose a problem. If you have skin cancer, seeing your doctor can be the first step toward getting the treatment that could save your life.   [employer] last reviewed this educational content on 4/1/2019 2000-2020 [employer]. [address] [place] [address]. All rights reserved. This information is not intended as a substitute for professional medical care. Always follow your healthcare professional's instructions.       When should I call my doctor?  If you are worsening or not improving, please, contact us or seek urgent care as noted below.     Who should I call with questions (adults)?    [facility] [phone]  For urgent needs outside of business hours call the [facility] at [phone] and ask for the dermatology resident on call to be paged  If this is a medical emergency and you are unable to reach an ER, Call [phone]      If you need a prescription refill, please contact your pharmacy. Refills are approved or denied by our Physicians during normal business hours, Monday through Fridays  Per office policy, refills will not be granted if you have not been seen within the past year (or sooner depending on your child's  condition)   no weight-bearing restrictions

## 2024-10-29 NOTE — LETTER
10/29/2024      Ree Batres  [address] [facility] [place] [address]      Dear Colleague,    Thank you for referring your patient, Ree Batres, to the [facility]. Please see a copy of my visit note below.    [facility] Dermatology Note  Encounter Date: Oct 29, 2024  Office Visit      Dermatology Problem List:  FBSE: 10/29/2024    # NUB, L posterior calf, 2 toned brown macule  - s/p shave bx 10/29/2024  # NUB, R lateral flank, slightly irregular brown macule.   -s/p shave bx 10/29/2024    1. MIS, L paraspinal lower back, S/p excision 7/12/23  2. NMSC  - BCC on right hand and left lower breast~1999  3. DN:  -  Lentiginous compound melanocytic nevus with mild atypia, L medial posterior shoulder, Bx proven on 4/30/24  - Mild DN, R paraspinal mid back, s/p shave bx 4/4/2023  - Lentiginous compound melanocytic nevus with mild atypia, R lateral mid back, S/p Biopsy 10/13/23  4. 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 Eczematous dermatitis   - TMC 0.1% ointment  6. Benign bx:   - Intradermal melanocytic nevus, L inguinal fold, s/p shave bx 4/4/2023   7. Allodynia after severe burn on her lower legs related to LHR.   - Tx: Gabapentin 100 mg nightly  8. inguinal lymphadenopathy    # Lesions to Monitor  - Left medial posterior shoulder, Photographed 10/13/23. Unchanged from prior.        Soc hx: son Luis, aged 10.   ____________________________________________    Assessment & Plan:    # NUB, L posterior calf, 2 toned brown macule  - Shave biopsy performed today. See procedure note below.      # NUB, R lateral flank, slightly irregular brown macule.   - Shave biopsy performed today. See procedure note below.     # slightly hypertrophic scar at site of melanoma excision  -patient is noting more sensitivity, scar is slightly hypertrophic, can consider ILK or more gabapentin, pt defers for  now.     # inguinal lymphadenopathy  - Feels benign, will get an ultrasound to characterize due to hx of melanoma     #Allodynia after severe burn on her lower legs related to LHR. Tolerating Gabapentin 100 nightly and this has made a huge difference!  - Continue use of gabapentin 100 mg nightly. Advised ok to increase as tolerated such as to 200-300 mg nightly if needed.    # Multiple benign nevi.   - Monitor for ABCDEs of melanoma   - Continue sun protection - recommend SPF 30 or higher with frequent application   - Return sooner if noticing changing or symptomatic lesions     # Benign lesions - SKs, cherry angiomas, lentigenes.  - No treatment required     # History of melanoma.  - no evidence of recurrent disease via inspection or palpation; all sites well-healed  - lymph node exam negative for lymphadenopathy  - continue photoprotection (such as SPF30+ sunscreen and proper use, UPF clothing, sun avoidance, tanning bed avoidance)  - monitor for ABCDE of melanoma; advised to bring any lesions of concern (new or changing over time) to medical attention    # History of NMSC. No evidence of recurrent disease.  - Continue photoprotection - recommend SPF 30 or higher with frequent reapplication  - Continue yearly skin exams  - Advised to monitor for changing, non-healing, bleeding, painful, changing, or otherwise symptomatic lesions    Procedures Performed:   - Shave biopsy: After discussion of benefits and risks including but not limited to bleeding, infection, scar, incomplete removal, recurrence, and non-diagnostic biopsy, written consent and photographs were obtained. The area was cleaned with isopropyl alcohol. < 1mL of 1% lidocaine with epinephrine was injected to obtain adequate anesthesia. A shave biopsy was performed. Hemostasis was achieved with aluminium chloride. Vaseline and a sterile dressing were applied. The patient tolerated the procedure and no complications were noted. The patient was provided with  verbal and written post care instructions.         Follow-up: 01/28/2025 as scheduled in-person, or earlier for new or changing lesions    Staff and scribe:  Scribe Disclosure:   I, Ayala Dyana, am serving as a scribe; to document services personally performed by Azul Rendon MD -based on data collection and the provider's statements to me.     Provider Disclosure:   The documentation recorded by the scribe accurately reflects the services I personally performed and the decisions made by me.    Azul Rendon MD    Department of Dermatology  [facility] [place]: Phone: [phone], Fax: [phone]  10/30/2024       ____________________________________________    CC: Skin Check (/FBSC/Hx of melanoma/)      Reviewed patients past medical history and pertinent chart review prior to patient's visit today.     HPI:  Ms. Ree Batres is a 49 year old female who presents today as a return patient for FBSC.     No spots of concern today.     Patient is otherwise feeling well, without additional concerns.    Labs:  N/A    Physical exam:  Vitals: There were no vitals taken for this visit.  GEN: This is a well developed, well-nourished female in no acute distress, in a pleasant mood.    SKIN: Total skin excluding the undergarment areas was performed. The exam included the head/face, neck, both arms, chest, back, abdomen, both legs, digits and/or nails.   - melanoma scar is well healed, slightly hypertrophic  - L posterior calf, 2 toned brown macule  - R lateral flank, slightly irregular brown macule  - There are dome shaped bright red papules on the trunk and extremities .   - Multiple regular brown pigmented macules and papules are identified on the trunk and extremities. .   - Scattered brown macules on sun exposed areas.  - Waxy stuck on papules and plaques on trunk and extremities.    - No other lesions of concern on areas  examined.   LYMPH: No cervical, supraclavicular, axillary lymphadenopathy.   - L side, shotty soft mobile inguinal lymphadenopathy.         Medications:  Current Outpatient Medications   Medication Sig Dispense Refill     clobetasol (TEMOVATE) 0.05 % external solution APPLY TOPICALLY TO THE AFFECTED AREA TWICE DAILY 50 mL 2     clobetasol (TEMOVATE) 0.05 % external solution Apply topically 2 times daily 50 mL 11     EPINEPHrine (ANY BX GENERIC EQUIV) 0.3 MG/0.3ML injection 2-pack Inject 0.3 mLs (0.3 mg) into the muscle once as needed for anaphylaxis May repeat one time in 5-15 minutes if response to initial dose is inadequate. 2 each 0     gabapentin (NEURONTIN) 100 MG capsule Take 1 capsule (100 mg) by mouth at bedtime 90 capsule 3     ketoconazole (NIZORAL) 2 % external shampoo Apply topically daily as needed for itching or irritation 120 mL 10     triamcinolone (KENALOG) 0.1 % external cream Apply topically 2 times daily as instructed. 80 g 1     predniSONE (DELTASONE) 20 MG tablet Take two tablets (= 40mg) each day for 5 (five) days (Patient not taking: Reported on 10/29/2024) 10 tablet 0     No current facility-administered medications for this visit.      Past Medical/Surgical History:   Patient Active Problem List   Diagnosis     History of basal cell cancer     ACP (advance care planning)     Elevated blood-pressure reading without diagnosis of hypertension     Past Medical History:   Diagnosis Date     Basal cell carcinoma of skin      H pylori ulcer     resolved     Malignant melanoma (H)      Renal disease     kidney stone in past/kidney infections                        Again, thank you for allowing me to participate in the care of your patient.        Sincerely,        Azul Rendon MD

## 2024-10-30 ENCOUNTER — HOSPITAL ENCOUNTER (OUTPATIENT)
Dept: ULTRASOUND IMAGING | Facility: CLINIC | Age: 49
Discharge: HOME | End: 2024-10-30
Attending: DERMATOLOGY | Admitting: DERMATOLOGY
Payer: COMMERCIAL

## 2024-10-30 DIAGNOSIS — R59.0 INGUINAL LYMPHADENOPATHY: ICD-10-CM

## 2024-10-30 PROCEDURE — 76882 US LMTD JT/FCL EVL NVASC XTR: CPT | Mod: RT

## 2024-12-09 NOTE — PATIENT INSTRUCTIONS
Patient Education     Checking for Skin Cancer  You can find cancer early by checking your skin each month. There are 3 kinds of skin cancer. They are melanoma, basal cell carcinoma, and squamous cell carcinoma. Doing monthly skin checks is the best way to find new marks or skin changes. Follow the instructions below for checking your skin.   The ABCDEs of checking moles for melanoma   Check your moles or growths for signs of melanoma using ABCDE:   Asymmetry: the sides of the mole or growth don t match  Border: the edges are ragged, notched, or blurred  Color: the color within the mole or growth varies  Diameter: the mole or growth is larger than 6 mm (size of a pencil eraser)  Evolving: the size, shape, or color of the mole or growth is changing (evolving is not shown in the images below)    Checking for other types of skin cancer  Basal cell carcinoma or squamous cell carcinoma have symptoms such as:     A spot or mole that looks different from all other marks on your skin  Changes in how an area feels, such as itching, tenderness, or pain  Changes in the skin's surface, such as oozing, bleeding, or scaliness  A sore that does not heal  New swelling or redness beyond the border of a mole    Who s at risk?  Anyone can get skin cancer. But you are at greater risk if you have:   Fair skin, light-colored hair, or light-colored eyes  Many moles or abnormal moles on your skin  A history of sunburns from sunlight or tanning beds  A family history of skin cancer  A history of exposure to radiation or chemicals  A weakened immune system  If you have had skin cancer in the past, you are at risk for recurring skin cancer.   How to check your skin  Do your monthly skin checkups in front of a full-length mirror. Check all parts of your body, including your:   Head (ears, face, neck, and scalp)  Torso (front, back, and sides)  Arms (tops, undersides, upper, and lower armpits)  Hands (palms, backs, and fingers, including  Pt was messaged regarding update.     Maxi Jackson LPN     under the nails)  Buttocks and genitals  Legs (front, back, and sides)  Feet (tops, soles, toes, including under the nails, and between toes)  If you have a lot of moles, take digital photos of them each month. Make sure to take photos both up close and from a distance. These can help you see if any moles change over time.   Most skin changes are not cancer. But if you see any changes in your skin, call your doctor right away. Only he or she can diagnose a problem. If you have skin cancer, seeing your doctor can be the first step toward getting the treatment that could save your life.   [employer] last reviewed this educational content on 4/1/2019 2000-2020 [employer]. [address] [place] [address], [place] [address]. All rights reserved. This information is not intended as a substitute for professional medical care. Always follow your healthcare professional's instructions.       When should I call my doctor?  If you are worsening or not improving, please, contact us or seek urgent care as noted below.     Who should I call with questions (adults)?  [facility] (adult and pediatric): [phone]  [facility] (adult): [phone]  For urgent needs outside of business hours call the [facility] at [phone] and ask for the dermatology resident on call to be paged  If this is a medical emergency and you are unable to reach an ER, Call [phone]    Who should I call with questions (pediatric)?  [facility]- Pediatric Dermatology  Dr. Brittany Ellington, Dr. Brittney Anderson, Dr. Shelby Valle, RUPESH Carr, Dr. Lurdes Saleh, Dr. Sujey Danielson & Dr. Luis Duque  Non-urgent nurse triage line; [phone]- Delmi and Rosa GONGORA Care Coordinatorjames Ryan (/Complex ) [phone]    If you need a prescription refill, please contact your pharmacy. Refills are approved or denied by our  Physicians during normal business hours, Monday through Fridays  Per office policy, refills will not be granted if you have not been seen within the past year (or sooner depending on your child's condition)    Scheduling Information:  Pediatric Appointment Scheduling and Call Center [phone]  Radiology Scheduling- [phone]  Sedation Unit Scheduling- [phone]  [place] Scheduling- General [phone]; Pediatric Dermatology [phone]  Main  Services: [phone]  Turkish: [phone]  German: [phone]  Hmong/Italian/Irish: [phone]  Preadmission Nursing Department Fax Number: [phone] (Fax all pre-operative paperwork to this number)    For urgent matters arising during evenings, weekends, or holidays that cannot wait for normal business hours please call [phone] and ask for the dermatology resident on call to be paged.     Wound Care After a Biopsy    What is a skin biopsy?  A skin biopsy allows the doctor to examine a very small piece of tissue under the microscope to determine the diagnosis and the best treatment for the skin condition. A local anesthetic (numbing medicine)  is injected with a very small needle into the skin area to be tested. A small piece of skin is taken from the area. Sometimes a suture (stitch) is used.     What are the risks of a skin biopsy?  I will experience scar, bleeding, swelling, pain, crusting and redness. I may experience incomplete removal or recurrence. Risks of this procedure are excessive bleeding, bruising, infection, nerve damage, numbness, thick (hypertrophic or keloidal) scar and non-diagnostic biopsy.    How should I care for my wound for the first 24 hours?  Keep the wound dry and covered for 24 hours  If it bleeds, hold direct pressure on the area for 15 minutes. If bleeding does not stop then go to the emergency room  Avoid strenuous exercise the first 1-2 days or as your doctor instructs you    How should I care for the  wound after 24 hours?  After 24 hours, remove the bandage  You may bathe or shower as normal  If you had a scalp biopsy, you can shampoo as usual and can use shower water to clean the biopsy site daily  Clean the wound twice a day with gentle soap and water  Do not scrub, be gentle  Apply white petroleum/Vaseline after cleaning the wound with a cotton swab or a clean finger, and keep the site covered with a Bandaid /bandage. Bandages are not necessary with a scalp biopsy  If you are unable to cover the site with a Bandaid /bandage, re-apply ointment 2-3 times a day to keep the site moist. Moisture will help with healing  Avoid strenuous activity for first 1-2 days  Avoid lakes, rivers, pools, and oceans until the stitches are removed or the site is healed    How do I clean my wound?  Wash hands thoroughly with soap or use hand  before all wound care  Clean the wound with gentle soap and water  Apply white petroleum/Vaseline  to wound after it is clean  Replace the Bandaid /bandage to keep the wound covered for the first few days or as instructed by your doctor  If you had a scalp biopsy, warm shower water to the area on a daily basis should suffice    What should I use to clean my wound?   Cotton-tipped applicators (Qtips )  White petroleum jelly (Vaseline ). Use a clean new container and use Q-tips to apply.  Bandaids   as needed  Gentle soap     How should I care for my wound long term?  Do not get your wound dirty  Keep up with wound care for one week or until the area is healed.  A small scab will form and fall off by itself when the area is completely healed. The area will be red and will become pink in color as it heals. Sun protection is very important for how your scar will turn out. Sunscreen with an SPF 30 or greater is recommended once the area is healed.  If you have stitches, stitches need to be removed in 14 days. You may return to our clinic for this or you may have it done locally at your  doctor s office.  You should have some soreness but it should be mild and slowly go away over several days. Talk to your doctor about using tylenol for pain,    When should I call my doctor?  If you have increased:   Pain or swelling  Pus or drainage (clear or slightly yellow drainage is ok)  Temperature over 100F  Spreading redness or warmth around wound    When will I hear about my results?  The biopsy results can take 2 weeks to come back.  Your results will automatically release to [employer] before your provider has even reviewed them.  The clinic will call you with the results, send you a [employer] message, or have you schedule a follow-up clinic or phone time to discuss the results.  Contact our clinics if you do not hear from us in 2 weeks.    Who should I call with questions?  [facility]: [phone]  [facility]: [phone]  For urgent needs outside of business hours call the [facility] at [phone] and ask for the dermatology resident on call
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
## 2025-01-28 ENCOUNTER — OFFICE VISIT (OUTPATIENT)
Dept: DERMATOLOGY | Facility: CLINIC | Age: 50
End: 2025-01-28
Payer: COMMERCIAL

## 2025-01-28 DIAGNOSIS — D49.2 NEOPLASM OF UNSPECIFIED BEHAVIOR OF BONE, SOFT TISSUE, AND SKIN: ICD-10-CM

## 2025-01-28 DIAGNOSIS — D22.9 MULTIPLE BENIGN NEVI: ICD-10-CM

## 2025-01-28 DIAGNOSIS — Z85.828 HISTORY OF NONMELANOMA SKIN CANCER: ICD-10-CM

## 2025-01-28 DIAGNOSIS — Z85.820 HISTORY OF MELANOMA: ICD-10-CM

## 2025-01-28 DIAGNOSIS — L29.9 PRURITUS: ICD-10-CM

## 2025-01-28 DIAGNOSIS — L82.1 SEBORRHEIC KERATOSES: ICD-10-CM

## 2025-01-28 DIAGNOSIS — Z12.83 SKIN CANCER SCREENING: Primary | ICD-10-CM

## 2025-01-28 DIAGNOSIS — D18.01 CHERRY ANGIOMA: ICD-10-CM

## 2025-01-28 DIAGNOSIS — L81.4 SOLAR LENTIGO: ICD-10-CM

## 2025-01-28 PROCEDURE — 88342 IMHCHEM/IMCYTCHM 1ST ANTB: CPT | Performed by: DERMATOLOGY

## 2025-01-28 PROCEDURE — 11103 TANGNTL BX SKIN EA SEP/ADDL: CPT | Mod: XU | Performed by: DERMATOLOGY

## 2025-01-28 PROCEDURE — 99213 OFFICE O/P EST LOW 20 MIN: CPT | Mod: 25 | Performed by: DERMATOLOGY

## 2025-01-28 PROCEDURE — 88305 TISSUE EXAM BY PATHOLOGIST: CPT | Performed by: DERMATOLOGY

## 2025-01-28 PROCEDURE — 11102 TANGNTL BX SKIN SINGLE LES: CPT | Performed by: DERMATOLOGY

## 2025-01-28 PROCEDURE — 88341 IMHCHEM/IMCYTCHM EA ADD ANTB: CPT | Performed by: DERMATOLOGY

## 2025-01-28 ASSESSMENT — PAIN SCALES - GENERAL: PAINLEVEL_OUTOF10: NO PAIN (0)

## 2025-01-28 NOTE — LETTER
1/28/2025      Ree Batres  [address] [facility] [address]      Dear Colleague,    Thank you for referring your patient, Ree Batres, to the [facility] [place]. Please see a copy of my visit note below.    [facility] Dermatology Note  Encounter Date: Jan 28, 2025  Office Visit      Dermatology Problem List:  FBSE: 01/28/2025    # NUB, L paraspinal upper back   - s/p shave bx 01/28/2025    # NUB, L paraspinal mid back   - s/p shave bx 01/28/2025    1. MIS, L paraspinal lower back, S/p excision 7/12/23  2. NMSC  - BCC on right hand and left lower breast~1999  3. DN:  - Compound DN with mild atypia, R lateral flank. s/p shave bx 10/29/2024  - Junctional DN with moderate atypia , L posterior calf, s/p shave bx 10/29/2024  -  Lentiginous compound melanocytic nevus with mild atypia, L medial posterior shoulder, Bx proven on 4/30/24  - Mild DN, R paraspinal mid back, s/p shave bx 4/4/2023  - Lentiginous compound melanocytic nevus with mild atypia, R lateral mid back, S/p Biopsy 10/13/23  4. 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 Eczematous dermatitis   - TMC 0.1% ointment  6. Benign bx:   - Intradermal melanocytic nevus, L inguinal fold, s/p shave bx 4/4/2023   7. Allodynia after severe burn on her lower legs related to LHR.   - Tx: Gabapentin 100 mg nightly  8. Possibly inguinal lymphadenopathy  - Normal-sized, normal-appearing lymph nodes in both  groins. No mass or lymphadenopathy. S/p US 10/30/2024  9. Pruritus   - current tx: zyrtec 5-10 mg     # Lesions to Monitor  - Left medial posterior shoulder 7 mm even light brown macule. Photodocumentation taken on 10/13/2023.    Soc hx: son Luis, aged 10.   ____________________________________________    Assessment & Plan:    # NUB, L paraspinal upper back   - Shave biopsy performed today. See procedure note below.     # BLANCA HARRIS  paraspinal mid back   - Shave biopsy performed today. See procedure note below.     #Allodynia after severe burn on her lower legs related to LHR. Tolerating Gabapentin 100 nightly and this has made a huge difference!  - Continue use of gabapentin 100 mg nightly    # Pruritus, mainly around waistband. No rash present.   - unclear etiology  - Advised daily moisturization with bland emollient.   - advised she can try zyrtec (cetirizine) 5-10 mg daily. Ok to take up to 20 mg daily.     # Benign lesions - SKs, cherry angiomas, lentigenes.  - No treatment required    # Multiple benign nevi.   - Monitor for ABCDEs of melanoma   - Continue sun protection - recommend SPF 30 or higher with frequent application   - Return sooner if noticing changing or symptomatic lesions    # History of melanoma.  - no evidence of recurrent disease via inspection or palpation; all sites well-healed  - can feel some inguinal LNs due to habitus, US reassuring 10/30/24; continue to monitor   - continue photoprotection (such as SPF30+ sunscreen and proper use, UPF clothing, sun avoidance, tanning bed avoidance)  - monitor for ABCDE of melanoma; advised to bring any lesions of concern (new or changing over time) to medical attention    # History of NMSC. No evidence of recurrent disease.  - Continue photoprotection - recommend SPF 30 or higher with frequent reapplication  - Continue yearly skin exams  - Advised to monitor for changing, non-healing, bleeding, painful, changing, or otherwise symptomatic lesions    Procedures Performed:   - Shave biopsy: After discussion of benefits and risks including but not limited to bleeding, infection, scar, incomplete removal, recurrence, and non-diagnostic biopsy, written consent and photographs were obtained. The area was cleaned with isopropyl alcohol. < 1mL of 1% lidocaine with epinephrine was injected to obtain adequate anesthesia. A shave biopsy was performed. Hemostasis was achieved with aluminium  chloride. Vaseline and a sterile dressing were applied. The patient tolerated the procedure and no complications were noted. The patient was provided with verbal and written post care instructions.     Follow-up: 04/29/2025 as scheduled in-person, or earlier for new or changing lesions    Staff and scribe:    Scribe Disclosure:   I, Ayala Dyana, am serving as a scribe; to document services personally performed by Azul Rendon MD -based on data collection and the provider's statements to me.     Provider Disclosure:   The documentation recorded by the scribe accurately reflects the services I personally performed and the decisions made by me.    Azul Rendon MD    Department of Dermatology  [facility]: Phone: [phone], Fax: [phone]  2/1/2025       ____________________________________________    CC: Skin Check (FBSC/More dry all over radha abdomen)    HPI:  Ms. Ree Batres is a 49 year old female who presents today as a return patient for FBSC.     The patient reports that her skin has been feeling more dry and itchy, specifically around her waistband on her abdomen. The skin looks normal, no rash, it is just itchy.   To moisturize she uses Aveeno with a few drops of Neutrogena oil in it.   Last mothers day, she experienced anaphylactic shock, still unsure why this occurred, she has had allergy testing done that has showed she has no allergens that may have caused this.   She has not tried taking antihistamines.     Patient is otherwise feeling well, without additional concerns.    Labs:  Dermatopathology from 10/29/2024  Final Diagnosis   A. Left posterior calf:  - Junctional dysplastic nevus with moderate atypia - (see description)      B. Right lateral flank:  - Compound dysplastic nevus with mild atypia - (see description)     US Impression from 10/30/2024  IMPRESSION:  Normal-sized,  normal-appearing lymph nodes in both  groins. No mass or lymphadenopathy.    Physical exam:  Vitals: There were no vitals taken for this visit.  GEN: This is a well developed, well-nourished female in no acute distress, in a pleasant mood.    SKIN: Total skin excluding the undergarment areas was performed. The exam included the head/face, neck, both arms, chest, back, abdomen, both legs, digits and/or nails.   - L paraspinal upper back, 5 mm light/medium brown macule.   - L paraspinal mid back, 5 mm light/medium brown macule.  - L posterior calf, well healed bx site with no recurrent pigment   - There are dome shaped bright red papules on the trunk and extremities .   - Multiple regular brown pigmented macules and papules are identified on the trunk and extremities. .   - Scattered brown macules on sun exposed areas.  - Waxy stuck on papules and plaques on trunk and extremities.   - No other lesions of concern on areas examined.   LYMPH: some small mobile inguinal lymphadenopathy; no cervical, supraclavicular, axillary lymphadenopathy.      Medications:  Current Outpatient Medications   Medication Sig Dispense Refill     clobetasol (TEMOVATE) 0.05 % external solution APPLY TOPICALLY TO THE AFFECTED AREA TWICE DAILY 50 mL 2     clobetasol (TEMOVATE) 0.05 % external solution Apply topically 2 times daily 50 mL 11     EPINEPHrine (ANY BX GENERIC EQUIV) 0.3 MG/0.3ML injection 2-pack Inject 0.3 mLs (0.3 mg) into the muscle once as needed for anaphylaxis May repeat one time in 5-15 minutes if response to initial dose is inadequate. 2 each 0     gabapentin (NEURONTIN) 100 MG capsule Take 1 capsule (100 mg) by mouth at bedtime 90 capsule 3     ketoconazole (NIZORAL) 2 % external shampoo Apply topically daily as needed for itching or irritation 120 mL 10     triamcinolone (KENALOG) 0.1 % external cream Apply topically 2 times daily as instructed. 80 g 1     predniSONE (DELTASONE) 20 MG tablet Take two tablets (= 40mg) each  day for 5 (five) days (Patient not taking: Reported on 1/28/2025) 10 tablet 0     No current facility-administered medications for this visit.      Past Medical/Surgical History:   Patient Active Problem List   Diagnosis     History of basal cell cancer     ACP (advance care planning)     Elevated blood-pressure reading without diagnosis of hypertension     Past Medical History:   Diagnosis Date     Basal cell carcinoma of skin      H pylori ulcer     resolved     Malignant melanoma (H)      Renal disease     kidney stone in past/kidney infections                        Again, thank you for allowing me to participate in the care of your patient.        Sincerely,        Azul Rendon MD    Electronically signed

## 2025-01-28 NOTE — PATIENT INSTRUCTIONS
Zyrtec (cetirizine) 5-10 mg daily (can be taken up to 20 mg twice daily)    Wound Care After a Biopsy    What is a skin biopsy?  A skin biopsy allows the doctor to examine a very small piece of tissue under the microscope to determine the diagnosis and the best treatment for the skin condition. A local anesthetic (numbing medicine) is injected with a very small needle into the skin area to be tested. A small piece of skin is taken from the area. Sometimes a suture (stitch) is used.     What are the risks of a skin biopsy?  I will experience scar, bleeding, swelling, pain, crusting and redness. I may experience incomplete removal or recurrence. Risks of this procedure are excessive bleeding, bruising, infection, nerve damage, numbness, thick (hypertrophic or keloidal) scar and non-diagnostic biopsy.    How should I care for my wound for the first 24 hours?  Keep the wound dry and covered for 24 hours  If it bleeds, hold direct pressure on the area for 15 minutes. If bleeding does not stop, call us or go to the emergency room  Avoid strenuous exercise the first 1-2 days or as your doctor instructs you    How should I care for the wound after 24 hours?  After 24 hours, remove the bandage  You may bathe or shower as normal  If you had a scalp biopsy, you can shampoo as usual and can use shower water to clean the biopsy site daily  Clean the wound once a day with gentle soap and water  Do not scrub, be gentle  Apply white petroleum/Vaseline after cleaning the wound with a cotton swab or a clean finger, and keep the site covered with a Bandaid /bandage. Bandages are not necessary with a scalp biopsy  If you are unable to cover the site with a Bandaid /bandage, re-apply ointment 2-3 times a day to keep the site moist. Moisture will help with healing  Avoid strenuous activity for first 1-2 days  Avoid lakes, rivers, pools, and oceans until the stitches are removed or the site is healed    How do I clean my wound?  Wash  hands thoroughly with soap or use hand  before all wound care  Clean the wound with gentle soap and water  Apply white petroleum/Vaseline  to wound after it is clean  Replace the Bandaid /bandage to keep the wound covered for the first few days or as instructed by your doctor  If you had a scalp biopsy, warm shower water to the area on a daily basis should suffice    What should I use to clean my wound?   Cotton-tipped applicators (Qtips )  White petroleum jelly (Vaseline ). Use a clean new container and use Q-tips to apply.  Bandaids  as needed  Gentle soap     How should I care for my wound long term?  Do not get your wound dirty  Keep up with wound care for one week or until the area is healed.  A small scab will form and fall off by itself when the area is completely healed. The area will be red and will become pink in color as it heals. Sun protection is very important for how your scar will turn out. Sunscreen with an SPF 30 or greater is recommended once the area is healed.  You should have some soreness but it should be mild and slowly go away over several days. Talk to your doctor about using tylenol for pain,    When should I call my doctor?  If you have increased:   Pain or swelling  Pus or drainage (clear or slightly yellow drainage is ok)  Temperature over 100F  Spreading redness or warmth around wound    When will I hear about my results?  The biopsy results can take 2 weeks to come back.  Your results will automatically release to [employer] before your provider has even reviewed them.  The clinic will call you with the results, send you a [employer] message, or have you schedule a follow-up clinic or phone time to discuss the results.  Contact our clinics if you do not hear from us in 2 weeks.    Who should I call with questions?  [facility]: [phone]  [facility]: [phone]  For urgent needs outside of business hours call the  [facility] at [phone] and ask for the dermatology resident on call           Proper skin care from [facility]:    -Eliminate harsh soaps as they strip the natural oils from the skin, often resulting in dry itchy skin ( i.e. Dial, Zest, Jamee Spring)  -Use mild soaps such as Cetaphil or Dove Sensitive Skin in the shower. You do not need to use soap on arms, legs, and trunk every time you shower unless visibly soiled.   -Avoid hot or cold showers.  -After showering, lightly dry off and apply moisturizing within 2-3 minutes. This will help trap moisture in the skin.   -Aggressive use of a moisturizer at least 1-2 times a day to the entire body (including -Vanicream, Cetaphil, Aquaphor or Cerave) and moisturize hands after every washing.  -We recommend using moisturizers that come in a tub that needs to be scooped out, not a pump. This has more of an oil base. It will hold moisture in your skin much better than a water base moisturizer. The above recommended are non-pore clogging.      Wear a sunscreen with at least SPF 30 on your face, ears, neck and V of the chest daily. Wear sunscreen on other areas of the body if those areas are exposed to the sun throughout the day. Sunscreens can contain physical and/or chemical blockers. Physical blockers are less likely to clog pores, these include zinc oxide and titanium dioxide. Reapply every two hour and after swimming.     Sunscreen examples: https://www.ewg.org/sunscreen/    UV radiation  UVA radiation remains constant throughout the day and throughout the year. It is a longer wavelength than UVB and therefore penetrates deeper into the skin leading to immediate and delayed tanning, photoaging, and skin cancer. 70-80% of UVA and UVB radiation occurs between the hours of 10am-2pm.  UVB radiation  UVB radiation causes the most harmful effects and is more significant during the summer months. However, snow and ice can reflect UVB radiation leading to skin  damage during the winter months as well. UVB radiation is responsible for tanning, burning, inflammation, delayed erythema (pinkness), pigmentation (brown spots), and skin cancer.     I recommend self monthly full body exams and yearly full body exams with a dermatology provider. If you develop a new or changing lesion please follow up for examination. Most skin cancers are pink and scaly or pink and pearly. However, we do see blue/brown/black skin cancers.  Consider the ABCDEs of melanoma when giving yourself your monthly full body exam ( don't forget the groin, buttocks, feet, toes, etc). A-asymmetry, B-borders, C-color, D-diameter, E-elevation or evolving. If you see any of these changes please follow up in clinic. If you cannot see your back I recommend purchasing a hand held mirror to use with a larger wall mirror.       Checking for Skin Cancer  You can find cancer early by checking your skin each month. There are 3 kinds of skin cancer. They are melanoma, basal cell carcinoma, and squamous cell carcinoma. Doing monthly skin checks is the best way to find new marks or skin changes. Follow the instructions below for checking your skin.   The ABCDEs of checking moles for melanoma   Check your moles or growths for signs of melanoma using ABCDE:   Asymmetry: the sides of the mole or growth don t match  Border: the edges are ragged, notched, or blurred  Color: the color within the mole or growth varies  Diameter: the mole or growth is larger than 6 mm (size of a pencil eraser)  Evolving: the size, shape, or color of the mole or growth is changing (evolving is not shown in the images below)    Checking for other types of skin cancer  Basal cell carcinoma or squamous cell carcinoma have symptoms such as:     A spot or mole that looks different from all other marks on your skin  Changes in how an area feels, such as itching, tenderness, or pain  Changes in the skin's surface, such as oozing, bleeding, or scaliness  A  sore that does not heal  New swelling or redness beyond the border of a mole    Who s at risk?  Anyone can get skin cancer. But you are at greater risk if you have:   Fair skin, light-colored hair, or light-colored eyes  Many moles or abnormal moles on your skin  A history of sunburns from sunlight or tanning beds  A family history of skin cancer  A history of exposure to radiation or chemicals  A weakened immune system  If you have had skin cancer in the past, you are at risk for recurring skin cancer.   How to check your skin  Do your monthly skin checkups in front of a full-length mirror. Check all parts of your body, including your:   Head (ears, face, neck, and scalp)  Torso (front, back, and sides)  Arms (tops, undersides, upper, and lower armpits)  Hands (palms, backs, and fingers, including under the nails)  Buttocks and genitals  Legs (front, back, and sides)  Feet (tops, soles, toes, including under the nails, and between toes)  If you have a lot of moles, take digital photos of them each month. Make sure to take photos both up close and from a distance. These can help you see if any moles change over time.   Most skin changes are not cancer. But if you see any changes in your skin, call your doctor right away. Only he or she can diagnose a problem. If you have skin cancer, seeing your doctor can be the first step toward getting the treatment that could save your life.   [email or web address] last reviewed this educational content on 4/1/2019 2000-2020 [employer]. [address] [place] [address], [place] [address]. All rights reserved. This information is not intended as a substitute for professional medical care. Always follow your healthcare professional's instructions.       When should I call my doctor?  If you are worsening or not improving, please, contact us or seek urgent care as noted below.     Who should I call with questions (adults)?    [facility]  [phone]  For urgent needs outside of business hours call the [facility] at [phone] and ask for the dermatology resident on call to be paged  If this is a medical emergency and you are unable to reach an ER, Call 911      If you need a prescription refill, please contact your pharmacy. Refills are approved or denied by our Physicians during normal business hours, Monday through Friday.  Per office policy, refills will not be granted if you have not been seen within the past year (or sooner depending on the condition).

## 2025-01-31 ENCOUNTER — TELEPHONE (OUTPATIENT)
Dept: DERMATOLOGY | Facility: CLINIC | Age: 50
End: 2025-01-31
Payer: COMMERCIAL

## 2025-01-31 NOTE — TELEPHONE ENCOUNTER
Attempted to reach patient to relay results, call went straight to . Informed patient to check [employer] message and to call us with any questions/concerns. Will place Derm Surgery referral once patient has viewed result note or returns our call    Leslye Rea LPN

## 2025-01-31 NOTE — TELEPHONE ENCOUNTER
[facility] Call Center    Phone Message    May a detailed message be left on voicemail: yes     Reason for Call: Other: Please return call for path results. Thank you     Action Taken: TE SENT    Travel Screening: Not Applicable     Date of Service:                   Thank you!  [facility]

## 2025-02-03 NOTE — TELEPHONE ENCOUNTER
Seen by patient Ree Batres on 1/31/2025  1:14 PM     Patient read Results note and a Derm Surg referral was placed on 1/31/25

## 2025-02-05 ENCOUNTER — TELEPHONE (OUTPATIENT)
Dept: DERMATOLOGY | Facility: CLINIC | Age: 50
End: 2025-02-05
Payer: COMMERCIAL

## 2025-02-05 NOTE — TELEPHONE ENCOUNTER
Left patient a voicemail to schedule an excision for Excision Left paraspinal mid back:  - Compound dysplastic nevus with Dr. Byrne. Provided my direct phone number.    Ermelinda Fay on 2/5/2025 at 8:40 AM

## 2025-02-05 NOTE — TELEPHONE ENCOUNTER
Called patient to schedule surgery with Dr. Byrne    Date of Surgery: 04/15    Surgery type: excision     Consult scheduled: No    Has patient had mohs with us before? Not Applicable    Additional comments: brendon Fay on 2/5/2025 at 9:43 AM

## 2025-02-27 ENCOUNTER — HOSPITAL ENCOUNTER (EMERGENCY)
Facility: CLINIC | Age: 50
End: 2025-02-27
Attending: EMERGENCY MEDICINE
Payer: COMMERCIAL

## 2025-02-27 VITALS
DIASTOLIC BLOOD PRESSURE: 94 MMHG | SYSTOLIC BLOOD PRESSURE: 125 MMHG | RESPIRATION RATE: 18 BRPM | HEART RATE: 102 BPM | OXYGEN SATURATION: 96 % | TEMPERATURE: 98.4 F

## 2025-02-27 DIAGNOSIS — R11.10 VOMITING AND DIARRHEA: ICD-10-CM

## 2025-02-27 DIAGNOSIS — R19.7 VOMITING AND DIARRHEA: ICD-10-CM

## 2025-02-27 DIAGNOSIS — R69 TRAVEL-RELATED ILLNESS: ICD-10-CM

## 2025-02-27 LAB
ALBUMIN SERPL BCG-MCNC: 4.6 G/DL (ref 3.5–5.2)
ALP SERPL-CCNC: 47 U/L (ref 40–150)
ALT SERPL W P-5'-P-CCNC: 10 U/L (ref 0–50)
ANION GAP SERPL CALCULATED.3IONS-SCNC: 13 MMOL/L (ref 7–15)
AST SERPL W P-5'-P-CCNC: 14 U/L (ref 0–45)
BASOPHILS # BLD AUTO: 0 10E3/UL (ref 0–0.2)
BASOPHILS NFR BLD AUTO: 0 %
BILIRUB SERPL-MCNC: 1 MG/DL
BUN SERPL-MCNC: 14.2 MG/DL (ref 6–20)
CALCIUM SERPL-MCNC: 9.1 MG/DL (ref 8.8–10.4)
CHLORIDE SERPL-SCNC: 102 MMOL/L (ref 98–107)
CREAT SERPL-MCNC: 0.61 MG/DL (ref 0.51–0.95)
EGFRCR SERPLBLD CKD-EPI 2021: >90 ML/MIN/1.73M2
EOSINOPHIL # BLD AUTO: 0 10E3/UL (ref 0–0.7)
EOSINOPHIL NFR BLD AUTO: 0 %
ERYTHROCYTE [DISTWIDTH] IN BLOOD BY AUTOMATED COUNT: 13.2 % (ref 10–15)
GLUCOSE SERPL-MCNC: 96 MG/DL (ref 70–99)
HCO3 SERPL-SCNC: 21 MMOL/L (ref 22–29)
HCT VFR BLD AUTO: 37.7 % (ref 35–47)
HGB BLD-MCNC: 13 G/DL (ref 11.7–15.7)
IMM GRANULOCYTES # BLD: 0.1 10E3/UL
IMM GRANULOCYTES NFR BLD: 1 %
LIPASE SERPL-CCNC: 32 U/L (ref 13–60)
LYMPHOCYTES # BLD AUTO: 0.3 10E3/UL (ref 0.8–5.3)
LYMPHOCYTES NFR BLD AUTO: 3 %
MCH RBC QN AUTO: 30.1 PG (ref 26.5–33)
MCHC RBC AUTO-ENTMCNC: 34.5 G/DL (ref 31.5–36.5)
MCV RBC AUTO: 87 FL (ref 78–100)
MONOCYTES # BLD AUTO: 0.4 10E3/UL (ref 0–1.3)
MONOCYTES NFR BLD AUTO: 3 %
NEUTROPHILS # BLD AUTO: 9.8 10E3/UL (ref 1.6–8.3)
NEUTROPHILS NFR BLD AUTO: 93 %
NRBC # BLD AUTO: 0 10E3/UL
NRBC BLD AUTO-RTO: 0 /100
PLATELET # BLD AUTO: 267 10E3/UL (ref 150–450)
POTASSIUM SERPL-SCNC: 4.1 MMOL/L (ref 3.4–5.3)
PROT SERPL-MCNC: 7.4 G/DL (ref 6.4–8.3)
RBC # BLD AUTO: 4.32 10E6/UL (ref 3.8–5.2)
SODIUM SERPL-SCNC: 136 MMOL/L (ref 135–145)
WBC # BLD AUTO: 10.6 10E3/UL (ref 4–11)

## 2025-02-27 PROCEDURE — 83690 ASSAY OF LIPASE: CPT | Performed by: EMERGENCY MEDICINE

## 2025-02-27 PROCEDURE — 258N000003 HC RX IP 258 OP 636: Performed by: EMERGENCY MEDICINE

## 2025-02-27 PROCEDURE — 96375 TX/PRO/DX INJ NEW DRUG ADDON: CPT

## 2025-02-27 PROCEDURE — 99284 EMERGENCY DEPT VISIT MOD MDM: CPT | Mod: 25

## 2025-02-27 PROCEDURE — 250N000011 HC RX IP 250 OP 636: Performed by: EMERGENCY MEDICINE

## 2025-02-27 PROCEDURE — 80053 COMPREHEN METABOLIC PANEL: CPT | Performed by: EMERGENCY MEDICINE

## 2025-02-27 PROCEDURE — 36415 COLL VENOUS BLD VENIPUNCTURE: CPT | Performed by: EMERGENCY MEDICINE

## 2025-02-27 PROCEDURE — 96361 HYDRATE IV INFUSION ADD-ON: CPT

## 2025-02-27 PROCEDURE — 96374 THER/PROPH/DIAG INJ IV PUSH: CPT

## 2025-02-27 PROCEDURE — 85004 AUTOMATED DIFF WBC COUNT: CPT | Performed by: EMERGENCY MEDICINE

## 2025-02-27 RX ORDER — KETOROLAC TROMETHAMINE 15 MG/ML
10 INJECTION, SOLUTION INTRAMUSCULAR; INTRAVENOUS ONCE
Status: COMPLETED | OUTPATIENT
Start: 2025-02-27 | End: 2025-02-27

## 2025-02-27 RX ORDER — METOCLOPRAMIDE HYDROCHLORIDE 5 MG/ML
10 INJECTION INTRAMUSCULAR; INTRAVENOUS ONCE
Status: COMPLETED | OUTPATIENT
Start: 2025-02-27 | End: 2025-02-27

## 2025-02-27 RX ADMIN — SODIUM CHLORIDE 1000 ML: 0.9 INJECTION, SOLUTION INTRAVENOUS at 23:24

## 2025-02-27 RX ADMIN — METOCLOPRAMIDE 10 MG: 5 INJECTION, SOLUTION INTRAMUSCULAR; INTRAVENOUS at 23:21

## 2025-02-27 RX ADMIN — KETOROLAC TROMETHAMINE 10 MG: 15 INJECTION, SOLUTION INTRAMUSCULAR; INTRAVENOUS at 23:21

## 2025-02-27 ASSESSMENT — COLUMBIA-SUICIDE SEVERITY RATING SCALE - C-SSRS
1. IN THE PAST MONTH, HAVE YOU WISHED YOU WERE DEAD OR WISHED YOU COULD GO TO SLEEP AND NOT WAKE UP?: NO
6. HAVE YOU EVER DONE ANYTHING, STARTED TO DO ANYTHING, OR PREPARED TO DO ANYTHING TO END YOUR LIFE?: NO
2. HAVE YOU ACTUALLY HAD ANY THOUGHTS OF KILLING YOURSELF IN THE PAST MONTH?: NO

## 2025-02-27 ASSESSMENT — ACTIVITIES OF DAILY LIVING (ADL): ADLS_ACUITY_SCORE: 41

## 2025-02-28 VITALS
OXYGEN SATURATION: 96 % | HEART RATE: 99 BPM | RESPIRATION RATE: 18 BRPM | SYSTOLIC BLOOD PRESSURE: 127 MMHG | DIASTOLIC BLOOD PRESSURE: 86 MMHG | TEMPERATURE: 98.4 F

## 2025-02-28 LAB

## 2025-02-28 PROCEDURE — 87507 IADNA-DNA/RNA PROBE TQ 12-25: CPT | Performed by: EMERGENCY MEDICINE

## 2025-02-28 PROCEDURE — 258N000003 HC RX IP 258 OP 636: Performed by: EMERGENCY MEDICINE

## 2025-02-28 RX ORDER — METOCLOPRAMIDE 10 MG/1
10 TABLET ORAL
Qty: 10 TABLET | Refills: 0 | Status: SHIPPED | OUTPATIENT
Start: 2025-02-28 | End: 2025-02-28

## 2025-02-28 RX ORDER — METOCLOPRAMIDE 10 MG/1
10 TABLET ORAL 4 TIMES DAILY PRN
Qty: 12 TABLET | Refills: 0 | Status: SHIPPED | OUTPATIENT
Start: 2025-02-28

## 2025-02-28 RX ADMIN — SODIUM CHLORIDE 1000 ML: 9 INJECTION, SOLUTION INTRAVENOUS at 00:16

## 2025-02-28 ASSESSMENT — ACTIVITIES OF DAILY LIVING (ADL): ADLS_ACUITY_SCORE: 41

## 2025-02-28 NOTE — ED PROVIDER NOTES
Emergency Department Note      History of Present Illness     Chief Complaint   Abdominal Pain and Nausea, Vomiting, & Diarrhea    HPI   Ree Batres is a 49 year old female abdominal pain, nausea, vomiting, and diarrhea that started at 1550 today. Ree is experiencing lower abdominal cramping that radiates to her hips as well as generalized leg pain. No blood reported in her stool or vomitus. Of note, Ree recently traveled to [place], returning 5 days ago (2/22/25). Her sister was experiencing similar symptoms during the last days of their trip that lasted about 36 hours. The patient denies drinking suspicious water, only noting eating out at a restaurant one night (2/16/25).     Independent Historian   None    Review of External Notes   I reviewed Care Everywhere and updated Epic.     Past Medical History     Medical History and Problem List   Past Medical History:   Diagnosis Date    Basal cell carcinoma of skin     H pylori ulcer     Kidney stone     Malignant melanoma      Medications   clobetasol (TEMOVATE) 0.05 % external solution  EPINEPHrine (ANY BX GENERIC EQUIV) 0.3 MG/0.3ML injection 2-pack  gabapentin (NEURONTIN) 100 MG capsule  ketoconazole (NIZORAL) 2 % external shampoo  triamcinolone (KENALOG) 0.1 % external cream      Surgical History   Past Surgical History:   Procedure Laterality Date    APPENDECTOMY      CHOLECYSTECTOMY  05/2009    COLPOSCOPY, CONIZATION, COMBINED  10/19/2012    Procedure: COMBINED COLPOSCOPY, CONIZATION;  Loop Electrosurgical Excision Procedure, Colposcopy;  Surgeon: Anahi Huang MD;  Location: [facility] OR    KNEE SURGERY Left      Physical Exam     Patient Vitals for the past 24 hrs:   BP Temp Temp src Pulse Resp SpO2   02/27/25 2314 -- 98.4  F (36.9  C) Oral -- 18 --   02/27/25 2312 -- -- -- -- -- 96 %   02/27/25 2311 -- -- -- -- -- 97 %   02/27/25 2309 -- -- -- -- -- 97 %   02/27/25 2308 -- -- -- -- -- 96 %   02/27/25 2307 -- -- -- -- -- 96 %   02/27/25 2306 -- -- --  "-- -- 95 %   02/27/25 2305 -- -- -- -- -- 96 %   02/27/25 2304 (!) 125/94 -- -- 102 -- 96 %     Physical Exam  Nursing note and vitals reviewed.  Constitutional: Cooperative. Uncomfortable appearing.   HENT:   Mouth/Throat: Mucous membranes are normal.   Eyes: Pupils are equal, round, and reactive to light.   Cardiovascular: Normal rate, regular rhythm and normal heart sounds.  No murmur.  Pulmonary/Chest: Effort normal and breath sounds normal. No respiratory distress. No wheezes. No rales.   Abdominal: Soft. Normal appearance and bowel sounds are normal. No distension. There is diffuse lower abdominal tenderness. There is no rigidity and no guarding.   Musculoskeletal: Normal range of motion.   Neurological: Alert.   Skin: Skin is warm and dry. No rash noted.   Psychiatric: Normal mood and affect.      Diagnostics     Lab Results   Labs Ordered and Resulted from Time of ED Arrival to Time of ED Departure - No data to display    Imaging   No orders to display     EKG   ECG taken at ***, ECG read at ***  ***   *** as compared to prior, dated ***/***/***.  Rate *** bpm. WY interval *** ms. QRS duration *** ms. QT/QTc ***/*** ms. P-R-T axes *** *** ***.    Independent Interpretation   {IndependentReview:409030::\"None\"}    ED Course      Medications Administered   Medications   sodium chloride 0.9% BOLUS 1,000 mL (has no administration in time range)   sodium chloride 0.9% BOLUS 1,000 mL (has no administration in time range)   ketorolac (TORADOL) injection 10 mg (has no administration in time range)   metoclopramide (REGLAN) injection 10 mg (has no administration in time range)     Procedures   Procedures     Discussion of Management   {Consults/Care Discussions:812659::\"None\"}    ED Course   ED Course as of 02/27/25 2316 Thu Feb 27, 2025 2308 I obtained the history and examined the patient as noted above.        Additional Documentation  {[facility]611310::\"None\"}    Medical Decision Making / Diagnosis " "    CMS Diagnoses: {Sepsis/Septic Shock/Stemi/Stroke:481776::\"None\"}    MIPS       {EPPA MIPS:809679::\"None\"}    ANGELA Batres is a 49 year old female ***    Disposition   {EPPAFV Dispo:537496}    Diagnosis   No diagnosis found.     Discharge Medications   New Prescriptions    No medications on file     Scribe Disclosure:  I, Rosario Stevenson, am serving as a scribe at 10:59 PM on 2/27/2025 to document services personally performed by James Alfred MD based on my observations and the provider's statements to me.     " interventions in ED.     Disposition   The patient was discharged.     Diagnosis     ICD-10-CM    1. Vomiting and diarrhea  R11.10     R19.7       2. Travel-related illness ([place])  R69          Discharge Medications   New Prescriptions    METOCLOPRAMIDE (REGLAN) 10 MG TABLET    Take 1 tablet (10 mg) by mouth 4 times daily as needed for nausea.     Scribe Disclosure:  Rosario YANEZ, am serving as a scribe at 10:59 PM on 2/27/2025 to document services personally performed by James Alfred MD based on my observations and the provider's statements to me.        James Alfred MD  02/28/25 0250

## 2025-02-28 NOTE — ED TRIAGE NOTES
Patient arrives from home via EMS, Around 3pm today patient started to have vomiting and watery-loose stool 10 episodes, Abdominal pain from belly button to back. Recent travel from [place] back here on Saturday, EMS gave benadryl 25mg due to allergy to zofran for nausea.     Triage Assessment (Adult)       Row Name 02/27/25 6291          Triage Assessment    Airway WDL WDL        Respiratory WDL    Respiratory WDL WDL        Skin Circulation/Temperature WDL    Skin Circulation/Temperature WDL WDL        Peripheral/Neurovascular WDL    Peripheral Neurovascular WDL WDL        Cognitive/Neuro/Behavioral WDL    Cognitive/Neuro/Behavioral WDL WDL

## 2025-04-15 ENCOUNTER — OFFICE VISIT (OUTPATIENT)
Dept: DERMATOLOGY | Facility: CLINIC | Age: 50
End: 2025-04-15
Payer: COMMERCIAL

## 2025-04-15 VITALS — SYSTOLIC BLOOD PRESSURE: 155 MMHG | HEART RATE: 75 BPM | DIASTOLIC BLOOD PRESSURE: 93 MMHG

## 2025-04-15 DIAGNOSIS — D23.9 DYSPLASTIC NEVUS: ICD-10-CM

## 2025-04-15 DIAGNOSIS — L40.8 SEBOPSORIASIS: ICD-10-CM

## 2025-04-15 PROCEDURE — 88305 TISSUE EXAM BY PATHOLOGIST: CPT | Mod: TC | Performed by: DERMATOLOGY

## 2025-04-15 PROCEDURE — 88305 TISSUE EXAM BY PATHOLOGIST: CPT | Mod: 26 | Performed by: DERMATOLOGY

## 2025-04-15 RX ORDER — CLOBETASOL PROPIONATE 0.5 MG/ML
SOLUTION TOPICAL 2 TIMES DAILY
Qty: 50 ML | Refills: 1 | Status: SHIPPED | OUTPATIENT
Start: 2025-04-15

## 2025-04-15 NOTE — NURSING NOTE
Chief Complaint   Patient presents with    Derm Problem     Excision- L paraspinal mid back     Mayra CHUA RN  Dermatology Surgery
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
## 2025-04-15 NOTE — PATIENT INSTRUCTIONS
Wound care    I will experience scar, bleeding, swelling, pain, crusting and redness. I may experience incomplete removal or recurrence. Risks are bleeding, bruising, swelling, infection, nerve damage, & a large wound. A second procedure may be recommended to obtain the best cosmetic or functional result.       A three month office visit with your Surgeon is recommended for scar evaluation. Please reach out sooner if you have concerns about you surgical site/wound.    Caring for your skin after surgery    After your surgery, a pressure bandage will be placed over the area that has stitches. This is important to prevent bleeding. Please follow these instructions over the next 1 to 2 weeks. Following this regimen will help to prevent complications as your wound heals.     For the first 48 hours after your surgery:    Leave the pressure dressing on and keep it dry. If it should come loose, you may re-tape it, but do not take it off.  Relax and take it easy. Do not do any vigorous exercise or heavy lifting. This could cause the wound to bleed.  Post-Operative pain is usually mild. If you are able to take tylenol, You may take plain or extra-strength Tylenol (acetaminophen) As directed on the bottle (do not take more than 4,000mg in one day). If you are able to take ibuprofen, you can alternate the tylenol and ibuprofen.   Avoid alcohol as this may increase your tendency to bleed.   You may put an ice pack around the bandaged area for 20 minutes at a time as needed. This may help reduce swelling, bruising, and pain. Make sure the ice pack is waterproof so that the pressure bandage doesn t get wet.  If the wound is on the face try to sleep with your head elevated. Either in a recliner or propped up in bed, this will decrease swelling around the eyes.   You may see a small amount of drainage or blood on your pressure bandage. This is normal. However:  If drainage or bleeding continues or saturates the bandage, you will  need to apply firm pressure over the bandage with a piece of gauze for 15 minutes.  If bleeding continues after applying pressure for 15 minutes, apply an ice pack to the bandaged area for 15 minutes.  If bleeding still continues, call our office or go to the nearest emergency room.    Remove pressure dressing 48 hours after surgery:    Carefully remove the pressure bandage. If it seems sticky or too difficult to get off, you may need to soak it off in the shower.  After the pressure dressing is removed, you may shower and get the wound wet. However, Do Not let the forceful stream of the shower hit the wound directly.  Follow these wound care and dressing change instructions:    You have skin glue over the stitches. This will dry and flake off with time (about 2 weeks). If the stitches are still hanging around after 2 weeks, let us know, we can clip them out for you if they do not fall out with washing.   You may allow water to run over the site. Do not soak.  Do Not rub or scrub the site.  Pat dry after the shower or bath.  Avoid topical medications, lotions, creams, ointment,or oils.  Do not use tanning lamps or expose the site to sun.   Check wound appearance daily, some swelling and redness is normal after a procedure but should go away as your would is healing. If the swelling and redness or pain increases or if any other signs of infection occur listed below please send in a photo via my chart and or call us to let us know.  The clear glue film should start peeling and flaking off approximately around 2 weeks. By this time your wound should be sufficiently healed. If it still looks to be healing when the glue comes off you can clean the wound with soapy warm water daily in the shower. No need to bandage further, but you can put a bandage on the area daily for the two weeks if you would like.   If skin glue and sutures are still intact at 2 weeks after your procedure, you can start applying Vaseline daily to  "help soften up the skin glue. It will come off easier this way.        If you are able to take acetaminophen (\"Tylenol,\" etc.) and ibuprofen (\"Advil\" or \"Motrin,\" etc.), then you may STAGGER these medications by taking 400 mg of ibuprofen (usually two tabs) every 8 hours and 1,000 mg of acetaminophen (e.g., two tabs of extra-strength Tylenol) every 8 hours.    This means, for example, that you could take the followin,000 mg of Tylenol, followed 4 hours later by 400 mg Ibuprofen, followed 4 hours later with 1,000 mg of Tylenol, followed 4 hours later by 400 mg Ibuprofen, followed 4 hours later with 1,000 mg of Tylenol, and so forth.     Essentially, you can take either 1,000 mg of Tylenol or 400 mg of ibuprofen in alternating fashion EVERY FOUR HOURS.    Do NOT exceed more than 4,000 mg of Tylenol or 3,200 mg of ibuprofen per 24 hours. If you are not able to take Tylenol or ibuprofen as above due to other health issues (or a physician has told you directly that you are not allowed to take one of them, say due to pre-existing severe liver or kidney issues), then disregard the above directions.    Scientific evidence supports that this combination/schedule of pain medications is just as effective, if not more effective, than taking a narcotic pain medicine.       Follow up will be a 3 month scar evaluation either in person or via a telephone visit with you sending in a photo via [employer]. Unless you have been told to follow up sooner or if you have concerns and would like to be see sooner. Please call or send us in a [employer] message if possible and attach a photo.        What to expect:    The first couple of days your wound may be tender and may bleed slightly when doing wound care.  There may be swelling and bruising around the wound, especially if it is near the eyes. For your comfort, you may apply ice or cold compresses to the bruises after your have removed the pressure bandage.  The area around your wound may " be numb for several weeks or even months.  You may experience periodic sharp pain or mild itching around the wound as it heals.   The suture line will look dark for a while but will lighten over time.       When to call us:    You have bleeding that will not stop after applying pressure and ice.  You have pain that is not controlled with Tylenol (acetaminophen.)  You have signs or symptoms of an infection such as:  Fever over 100 degrees Fahrenheit  Redness, warmth or foul-smelling drainage from the wound  If you have any questions, or are not sure how to take care of the wound.    Phone numbers:    During business hours (M-F 8:00-4:30 p.m.)  [facility]-  [phone] Option 1 appt. Desk and ask for the Dermatology Surgery Team  [phone] [facility] .     ---------------------------------------------------------  Evenings/Weekends/Holidays  Hospital - [phone]   TTY for hearing cuhxidlm-[phone]  *Ask  to page dermatologist on-call  Emergency Tqgz-[phone]  TTY for hearing impaired- [phone]

## 2025-04-16 ENCOUNTER — TELEPHONE (OUTPATIENT)
Dept: DERMATOLOGY | Facility: CLINIC | Age: 50
End: 2025-04-16
Payer: COMMERCIAL

## 2025-04-16 NOTE — TELEPHONE ENCOUNTER
Follow up call attempted & left voicemail following excision procedure with Dr. Byrne.       Are you having pain?   Are you taking pain medication?   Are you applying ice?    Have you had any noticeable bleeding through the bandage?    Do you have any other concerns?        Please call [phone] if you have any questions or concerns during business hours. For concerns after hours, call the hospital  to reach the dermatology resident on call at [phone], option 4.       Mayra CHUA RN  Dermatology Surgery

## 2025-04-28 NOTE — PROGRESS NOTES
[facility] Dermatology Note  Encounter Date: Apr 29, 2025  Office Visit      Dermatology Problem List:  FBSE: 04/29/2025    # NUB, L volar forearm  # NUB, R medial thigh  # NUB, L mid middle back  - s/p shave bx 04/29/2025    1. MIS, L paraspinal lower back, S/p excision 7/12/23  2. NMSC  - BCC on right hand and left lower breast~1999  3. DN:  - Compound dysplastic nevus with moderate to severe atypia, L paraspinal mid back, S/p Excision on 4/15/25.   - Compound DN with mild atypia, R lateral flank. s/p shave bx 10/29/2024  - Junctional DN with moderate atypia , L posterior calf, s/p shave bx 10/29/2024  -  Lentiginous compound melanocytic nevus with mild atypia, L medial posterior shoulder, Bx proven on 4/30/24  - Mild DN, R paraspinal mid back, s/p shave bx 4/4/2023  - Lentiginous compound melanocytic nevus with mild atypia, R lateral mid back, S/p Biopsy 10/13/23  4. 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 Eczematous dermatitis   - TMC 0.1% ointment  6. Benign bx:   - Lentiginous junctional melanocytic nevus with mild atypia, L paraspinal upper back. S/p shave bx 01/28/2025  - Intradermal melanocytic nevus, L inguinal fold, s/p shave bx 4/4/2023   7. Allodynia after severe burn on her lower legs related to LHR.   - Tx: Gabapentin 100 mg nightly  8. Possibly inguinal lymphadenopathy  - Normal-sized, normal-appearing lymph nodes in both  groins. No mass or lymphadenopathy. S/p US 10/30/2024  9. Pruritus   - current tx: zyrtec 5-10 mg     # Lesions to Monitor  - Left medial posterior shoulder 7 mm even light brown macule. Photodocumentation taken on 10/13/2023.    Soc hx: son Luis, aged 10.   ____________________________________________    Assessment & Plan:    # NUB, L volar forearm  - Shave biopsy performed today. See procedure note below.     # FELIPE HARRIS medial thigh  - Shave biopsy performed today. See procedure  note below.     # NUB, L mid middle back  - Shave biopsy performed today. See procedure note below.     # Allodynia after severe burn on her lower legs related to LHR. Tolerating Gabapentin 100 nightly and this has made a huge difference!  - Continue use of gabapentin 100 mg nightly    # Pruritus, mainly around waistband. Resolved.  - unclear etiology  - Advised daily moisturization with bland emollient.   - advised she can try zyrtec (cetirizine) 5-10 mg daily. Ok to take up to 20 mg daily.     # Benign lesions - SKs, cherry angiomas, lentigenes.  - No treatment required    # Multiple benign nevi   - Monitor for ABCDEs of melanoma   - Continue sun protection - recommend SPF 30 or higher with frequent application   - Return sooner if noticing changing or symptomatic lesions     # History of melanoma.  # Hx of DN  - no evidence of recurrent disease via inspection or palpation; all sites well-healed  - lymph node exam negative for lymphadenopathy  - continue photoprotection (such as SPF30+ sunscreen and proper use, UPF clothing, sun avoidance, tanning bed avoidance)  - monitor for ABCDE of melanoma; advised to bring any lesions of concern (new or changing over time) to medical attention     Procedures Performed:   - Shave biopsy: After discussion of benefits and risks including but not limited to bleeding, infection, scar, incomplete removal, recurrence, and non-diagnostic biopsy, written consent and photographs were obtained. The area was cleaned with isopropyl alcohol. < 1mL of 1% lidocaine with epinephrine was injected to obtain adequate anesthesia. A shave biopsy was performed. Hemostasis was achieved with aluminium chloride. Vaseline and a sterile dressing were applied. The patient tolerated the procedure and no complications were noted. The patient was provided with verbal and written post care instructions.     Follow-up: 07/29/2025 as scheduled in-person, or earlier for new or changing lesions    Staff and  scribe:    Scribe Disclosure:   I, Ayala Dyana, am serving as a scribe; to document services personally performed by Azul Rendon MD -based on data collection and the provider's statements to me.     Provider Disclosure:  I agree with above History, Review of Systems, Physical exam and Plan.  I have reviewed the content of the documentation and have edited it as needed. I have personally performed the services documented here and the documentation accurately represents those services and the decisions I have made.      Electronically signed by:  ***        ____________________________________________    CC: Derm Problem (3 month FBSC Hx of melanoma )    HPI:  Ms. Ree Batres is a 50 year old female who presents today as a return patient for FBSC.     Recent excision on the L paraspinal mid back went well.     Patient is otherwise feeling well, without additional concerns.    Labs:  Derm path 01/28/2025  Final Diagnosis   A. Left paraspinal upper back:  - Lentiginous junctional melanocytic nevus with mild atypia - (see description)      B. Left paraspinal mid back:  - Compound dysplastic nevus with moderate to severe atypia - (see comment and description)         Physical exam:  Vitals: There were no vitals taken for this visit.  GEN: This is a well developed, well-nourished female in no acute distress, in a pleasant mood.    SKIN: Total skin excluding the undergarment areas was performed. The exam included the head/face, neck, both arms, chest, back, abdomen, both legs, digits and/or nails.   - L volar forearm, 1 mm brown macule with a large globule  - R medial thigh slightly irregular dark brown macule   - L mid middle back, 4 mm brown macule   - There are dome shaped bright red papules on the trunk and extremities .   - Multiple regular brown pigmented macules and papules are identified on the trunk and extremities.   - Scattered brown macules on sun exposed areas.  - Waxy stuck on papules and plaques  on trunk and extremities.   - No other lesions of concern on areas examined.   LYMPH: No cervical, supraclavicular, axillary, or inguinal lymphadenopathy.    Medications:  Current Outpatient Medications   Medication Sig Dispense Refill    clobetasol (TEMOVATE) 0.05 % external solution Apply topically 2 times daily. 50 mL 1    EPINEPHrine (ANY BX GENERIC EQUIV) 0.3 MG/0.3ML injection 2-pack Inject 0.3 mLs (0.3 mg) into the muscle once as needed for anaphylaxis May repeat one time in 5-15 minutes if response to initial dose is inadequate. 2 each 0    gabapentin (NEURONTIN) 100 MG capsule Take 1 capsule (100 mg) by mouth at bedtime 90 capsule 3    ketoconazole (NIZORAL) 2 % external shampoo Apply topically daily as needed for itching or irritation 120 mL 10    metoclopramide (REGLAN) 10 MG tablet Take 1 tablet (10 mg) by mouth 4 times daily as needed for nausea. 12 tablet 0    triamcinolone (KENALOG) 0.1 % external cream Apply topically 2 times daily as instructed. 80 g 1     No current facility-administered medications for this visit.      Past Medical/Surgical History:   Patient Active Problem List   Diagnosis    History of basal cell cancer    ACP (advance care planning)    Elevated blood-pressure reading without diagnosis of hypertension     Past Medical History:   Diagnosis Date    Basal cell carcinoma of skin     H pylori ulcer     resolved    Kidney stone     Malignant melanoma

## 2025-04-29 ENCOUNTER — OFFICE VISIT (OUTPATIENT)
Dept: DERMATOLOGY | Facility: CLINIC | Age: 50
End: 2025-04-29
Payer: COMMERCIAL

## 2025-04-29 DIAGNOSIS — L82.1 SEBORRHEIC KERATOSES: ICD-10-CM

## 2025-04-29 DIAGNOSIS — Z86.018 HISTORY OF DYSPLASTIC NEVUS: ICD-10-CM

## 2025-04-29 DIAGNOSIS — D49.2 NEOPLASM OF UNSPECIFIED BEHAVIOR OF BONE, SOFT TISSUE, AND SKIN: ICD-10-CM

## 2025-04-29 DIAGNOSIS — Z12.83 SKIN CANCER SCREENING: Primary | ICD-10-CM

## 2025-04-29 DIAGNOSIS — D22.9 MULTIPLE BENIGN NEVI: ICD-10-CM

## 2025-04-29 DIAGNOSIS — Z85.820 HISTORY OF MELANOMA: ICD-10-CM

## 2025-04-29 DIAGNOSIS — D18.01 CHERRY ANGIOMA: ICD-10-CM

## 2025-04-29 DIAGNOSIS — L81.4 SOLAR LENTIGO: ICD-10-CM

## 2025-04-29 PROCEDURE — 88305 TISSUE EXAM BY PATHOLOGIST: CPT | Performed by: DERMATOLOGY

## 2025-04-29 NOTE — PATIENT INSTRUCTIONS
Checking for Skin Cancer  You can help find cancer early by checking your skin each month. There are 3 main kinds of skin cancer: melanoma, basal cell carcinoma, and squamous cell carcinoma. Doing monthly skin checks is the best way to find new marks, sores, or skin changes. Follow these instructions for checking your skin.   The ABCDEs of checking moles for melanoma   Check your moles or growths for signs of melanoma using ABCDE:   Asymmetry: The sides of the mole or growth don t match.  Border: The edges are ragged, notched, or blurred.  Color: The color within the mole or growth varies. It could be black, brown, tan, white, or shades of red, gray, or blue.  Diameter: The mole or growth is larger than   inch or 6 mm (size of a pencil eraser).  Evolving: The size, shape, texture, or color of the mole or growth is changing.     ABCDE's of moles on light skin.        ABCDE's of moles on dark skin may be harder to identify.     Checking for other types of skin cancer  Basal cell carcinoma or squamous cell carcinoma cause symptoms like:     A spot or mole that looks different from all other marks on your skin  Changes in how an area feels, such as itching, tenderness, or pain  Changes in the skin's surface, such as oozing, bleeding, or scaliness  A sore that doesn't heal  New swelling, redness, or spread of color beyond the border of a mole    Who s at risk?  Anyone of any skin color can get skin cancer. But you're at greater risk if you have:   Fair skin that freckles easily and burns instead of tanning  Light-colored or red hair  Light-colored eyes  Many moles or abnormal moles on your skin  A long history of unprotected exposure to sunlight or tanning beds  A history of many blistering sunburns as a child or teen  A family history of skin cancer  Been exposed to radiation or chemicals  A weakened immune system  Been exposed to arsenic  If you've had skin cancer in the past, you're at high risk of having it again.    How to check your skin  Do your monthly skin checkups in front of a full-length mirror. Use a room with good lighting so it's easier to see. Use a hand mirror to look at hard-to-see places like your buttocks and back. You can also have a trusted friend or family member help you with these checks. Check every part of your body, including your:   Head (ears, face, neck, and scalp)  Torso (front, back, sides, and under breasts)  Arms (tops, undersides, and armpits)  Hands (palms, backs, and fingers, including under the nails)  Lower back, buttocks, and genitals  Legs (front, back, and sides)  Feet (tops, soles, toes, including under the nails, and between toes)  Watch for new spots on your skin or a spot that's changing in color, shape, size.   If you have a lot of moles, take digital photos of them each month. Make sure to take photos both up close and from a distance. These can help you see if any moles change over time.   Know your skin  Most skin changes aren't cancer. But if you see any changes in your skin, call your healthcare provider right away. Only they can tell you if a change is a problem. If you have skin cancer, seeing your provider can be the first step to getting the treatment that could save your life.   Charles last reviewed this educational content on 10/1/2021    2051-8549 [employer]. All rights reserved. This information is not intended as a substitute for professional medical care. Always follow your healthcare professional's instructions.     Wound Care After a Biopsy    What is a skin biopsy?  A skin biopsy allows the doctor to examine a very small piece of tissue under the microscope to determine the diagnosis and the best treatment for the skin condition. A local anesthetic (numbing medicine) is injected with a very small needle into the skin area to be tested. A small piece of skin is taken from the area. Sometimes a suture (stitch) is used.     What are the risks of a skin  biopsy?  I will experience scar, bleeding, swelling, pain, crusting and redness. I may experience incomplete removal or recurrence. Risks of this procedure are excessive bleeding, bruising, infection, nerve damage, numbness, thick (hypertrophic or keloidal) scar and non-diagnostic biopsy.    How should I care for my wound for the first 24 hours?  Keep the wound dry and covered for 24 hours  If it bleeds, hold direct pressure on the area for 15 minutes. If bleeding does not stop, call us or go to the emergency room  Avoid strenuous exercise the first 1-2 days or as your doctor instructs you    How should I care for the wound after 24 hours?  After 24 hours, remove the bandage  You may bathe or shower as normal  If you had a scalp biopsy, you can shampoo as usual and can use shower water to clean the biopsy site daily  Clean the wound once a day with gentle soap and water  Do not scrub, be gentle  Apply white petroleum/Vaseline after cleaning the wound with a cotton swab or a clean finger, and keep the site covered with a Bandaid /bandage. Bandages are not necessary with a scalp biopsy  If you are unable to cover the site with a Bandaid /bandage, re-apply ointment 2-3 times a day to keep the site moist. Moisture will help with healing  Avoid strenuous activity for first 1-2 days  Avoid lakes, rivers, pools, and oceans until the stitches are removed or the site is healed    How do I clean my wound?  Wash hands thoroughly with soap or use hand  before all wound care  Clean the wound with gentle soap and water  Apply white petroleum/Vaseline  to wound after it is clean  Replace the Bandaid /bandage to keep the wound covered for the first few days or as instructed by your doctor  If you had a scalp biopsy, warm shower water to the area on a daily basis should suffice    What should I use to clean my wound?   Cotton-tipped applicators (Qtips )  White petroleum jelly (Vaseline ). Use a clean new container and use  Q-tips to apply.  Bandaids  as needed  Gentle soap     How should I care for my wound long term?  Do not get your wound dirty  Keep up with wound care for one week or until the area is healed.  A small scab will form and fall off by itself when the area is completely healed. The area will be red and will become pink in color as it heals. Sun protection is very important for how your scar will turn out. Sunscreen with an SPF 30 or greater is recommended once the area is healed.  You should have some soreness but it should be mild and slowly go away over several days. Talk to your doctor about using tylenol for pain,    When should I call my doctor?  If you have increased:   Pain or swelling  Pus or drainage (clear or slightly yellow drainage is ok)  Temperature over 100F  Spreading redness or warmth around wound    When will I hear about my results?  The biopsy results can take 2 weeks to come back.  Your results will automatically release to [employer] before your provider has even reviewed them.  The clinic will call you with the results, send you a [employer] message, or have you schedule a follow-up clinic or phone time to discuss the results.  Contact our clinics if you do not hear from us in 2 weeks.    Who should I call with questions?  [facility]: [phone]  [facility]: [phone]  For urgent needs outside of business hours call the [facility] at [phone] and ask for the dermatology resident on call

## 2025-06-14 ENCOUNTER — HEALTH MAINTENANCE LETTER (OUTPATIENT)
Age: 50
End: 2025-06-14

## 2025-06-29 DIAGNOSIS — L30.9 DERMATITIS: ICD-10-CM

## 2025-07-02 RX ORDER — TRIAMCINOLONE ACETONIDE 1 MG/G
CREAM TOPICAL
Qty: 80 G | Refills: 1 | Status: SHIPPED | OUTPATIENT
Start: 2025-07-02

## 2025-07-02 NOTE — TELEPHONE ENCOUNTER
Triamcinolone 0.1 cream  Last Written Prescription Date:  11/6/23  Last Fill Quantity: 80 g,  # refills: 1   Last office visit: 4/29/25 ; last virtual visit: Visit date not found with prescribing provider:  Julieta   Future Office Visit:  7/29/25    Prescription approved per [facility] Refill Protocol.    Crissy CHAVEZ RN  [facility] [place]  [phone]

## 2025-07-15 ENCOUNTER — ANCILLARY PROCEDURE (OUTPATIENT)
Dept: GENERAL RADIOLOGY | Facility: CLINIC | Age: 50
End: 2025-07-15
Attending: STUDENT IN AN ORGANIZED HEALTH CARE EDUCATION/TRAINING PROGRAM
Payer: COMMERCIAL

## 2025-07-15 ENCOUNTER — OFFICE VISIT (OUTPATIENT)
Dept: ANESTHESIOLOGY | Facility: CLINIC | Age: 50
End: 2025-07-15
Payer: COMMERCIAL

## 2025-07-15 VITALS
OXYGEN SATURATION: 100 % | DIASTOLIC BLOOD PRESSURE: 83 MMHG | HEART RATE: 79 BPM | SYSTOLIC BLOOD PRESSURE: 136 MMHG | RESPIRATION RATE: 16 BRPM

## 2025-07-15 DIAGNOSIS — M53.3 SACROILIAC JOINT DYSFUNCTION OF RIGHT SIDE: ICD-10-CM

## 2025-07-15 DIAGNOSIS — M79.18 MYOFASCIAL PAIN SYNDROME: ICD-10-CM

## 2025-07-15 DIAGNOSIS — M53.3 SACROILIAC JOINT DYSFUNCTION OF RIGHT SIDE: Primary | ICD-10-CM

## 2025-07-15 PROCEDURE — 72100 X-RAY EXAM L-S SPINE 2/3 VWS: CPT | Performed by: STUDENT IN AN ORGANIZED HEALTH CARE EDUCATION/TRAINING PROGRAM

## 2025-07-15 PROCEDURE — 72200 X-RAY EXAM SI JOINTS: CPT | Performed by: RADIOLOGY

## 2025-07-15 RX ORDER — METHOCARBAMOL 500 MG/1
500-1000 TABLET, FILM COATED ORAL 4 TIMES DAILY PRN
Qty: 90 TABLET | Refills: 1 | Status: SHIPPED | OUTPATIENT
Start: 2025-07-15

## 2025-07-15 ASSESSMENT — PAIN SCALES - GENERAL: PAINLEVEL_OUTOF10: MODERATE PAIN (6)

## 2025-07-15 NOTE — PATIENT INSTRUCTIONS
Medications:    methocarbamol (ROBAXIN) 500 MG tablet. Take 1-2 tablets (500-1,000 mg) by mouth 4 times daily as needed for muscle spasms.     *Please provide the clinic with a minium of 1 week notice, on all prescription refills.       Referrals:    Pain Physical Therapy Referral placed-   If you don't hear from a representative within 2 business days, please call [phone].        Imaging:    Xrays of Lumbar Spine and Sacroiliac Joint ordered    IMAGING SERVICES HOURS:    All imaging modalities are available from 7 a.m. - 9 p.m. Monday through Friday  X-ray, CT, MRI, and General Ultrasound appointments are available from 7 a.m. -3:30 p.m. on Saturdays  X-ray, CT and MRI appointments are available from 8 a.m. - 4:30 p.m. on Sundays  Please call [phone] to schedule imaging exams       Procedures:    Call to schedule your procedure: [phone] option #2    Right Sacroiliac Joint Injection    Your pre-procedure instructions are below, please call our clinic if you have any questions.      Recommended Follow up:      Follow up in 1-3 months.        Please call [phone] to schedule your clinic appointment if you don't already have an appointment scheduled.      Procedure Information:     Please call [phone] option #2 to schedule, reschedule, or cancel your procedure appointment. Leave a voicemail with your name, birth date, and phone number if no one is available to take your call.     The procedure center staff will call or send a MyChart 1-3 days before the procedure to review important information that you will need to know for the day of the procedure, including your arrival time.    Please contact the clinic if you have further questions about this information [phone].          Information related to Scheduling and Pre-Procedure Instructions:  If you must reschedule your procedure more than two times, you must follow up in clinic before rescheduling again.    Preparing for your  procedure    CAUTION - FAILURE TO FOLLOW THESE PRE-PROCEDURE INSTRUCTIONS WILL RESULT IN YOUR PROCEDURE BEING RESCHEDULED.    Your Procedure: Right SI Joint Injection      Pregnancy  If you are pregnant, or think you may be pregnant, please notify our staff. This may or may not affect the ability to perform the procedure.     You must have a  take you home after your procedure. Transportation by taxi or para-transit is okay as long as you have a responsible adult accompany you. You must provide your 's full name and contact number at time of check in.   Fasting Protocol You are allowed to eat and drink as usual prior to the procedure.     Medications If you take any medications, DO NOT STOP. Take your medications as usual the day of your procedure with a sip of water AT LEAST 2 HOURS PRIOR TO ARRIVAL.    Vaccines You must complete all vaccines more than 2 weeks prior to your scheduled procedure. No vaccines until 2 weeks after your procedure.   Antibiotics If you are currently taking antibiotics, you must complete the entire dose 7 days prior to your scheduled procedure. You must be clear of any signs or symptoms of infection. If you begin antibiotics, please contact our clinic for instructions.   Fever, Chills, Rash, or COVID If you experience a fever of higher than 100 degrees, chills, rash, open wounds, develop COVID symptoms or test positive during the one week before your procedure, please call the clinic to see if you may proceed with your procedure.        How should you care for yourself at home after your injection?  Get plenty of rest and avoid twisting, bending movements, heavy lifting, or strenuous activity for the first 24 hours. If receiving a steroid with your injection, this will help the steroid be more effective.   Resume your pain medications.  Apply ice packs (on for 20 minutes at a time), every 2-3 hours to your injection area for the first 2-3 days to help with pain control.     Avoid heat (pads or water bottles), you can use heat after 48 hours. Especially if receiving a steroid with your injection, heat can cause the veins to open up, making the steroid less effective.     For injections administering a steroid, please be aware that you can expect to feel your normal pain return after the anesthetic wears off in the first 1-2 days, until the steroid becomes effective. Steroid may take up to 3-14 days to be effective.     To speak with a nurse, schedule/reschedule/cancel a clinic appointment, or request a medication refill call: [phone]    You can also reach us by [employer]: [email or web address]

## 2025-07-15 NOTE — NURSING NOTE
"Patient presents with:  Pain Management  Consult  Back Pain: \"A lot of back pain.\"      Moderate Pain (6)     What medications are you using for pain? Gabapentin for nerve pain in legs, Tylenol    Have you been seen by another pain clinic/ provider? no    Expectations: pain management    Malika Zamudio, EMILIANO      "
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
## 2025-07-16 ENCOUNTER — TELEPHONE (OUTPATIENT)
Dept: ANESTHESIOLOGY | Facility: CLINIC | Age: 50
End: 2025-07-16
Payer: COMMERCIAL
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
## 2025-07-16 NOTE — TELEPHONE ENCOUNTER
Phoned the patient and left VM. Stated to call the pain clinic to schedule injection procedure at [phone].

## 2025-07-24 ENCOUNTER — TELEPHONE (OUTPATIENT)
Dept: ANESTHESIOLOGY | Facility: CLINIC | Age: 50
End: 2025-07-24
Payer: COMMERCIAL

## 2025-08-05 ENCOUNTER — TELEPHONE (OUTPATIENT)
Dept: ANESTHESIOLOGY | Facility: CLINIC | Age: 50
End: 2025-08-05
Payer: COMMERCIAL

## 2025-08-06 ENCOUNTER — THERAPY VISIT (OUTPATIENT)
Dept: PHYSICAL THERAPY | Facility: CLINIC | Age: 50
End: 2025-08-06
Attending: STUDENT IN AN ORGANIZED HEALTH CARE EDUCATION/TRAINING PROGRAM
Payer: COMMERCIAL

## 2025-08-06 DIAGNOSIS — M53.3 SACROILIAC JOINT DYSFUNCTION OF RIGHT SIDE: ICD-10-CM

## 2025-08-06 DIAGNOSIS — M79.18 MYOFASCIAL PAIN SYNDROME: ICD-10-CM

## 2025-08-06 PROCEDURE — 97110 THERAPEUTIC EXERCISES: CPT | Mod: GP | Performed by: PHYSICAL THERAPIST

## 2025-08-06 PROCEDURE — 97161 PT EVAL LOW COMPLEX 20 MIN: CPT | Mod: GP | Performed by: PHYSICAL THERAPIST

## 2025-08-06 PROCEDURE — 97530 THERAPEUTIC ACTIVITIES: CPT | Mod: GP | Performed by: PHYSICAL THERAPIST

## 2025-08-07 ENCOUNTER — TELEPHONE (OUTPATIENT)
Dept: ANESTHESIOLOGY | Facility: CLINIC | Age: 50
End: 2025-08-07
Payer: COMMERCIAL

## 2025-08-07 PROBLEM — M53.3 SACROILIAC JOINT DYSFUNCTION OF RIGHT SIDE: Status: ACTIVE | Noted: 2025-07-15

## 2025-08-13 ENCOUNTER — THERAPY VISIT (OUTPATIENT)
Dept: PHYSICAL THERAPY | Facility: CLINIC | Age: 50
End: 2025-08-13
Attending: STUDENT IN AN ORGANIZED HEALTH CARE EDUCATION/TRAINING PROGRAM
Payer: COMMERCIAL

## 2025-08-13 DIAGNOSIS — M79.18 MYOFASCIAL PAIN SYNDROME: ICD-10-CM

## 2025-08-13 DIAGNOSIS — M53.3 SACROILIAC JOINT DYSFUNCTION OF RIGHT SIDE: Primary | ICD-10-CM

## 2025-08-13 PROCEDURE — 97110 THERAPEUTIC EXERCISES: CPT | Mod: GP | Performed by: PHYSICAL THERAPIST

## 2025-08-22 ENCOUNTER — MYC REFILL (OUTPATIENT)
Dept: DERMATOLOGY | Facility: CLINIC | Age: 50
End: 2025-08-22
Payer: COMMERCIAL

## 2025-08-22 DIAGNOSIS — R20.8 ALLODYNIA: ICD-10-CM

## 2025-08-22 DIAGNOSIS — L30.9 DERMATITIS: ICD-10-CM

## 2025-08-25 RX ORDER — TRIAMCINOLONE ACETONIDE 1 MG/G
CREAM TOPICAL 2 TIMES DAILY
Qty: 80 G | Refills: 0 | Status: SHIPPED | OUTPATIENT
Start: 2025-08-25

## 2025-08-26 ENCOUNTER — THERAPY VISIT (OUTPATIENT)
Dept: PHYSICAL THERAPY | Facility: CLINIC | Age: 50
End: 2025-08-26
Payer: COMMERCIAL

## 2025-08-26 DIAGNOSIS — M79.18 MYOFASCIAL PAIN SYNDROME: ICD-10-CM

## 2025-08-26 DIAGNOSIS — M53.3 SACROILIAC JOINT DYSFUNCTION OF RIGHT SIDE: Primary | ICD-10-CM

## 2025-08-26 PROCEDURE — 97110 THERAPEUTIC EXERCISES: CPT | Mod: GP | Performed by: PHYSICAL THERAPIST

## 2025-08-26 RX ORDER — GABAPENTIN 100 MG/1
100 CAPSULE ORAL AT BEDTIME
Qty: 90 CAPSULE | Refills: 3 | Status: SHIPPED | OUTPATIENT
Start: 2025-08-26

## 2025-08-29 ENCOUNTER — HOSPITAL ENCOUNTER (OUTPATIENT)
Facility: AMBULATORY SURGERY CENTER | Age: 50
Discharge: HOME | End: 2025-08-29
Attending: STUDENT IN AN ORGANIZED HEALTH CARE EDUCATION/TRAINING PROGRAM | Admitting: STUDENT IN AN ORGANIZED HEALTH CARE EDUCATION/TRAINING PROGRAM
Payer: COMMERCIAL

## 2025-08-29 ENCOUNTER — ANCILLARY PROCEDURE (OUTPATIENT)
Dept: RADIOLOGY | Facility: AMBULATORY SURGERY CENTER | Age: 50
End: 2025-08-29
Attending: STUDENT IN AN ORGANIZED HEALTH CARE EDUCATION/TRAINING PROGRAM
Payer: COMMERCIAL

## 2025-08-29 VITALS
BODY MASS INDEX: 20.92 KG/M2 | HEART RATE: 76 BPM | SYSTOLIC BLOOD PRESSURE: 167 MMHG | WEIGHT: 138 LBS | TEMPERATURE: 97.7 F | OXYGEN SATURATION: 100 % | DIASTOLIC BLOOD PRESSURE: 112 MMHG | RESPIRATION RATE: 16 BRPM | HEIGHT: 68 IN

## 2025-08-29 DIAGNOSIS — M53.3 SACROILIAC JOINT DYSFUNCTION OF RIGHT SIDE: ICD-10-CM

## 2025-08-29 PROCEDURE — 27096 INJECT SACROILIAC JOINT: CPT | Mod: RT | Performed by: STUDENT IN AN ORGANIZED HEALTH CARE EDUCATION/TRAINING PROGRAM

## 2025-08-29 PROCEDURE — G0260 INJ FOR SACROILIAC JT ANESTH: HCPCS | Mod: RT

## 2025-08-29 RX ORDER — LIDOCAINE HYDROCHLORIDE 10 MG/ML
INJECTION, SOLUTION EPIDURAL; INFILTRATION; INTRACAUDAL; PERINEURAL PRN
Status: DISCONTINUED | OUTPATIENT
Start: 2025-08-29 | End: 2025-08-29 | Stop reason: HOSPADM

## 2025-08-29 RX ORDER — METHYLPREDNISOLONE ACETATE 40 MG/ML
INJECTION, SUSPENSION INTRA-ARTICULAR; INTRALESIONAL; INTRAMUSCULAR; SOFT TISSUE PRN
Status: DISCONTINUED | OUTPATIENT
Start: 2025-08-29 | End: 2025-08-29 | Stop reason: HOSPADM

## 2025-08-29 RX ORDER — BUPIVACAINE HYDROCHLORIDE 2.5 MG/ML
INJECTION, SOLUTION EPIDURAL; INFILTRATION; INTRACAUDAL; PERINEURAL PRN
Status: DISCONTINUED | OUTPATIENT
Start: 2025-08-29 | End: 2025-08-29 | Stop reason: HOSPADM

## (undated) DEVICE — TRAY PAIN INJECTION 97A 640

## (undated) DEVICE — PREP CHLORAPREP W/ORANGE TINT 10.5ML 260715

## (undated) DEVICE — NDL SPINAL 22GA 3.5" QUINCKE 405181

## (undated) DEVICE — GLOVE PROTEXIS POWDER FREE ORANGE 7.5  2D72PT75X